# Patient Record
Sex: FEMALE | Race: WHITE | HISPANIC OR LATINO | Employment: PART TIME | URBAN - METROPOLITAN AREA
[De-identification: names, ages, dates, MRNs, and addresses within clinical notes are randomized per-mention and may not be internally consistent; named-entity substitution may affect disease eponyms.]

---

## 2017-10-05 ENCOUNTER — HOSPITAL ENCOUNTER (OUTPATIENT)
Dept: LAB | Facility: MEDICAL CENTER | Age: 27
End: 2017-10-05
Attending: NURSE PRACTITIONER
Payer: COMMERCIAL

## 2017-10-05 ENCOUNTER — INITIAL PRENATAL (OUTPATIENT)
Dept: OBGYN | Facility: CLINIC | Age: 27
End: 2017-10-05

## 2017-10-05 ENCOUNTER — HOSPITAL ENCOUNTER (OUTPATIENT)
Facility: MEDICAL CENTER | Age: 27
End: 2017-10-05
Attending: NURSE PRACTITIONER
Payer: COMMERCIAL

## 2017-10-05 VITALS
WEIGHT: 193 LBS | DIASTOLIC BLOOD PRESSURE: 100 MMHG | BODY MASS INDEX: 37.89 KG/M2 | HEIGHT: 60 IN | SYSTOLIC BLOOD PRESSURE: 160 MMHG

## 2017-10-05 DIAGNOSIS — Z34.80 SUPERVISION OF OTHER NORMAL PREGNANCY, ANTEPARTUM: ICD-10-CM

## 2017-10-05 DIAGNOSIS — O09.90 SUPERVISION OF HIGH RISK PREGNANCY, ANTEPARTUM: ICD-10-CM

## 2017-10-05 PROBLEM — Z34.00 PREGNANCY, SUPERVISION OF FIRST: Status: ACTIVE | Noted: 2017-10-05

## 2017-10-05 LAB
ABO GROUP BLD: NORMAL
ALBUMIN SERPL BCP-MCNC: 3.2 G/DL (ref 3.2–4.9)
ALBUMIN/GLOB SERPL: 0.9 G/DL
ALP SERPL-CCNC: 115 U/L (ref 30–99)
ALT SERPL-CCNC: 28 U/L (ref 2–50)
ANION GAP SERPL CALC-SCNC: 11 MMOL/L (ref 0–11.9)
APPEARANCE UR: ABNORMAL
AST SERPL-CCNC: 41 U/L (ref 12–45)
BACTERIA #/AREA URNS HPF: ABNORMAL /HPF
BASOPHILS # BLD AUTO: 0.6 % (ref 0–1.8)
BASOPHILS # BLD: 0.08 K/UL (ref 0–0.12)
BILIRUB SERPL-MCNC: 0.3 MG/DL (ref 0.1–1.5)
BILIRUB UR QL STRIP.AUTO: NEGATIVE
BLD GP AB SCN SERPL QL: NORMAL
BUN SERPL-MCNC: 13 MG/DL (ref 8–22)
CALCIUM SERPL-MCNC: 9.1 MG/DL (ref 8.5–10.5)
CHLORIDE SERPL-SCNC: 102 MMOL/L (ref 96–112)
CO2 SERPL-SCNC: 22 MMOL/L (ref 20–33)
COLOR UR: YELLOW
CREAT SERPL-MCNC: 0.78 MG/DL (ref 0.5–1.4)
CULTURE IF INDICATED INDCX: YES UA CULTURE
EOSINOPHIL # BLD AUTO: 0.12 K/UL (ref 0–0.51)
EOSINOPHIL NFR BLD: 0.9 % (ref 0–6.9)
EPI CELLS #/AREA URNS HPF: ABNORMAL /HPF
ERYTHROCYTE [DISTWIDTH] IN BLOOD BY AUTOMATED COUNT: 43.7 FL (ref 35.9–50)
GFR SERPL CREATININE-BSD FRML MDRD: >60 ML/MIN/1.73 M 2
GLOBULIN SER CALC-MCNC: 3.4 G/DL (ref 1.9–3.5)
GLUCOSE 1H P 50 G GLC PO SERPL-MCNC: 323 MG/DL (ref 70–139)
GLUCOSE SERPL-MCNC: 219 MG/DL (ref 65–99)
GLUCOSE UR STRIP.AUTO-MCNC: >=1000 MG/DL
HBV SURFACE AG SER QL: NEGATIVE
HCT VFR BLD AUTO: 45.4 % (ref 37–47)
HGB BLD-MCNC: 15.1 G/DL (ref 12–16)
HIV 1+2 AB+HIV1 P24 AG SERPL QL IA: NON REACTIVE
HYALINE CASTS #/AREA URNS LPF: ABNORMAL /LPF
IMM GRANULOCYTES # BLD AUTO: 0.05 K/UL (ref 0–0.11)
IMM GRANULOCYTES NFR BLD AUTO: 0.4 % (ref 0–0.9)
KETONES UR STRIP.AUTO-MCNC: NEGATIVE MG/DL
LEUKOCYTE ESTERASE UR QL STRIP.AUTO: NEGATIVE
LYMPHOCYTES # BLD AUTO: 3.52 K/UL (ref 1–4.8)
LYMPHOCYTES NFR BLD: 25.3 % (ref 22–41)
MCH RBC QN AUTO: 29.3 PG (ref 27–33)
MCHC RBC AUTO-ENTMCNC: 33.3 G/DL (ref 33.6–35)
MCV RBC AUTO: 88 FL (ref 81.4–97.8)
MICRO URNS: ABNORMAL
MONOCYTES # BLD AUTO: 0.9 K/UL (ref 0–0.85)
MONOCYTES NFR BLD AUTO: 6.5 % (ref 0–13.4)
NEUTROPHILS # BLD AUTO: 9.22 K/UL (ref 2–7.15)
NEUTROPHILS NFR BLD: 66.3 % (ref 44–72)
NITRITE UR QL STRIP.AUTO: NEGATIVE
NRBC # BLD AUTO: 0 K/UL
NRBC BLD AUTO-RTO: 0 /100 WBC
PH UR STRIP.AUTO: 6.5 [PH]
PLATELET # BLD AUTO: 318 K/UL (ref 164–446)
PMV BLD AUTO: 11.2 FL (ref 9–12.9)
POTASSIUM SERPL-SCNC: 4.4 MMOL/L (ref 3.6–5.5)
PROT SERPL-MCNC: 6.6 G/DL (ref 6–8.2)
PROT UR QL STRIP: NEGATIVE MG/DL
RBC # BLD AUTO: 5.16 M/UL (ref 4.2–5.4)
RBC # URNS HPF: ABNORMAL /HPF
RBC UR QL AUTO: NEGATIVE
RH BLD: NORMAL
RUBV AB SER QL: 24.3 IU/ML
SODIUM SERPL-SCNC: 135 MMOL/L (ref 135–145)
SP GR UR STRIP.AUTO: 1.04
TREPONEMA PALLIDUM IGG+IGM AB [PRESENCE] IN SERUM OR PLASMA BY IMMUNOASSAY: NON REACTIVE
URATE SERPL-MCNC: 4.8 MG/DL (ref 1.9–8.2)
UROBILINOGEN UR STRIP.AUTO-MCNC: 0.2 MG/DL
WBC # BLD AUTO: 13.9 K/UL (ref 4.8–10.8)
WBC #/AREA URNS HPF: ABNORMAL /HPF

## 2017-10-05 PROCEDURE — 81002 URINALYSIS NONAUTO W/O SCOPE: CPT | Performed by: NURSE PRACTITIONER

## 2017-10-05 PROCEDURE — 90040 PR PRENATAL FOLLOW UP: CPT | Performed by: NURSE PRACTITIONER

## 2017-10-05 RX ORDER — LABETALOL 200 MG/1
200 TABLET, FILM COATED ORAL 2 TIMES DAILY
Qty: 28 TAB | Refills: 0 | Status: SHIPPED | OUTPATIENT
Start: 2017-10-05 | End: 2019-01-29

## 2017-10-05 RX ORDER — METHYLDOPA 250 MG/1
250 TABLET, FILM COATED ORAL
Qty: 28 TAB | Refills: 0 | Status: SHIPPED | OUTPATIENT
Start: 2017-10-05 | End: 2017-10-13

## 2017-10-05 ASSESSMENT — ENCOUNTER SYMPTOMS
CARDIOVASCULAR NEGATIVE: 1
CONSTITUTIONAL NEGATIVE: 1
EYES NEGATIVE: 1
RESPIRATORY NEGATIVE: 1
MUSCULOSKELETAL NEGATIVE: 1
NEUROLOGICAL NEGATIVE: 1
PSYCHIATRIC NEGATIVE: 1
GASTROINTESTINAL NEGATIVE: 1

## 2017-10-05 NOTE — PATIENT INSTRUCTIONS
Plan:   - GC/CT done today, too far along for pap  - GCT ordered  - PIH labs and 24 hr urine ordered   - Prenatal labs ordered - lab slip given  - Discussed PNV, nutrition, adequate water intake, and exercise/weight gain in pregnancy  - NOB informational packet with anticipatory guidance given  - Information on Centering Pregnancy given, pt declines  - S/sx of pregnancy warning signs and PTL precautions given  - Complete OB US next available   - Return to [facility] next week for MD visit   -Consulted with Dr. Noble and prefers to start pt on Aldoment 250 mg BID and Labetalol 200mg BID today

## 2017-10-05 NOTE — PROGRESS NOTES
Subjective:      Angelina Gonzalez is a 27 y.o. female who presents with No chief complaint on file.            Subjective:   Angelina Gonzalez is a 27 y.o.  who presents for her new OB exam.  She is 24w0d with an JATINDER of Estimated Date of Delivery: 18 by LMP. She is feeling well and has no concerns at this time. Denies VB, LOF, contractions or pain. No ER visits or previous care in this pregnancy. Denies dysuria, vaginal DC, fever. Reports fetal movement. Too late for AFP.  Declines CF.  Denies any headache, SOB, changes in vision, swelling in face/abdomen.     No past medical history on file.    No past surgical history on file.     OB History  First pregnancy              Gynecological Hx: Denies any hx of STIs, including HSV. Denies any vulvovaginal disorders and no hx of abnormal cervical cytology. Last pap  normal    Sexual Hx: One current male partner, who is FOB     Contraceptive Hx: Has used pills in the past and has since discontinued use.     Family History  Hypertension: Mother     Denies any genetic disorders in family history.     Social History    Social History  • Marital status:     Spouse name: N/A  • Number of children: N/A  • Years of education: N/A    Occupational History  • Not on file.    Social History Main Topics  • Smoking status: Never Smoker  • Smokeless tobacco: Never Used  • Alcohol use No  • Drug use: No  • Sexual activity: Yes    Partners: Male     Comment: none    Other Topics Concern  • Not on file    Social History Narrative  • No narrative on file      FOB is involved and lives with Angelina Gonzalez.  Pregnancy is unplanned but desired.    She is currently working at [employer] , denies any heavy lifting or exposure to potential teratogens like environmental or occupational toxins.   Denies alcohol use, drug use, or tobacco use in pregnancy.   Denies any current or hx of sexual, emotional or physical abuse or trauma.     Current Medications: PNV  Allergies: Denies allergies to  medications, food, or environmental allergies    Objective:      Vitals:   10/05/17 0903  BP: 140/98  Repeat 160/92  And 160/100  Weight: 87.5 kg (193 lb)  Height: 1.524 m (5')       See Prenatal Physical and Prenatal Vitals  UA 10 protein with hemolyzed blood and moderate ketones along with 1000 glucose, no nitrates       Assessment:      1.  IUP @ 24w0d per unsure LMP       2.  S>D       3.  See problem list as follows      Plan:   - GC/CT done today, too far along for pap  - GCT ordered  - PIH labs and 24 hr urine ordered   - Prenatal labs ordered - lab slip given  - Discussed PNV, nutrition, adequate water intake, and exercise/weight gain in pregnancy  - NOB informational packet with anticipatory guidance given  - Information on Centering Pregnancy given, pt declines  - S/sx of pregnancy warning signs and PTL precautions given  - Complete OB US next available   - Return to [facility] next week for MD visit   -Consulted with Dr. Noble and prefers to start pt on Aldoment 250 mg BID and Labetalol 200mg BID today            Review of Systems   Constitutional: Negative.    HENT: Negative.    Eyes: Negative.    Respiratory: Negative.    Cardiovascular: Negative.    Gastrointestinal: Negative.    Genitourinary: Negative.    Musculoskeletal: Negative.    Skin: Negative.    Neurological: Negative.    Endo/Heme/Allergies: Negative.    Psychiatric/Behavioral: Negative.           Objective:     /92   Ht 1.524 m (5')   Wt 87.5 kg (193 lb)   LMP 04/20/2017 (Approximate)   BMI 37.69 kg/m²      Physical Exam   Constitutional: She is oriented to person, place, and time. She appears well-developed and well-nourished.   HENT:   Head: Normocephalic and atraumatic.   Eyes: Pupils are equal, round, and reactive to light.   Neck: Normal range of motion. Neck supple.   Cardiovascular: Normal rate and regular rhythm.    Pulmonary/Chest: Effort normal and breath sounds normal.   Abdominal: Soft. Bowel sounds are normal.    Genitourinary: Vagina normal and uterus normal.   Musculoskeletal: Normal range of motion.   Neurological: She is alert and oriented to person, place, and time. She has normal reflexes.   Skin: Skin is warm and dry.   Psychiatric: She has a normal mood and affect. Her behavior is normal. Judgment and thought content normal.               Assessment/Plan:     1. Supervision of other normal pregnancy, antepartum  - POCT Urinalysis

## 2017-10-05 NOTE — PROGRESS NOTES
Pt. Here for NOB visit today.  # [phone]  First prenatal care  Pt. States no complaints   Pharmacy verified  1 hr gtt and pnp given today.

## 2017-10-05 NOTE — LETTER
October 5, 2017      Angelina Gonzalez is currently pregnant and being cared for by [facility]. She is not to lift anything more than 20 pounds because this could jeopardize her pregnancy. She also should be able to take breaks for snacks and water every 2-3 hours with frequent bathroom breaks. Thank you for understanding.        Thank you,          ZULY Rogers    Electronically Signed

## 2017-10-05 NOTE — PROGRESS NOTES
Subjective:   Angelina Gonzalez is a 27 y.o.  who presents for her new OB exam.  She is 24w0d with an JATINDER of Estimated Date of Delivery: 18 by LMP. She is feeling well and has no concerns at this time. Denies VB, LOF, contractions or pain. No ER visits or previous care in this pregnancy. Denies dysuria, vaginal DC, fever. Reports fetal movement. Desires AFP.  Declines CF.      No past medical history on file.    No past surgical history on file.     OB History    Para Term  AB Living   1             SAB TAB Ectopic Molar Multiple Live Births                    # Outcome Date GA Lbr Augustus/2nd Weight Sex Delivery Anes PTL Lv   1 Current                    Gynecological Hx: Denies any hx of STIs, including HSV. Denies any vulvovaginal disorders and no hx of abnormal cervical cytology. Last pap  normal    Sexual Hx: One current male partner, who is FOB     Contraceptive Hx: Has used pills in the past and has since discontinued use.     Family History   Problem Relation Age of Onset   • Hypertension Mother      Denies any genetic disorders in family history.     Social History     Social History   • Marital status:      Spouse name: N/A   • Number of children: N/A   • Years of education: N/A     Occupational History   • Not on file.     Social History Main Topics   • Smoking status: Never Smoker   • Smokeless tobacco: Never Used   • Alcohol use No   • Drug use: No   • Sexual activity: Yes     Partners: Male      Comment: none     Other Topics Concern   • Not on file     Social History Narrative   • No narrative on file       FOB is involved and lives with Angelina Gonzalez.  Pregnancy is unplanned but desired.    She is currently working at "[employer] , denies any heavy lifting or exposure to potential teratogens like environmental or occupational toxins.   Denies alcohol use, drug use, or tobacco use in pregnancy.   Denies any current or hx of sexual, emotional or physical abuse or trauma.     Current  Medications: PNV  Allergies: Denies allergies to medications, food, or environmental allergies    Objective:      Vitals:    10/05/17 0903   BP: 140/98   Weight: 87.5 kg (193 lb)   Height: 1.524 m (5')        See Prenatal Physical and Prenatal Vitals  UA WNL today      Assessment:      1.  IUP @ 24w0d per unsure LMP       2.  S=D      3.  See problem list as follows     There are no active problems to display for this patient.        Plan:   -  GC/CT done today   -   - Prenatal labs ordered - lab slip given  - Discussed PNV, nutrition, adequate water intake, and exercise/weight gain in pregnancy  - NOB informational packet with anticipatory guidance given  - Information on Centering Pregnancy given, pt declines  - S/sx of pregnancy warning signs and PTL precautions given  - Complete OB US in next available  wks  - Return to [facility] in 2 wks  -  ***

## 2017-10-06 LAB
C TRACH DNA SPEC QL NAA+PROBE: NEGATIVE
C TRACH DNA SPEC QL NAA+PROBE: NEGATIVE
N GONORRHOEA DNA SPEC QL NAA+PROBE: NEGATIVE
N GONORRHOEA DNA SPEC QL NAA+PROBE: NEGATIVE
SPECIMEN SOURCE: NORMAL
SPECIMEN SOURCE: NORMAL

## 2017-10-08 LAB
BACTERIA UR CULT: NORMAL
SIGNIFICANT IND 70042: NORMAL
SOURCE SOURCE: NORMAL

## 2017-10-09 ENCOUNTER — HOSPITAL ENCOUNTER (OUTPATIENT)
Facility: MEDICAL CENTER | Age: 27
End: 2017-10-09
Attending: NURSE PRACTITIONER
Payer: COMMERCIAL

## 2017-10-09 ENCOUNTER — TELEPHONE (OUTPATIENT)
Dept: OBGYN | Facility: CLINIC | Age: 27
End: 2017-10-09

## 2017-10-09 NOTE — TELEPHONE ENCOUNTER
----- Message from Paula Allison C.N.M. sent at 10/6/2017  7:55 AM PDT -----  Pt has GDM > 200 on glucose in her CMP.  Her 1hr isn't here but it is 323, so she needs GDM teaching and to f/u at GDM clinic.  Please schedule this asap.      10/9/17 1436 Left message for pt to call back regarding lab results.   10/10/17 1053 pt called back and Pt notified of Dx of GDM and needs to go to GDM class and f/u at [facility] with MD. Pt asking if she can be seen on Thursday after her US on 10/12/17. Consulted with Shruthi EISENBERG who agreed to see pt on Thursday 10/12/17. Pt scheduled for GDM f/u at [facility] on 10/19/17 at 0900.  Pt aware to bring in her log book and meter and will need to provide UA sample. Pt Verbalized understanding.

## 2017-10-10 DIAGNOSIS — O09.90 SUPERVISION OF HIGH RISK PREGNANCY, ANTEPARTUM: ICD-10-CM

## 2017-10-10 DIAGNOSIS — Z34.80 SUPERVISION OF OTHER NORMAL PREGNANCY, ANTEPARTUM: ICD-10-CM

## 2017-10-10 LAB
PROT 24H UR-MCNC: 330 MG/24 HR (ref 30–150)
PROT 24H UR-MRATE: 12 MG/DL (ref 0–15)
SPECIMEN VOL UR: 2750 ML

## 2017-10-12 ENCOUNTER — NON-PROVIDER VISIT (OUTPATIENT)
Dept: OBGYN | Facility: CLINIC | Age: 27
End: 2017-10-12

## 2017-10-12 ENCOUNTER — ROUTINE PRENATAL (OUTPATIENT)
Dept: OBGYN | Facility: CLINIC | Age: 27
End: 2017-10-12

## 2017-10-12 ENCOUNTER — APPOINTMENT (OUTPATIENT)
Dept: RADIOLOGY | Facility: IMAGING CENTER | Age: 27
End: 2017-10-12
Attending: NURSE PRACTITIONER

## 2017-10-12 ENCOUNTER — HOSPITAL ENCOUNTER (OUTPATIENT)
Facility: MEDICAL CENTER | Age: 27
End: 2017-10-12
Attending: OBSTETRICS & GYNECOLOGY | Admitting: OBSTETRICS & GYNECOLOGY

## 2017-10-12 VITALS — DIASTOLIC BLOOD PRESSURE: 114 MMHG | BODY MASS INDEX: 37.3 KG/M2 | WEIGHT: 191 LBS | SYSTOLIC BLOOD PRESSURE: 190 MMHG

## 2017-10-12 VITALS — HEIGHT: 61 IN | BODY MASS INDEX: 36.17 KG/M2 | WEIGHT: 191.6 LBS

## 2017-10-12 VITALS
BODY MASS INDEX: 36.06 KG/M2 | HEIGHT: 61 IN | WEIGHT: 191 LBS | DIASTOLIC BLOOD PRESSURE: 88 MMHG | SYSTOLIC BLOOD PRESSURE: 145 MMHG | HEART RATE: 89 BPM

## 2017-10-12 DIAGNOSIS — O09.90 SUPERVISION OF HIGH RISK PREGNANCY, ANTEPARTUM: ICD-10-CM

## 2017-10-12 DIAGNOSIS — Z34.80 SUPERVISION OF OTHER NORMAL PREGNANCY, ANTEPARTUM: ICD-10-CM

## 2017-10-12 LAB
ALBUMIN SERPL BCP-MCNC: 3 G/DL (ref 3.2–4.9)
ALBUMIN/GLOB SERPL: 0.9 G/DL
ALP SERPL-CCNC: 107 U/L (ref 30–99)
ALT SERPL-CCNC: 28 U/L (ref 2–50)
ANION GAP SERPL CALC-SCNC: 10 MMOL/L (ref 0–11.9)
APPEARANCE UR: CLEAR
AST SERPL-CCNC: 56 U/L (ref 12–45)
BASOPHILS # BLD AUTO: 0.6 % (ref 0–1.8)
BASOPHILS # BLD: 0.07 K/UL (ref 0–0.12)
BILIRUB SERPL-MCNC: 0.4 MG/DL (ref 0.1–1.5)
BUN SERPL-MCNC: 15 MG/DL (ref 8–22)
CALCIUM SERPL-MCNC: 9 MG/DL (ref 8.5–10.5)
CHLORIDE SERPL-SCNC: 105 MMOL/L (ref 96–112)
CO2 SERPL-SCNC: 18 MMOL/L (ref 20–33)
COLOR UR AUTO: YELLOW
CREAT SERPL-MCNC: 0.79 MG/DL (ref 0.5–1.4)
EOSINOPHIL # BLD AUTO: 0.1 K/UL (ref 0–0.51)
EOSINOPHIL NFR BLD: 0.8 % (ref 0–6.9)
ERYTHROCYTE [DISTWIDTH] IN BLOOD BY AUTOMATED COUNT: 44.3 FL (ref 35.9–50)
EST. AVERAGE GLUCOSE BLD GHB EST-MCNC: 235 MG/DL
GFR SERPL CREATININE-BSD FRML MDRD: >60 ML/MIN/1.73 M 2
GLOBULIN SER CALC-MCNC: 3.3 G/DL (ref 1.9–3.5)
GLUCOSE BLD-MCNC: 111 MG/DL (ref 65–99)
GLUCOSE BLD-MCNC: 111 MG/DL (ref 65–99)
GLUCOSE SERPL-MCNC: 139 MG/DL (ref 65–99)
GLUCOSE UR QL STRIP.AUTO: NEGATIVE MG/DL
HBA1C MFR BLD: 9.8 % (ref 0–5.6)
HCT VFR BLD AUTO: 43 % (ref 37–47)
HGB BLD-MCNC: 14.3 G/DL (ref 12–16)
IMM GRANULOCYTES # BLD AUTO: 0.05 K/UL (ref 0–0.11)
IMM GRANULOCYTES NFR BLD AUTO: 0.4 % (ref 0–0.9)
KETONES UR QL STRIP.AUTO: NEGATIVE MG/DL
LEUKOCYTE ESTERASE UR QL STRIP.AUTO: ABNORMAL
LYMPHOCYTES # BLD AUTO: 3.06 K/UL (ref 1–4.8)
LYMPHOCYTES NFR BLD: 24.6 % (ref 22–41)
MCH RBC QN AUTO: 28.9 PG (ref 27–33)
MCHC RBC AUTO-ENTMCNC: 33.3 G/DL (ref 33.6–35)
MCV RBC AUTO: 87 FL (ref 81.4–97.8)
MONOCYTES # BLD AUTO: 0.81 K/UL (ref 0–0.85)
MONOCYTES NFR BLD AUTO: 6.5 % (ref 0–13.4)
NEUTROPHILS # BLD AUTO: 8.36 K/UL (ref 2–7.15)
NEUTROPHILS NFR BLD: 67.1 % (ref 44–72)
NITRITE UR QL STRIP.AUTO: NEGATIVE
NRBC # BLD AUTO: 0 K/UL
NRBC BLD AUTO-RTO: 0 /100 WBC
PH UR STRIP.AUTO: 6 [PH]
PLATELET # BLD AUTO: 300 K/UL (ref 164–446)
PMV BLD AUTO: 10.6 FL (ref 9–12.9)
POTASSIUM SERPL-SCNC: 4.4 MMOL/L (ref 3.6–5.5)
PROT SERPL-MCNC: 6.3 G/DL (ref 6–8.2)
PROT UR QL STRIP: NEGATIVE MG/DL
RBC # BLD AUTO: 4.94 M/UL (ref 4.2–5.4)
RBC UR QL AUTO: NEGATIVE
SODIUM SERPL-SCNC: 133 MMOL/L (ref 135–145)
SP GR UR: <=1.005
URATE SERPL-MCNC: 6 MG/DL (ref 1.9–8.2)
WBC # BLD AUTO: 12.5 K/UL (ref 4.8–10.8)

## 2017-10-12 PROCEDURE — 84550 ASSAY OF BLOOD/URIC ACID: CPT

## 2017-10-12 PROCEDURE — 700111 HCHG RX REV CODE 636 W/ 250 OVERRIDE (IP): Performed by: NURSE PRACTITIONER

## 2017-10-12 PROCEDURE — 83036 HEMOGLOBIN GLYCOSYLATED A1C: CPT

## 2017-10-12 PROCEDURE — 36415 COLL VENOUS BLD VENIPUNCTURE: CPT

## 2017-10-12 PROCEDURE — 700102 HCHG RX REV CODE 250 W/ 637 OVERRIDE(OP): Performed by: OBSTETRICS & GYNECOLOGY

## 2017-10-12 PROCEDURE — 96374 THER/PROPH/DIAG INJ IV PUSH: CPT

## 2017-10-12 PROCEDURE — 82962 GLUCOSE BLOOD TEST: CPT | Mod: 91

## 2017-10-12 PROCEDURE — 76805 OB US >/= 14 WKS SNGL FETUS: CPT | Mod: 26 | Performed by: OBSTETRICS & GYNECOLOGY

## 2017-10-12 PROCEDURE — 90040 PR PRENATAL FOLLOW UP: CPT | Performed by: OBSTETRICS & GYNECOLOGY

## 2017-10-12 PROCEDURE — 81002 URINALYSIS NONAUTO W/O SCOPE: CPT

## 2017-10-12 PROCEDURE — 59025 FETAL NON-STRESS TEST: CPT | Performed by: OBSTETRICS & GYNECOLOGY

## 2017-10-12 PROCEDURE — 80053 COMPREHEN METABOLIC PANEL: CPT

## 2017-10-12 PROCEDURE — 85025 COMPLETE CBC W/AUTO DIFF WBC: CPT

## 2017-10-12 PROCEDURE — A9270 NON-COVERED ITEM OR SERVICE: HCPCS | Performed by: OBSTETRICS & GYNECOLOGY

## 2017-10-12 RX ORDER — LABETALOL 300 MG/1
300 TABLET, FILM COATED ORAL ONCE
Status: COMPLETED | OUTPATIENT
Start: 2017-10-12 | End: 2017-10-12

## 2017-10-12 RX ORDER — HYDRALAZINE HYDROCHLORIDE 20 MG/ML
5 INJECTION INTRAMUSCULAR; INTRAVENOUS ONCE
Status: COMPLETED | OUTPATIENT
Start: 2017-10-12 | End: 2017-10-12

## 2017-10-12 RX ORDER — NIFEDIPINE 30 MG/1
30 TABLET, EXTENDED RELEASE ORAL ONCE
Status: COMPLETED | OUTPATIENT
Start: 2017-10-12 | End: 2017-10-12

## 2017-10-12 RX ADMIN — LABETALOL HCL 300 MG: 300 TABLET, FILM COATED ORAL at 14:02

## 2017-10-12 RX ADMIN — HYDRALAZINE HYDROCHLORIDE 5 MG: 20 INJECTION INTRAMUSCULAR; INTRAVENOUS at 13:17

## 2017-10-12 RX ADMIN — NIFEDIPINE 30 MG: 30 TABLET, FILM COATED, EXTENDED RELEASE ORAL at 11:34

## 2017-10-12 ASSESSMENT — PAIN SCALES - GENERAL: PAINLEVEL_OUTOF10: 0

## 2017-10-12 NOTE — PROGRESS NOTES
Blakemichael Brooke is a 27 y.o.  at 25w0d here today for obstetrical visit.  Patient is without complaints.    She reports good fetal movement.  She denies vaginal bleeding.  She denies rupture of membranes.  She denies contractions.     has Supervision of high-risk pregnancy on her problem list.    Patient was seen here today solely for blood pressure check  Blood pressure is 190/114  Patient denies headaches blurry vision nausea vomiting  Otherwise doing well    A/P IUP at 25w0d  Patient is sent to labor and delivery for blood pressure evaluation  Patient is also sent for labs    F/U in 1 weeks

## 2017-10-12 NOTE — PROGRESS NOTES
Labor and Delivery Triage check    PATIENT ID:  NAME:  Angelina Cox  MRN:               [id number]  YOB: 1990     27 y.o. female  at 29w0d by US done today, pt was unsure of LMP due to irregular periods. She was sent from clinic due to severe pressures today. She reports not having taken her blood pressure medication this morning but she has been taking it the past week regularly Aldomet 250mg BID and Labetalol 200mg BID.     Subjective:  Pt denies any headache, SOB, changes in vision, swelling in face/abdomen, right upper quadrant pain or any other issues at this time. Reports good fetal movement, no vaginal bleeding, no leaking fluid, no contractions/cramping pain.     Objective:    Vitals:    10/12/17 1200 10/12/17 1214 10/12/17 1229 10/12/17 1244   BP: (!) 171/103 (!) 166/101 (!) 172/107 (!) 173/105   Pulse: 80 82 85 77   Weight:       Height:         Serial BPs 117/112; 173/105; post medication 135/76, 124/75    Cervix:  not assessed as pt not in labor  [place]: Mild irritability noted, not felt by patient, fundus soft on palpation  FHRM: Baseline 145, moderate variability no decels  medications: Procardia 30XL now followed by 5mg hydralazine IV due to continued elevated pressures   Pain: none    Assessment: 27 y.o. female    at 29w0d by US today.    Plan:   1. PIH labs: AST 56; all other values within normal range   2. Continued maternal and fetal monitoring  3. Pt to be discharged home once stable with BPs   4.   Pt to follow up at [facility] tomorrow for medication consult for chronic hyperglycemia   5.   Discharged home on Procardia 30XL daily and Labetalol 300mg BID, to discontinue aldomet at this time.   6.   Warning s/sx of preeclampsia, PTL discussed

## 2017-10-12 NOTE — PROGRESS NOTES
Pt here today for OB follow up  Pt states no complaints   Reports +  Good # [phone]  Pharmacy Confirmed.

## 2017-10-12 NOTE — PROGRESS NOTES
, EDC  = 25.0 weeks,  Hx new DX chronic HTN last week taking BP's meds at home, new DX diet control GDM, late PNC.     1100- Patient presents to L&D bed 3 for PIH work up and Blood pressure monitoring. Denies any contractions, leaking of fluid or any vaginal bleeding. EFM and TOCO applied. States positive fetal movement. VSS. Serial BP's started. Patient denies any Headaches, visions changes or epigastric pain. Patient states she just found out she was pregnant 3-4 weeks ago. States she was just diagnosed with GDM this week and chronic HTN last week. Patient states she is taking blood pressure medicine, but forgot to take it this AM.     1125- [place] AP[facility] at bedside. Updated on patient blood pressures and status. In to see patient. Will monitor after procardia Procardia XL given. Updated on patient uterine activity. Patient denies feeling any contractions.     1240- patient's blood pressures remain 160-170/105's. [place] [facility] updated. Will consult with Dr Gabriel    1300- order received from Dr Gabriel to give 5 mg IV hydralazine. IV started.     1315- 5 mg Hydralizine given by IV. Will continue to monitor. Fingerstick 111.    1340- Dr Gabriel called RN for an update. Update given on blood pressures, fingerstick and uterine activity. Order received to give patient labetalol 300 mg now. Will continue to monitor blood pressures     1415- [place] [facility] updated on patient while in department. Will continue to monitor.     1455- [place] [facility] into see patient discussed plan of care. Order received to discharge home and to have f/u with [facility] tomorrow.     1510- patient had 3 variables. [facility] [place] APN notified. Strip reviewed. Will continue to monitor.     1530- [place] [facility] at bedside. Patient having late decelerations. Patient given IV fluid. Positioned changed. Dr Gabriel called and notified. Informed to continue with fluid and to have patient eat.     1640- [facility] [place] [facility] at bedside. Strip reviewed. Call placed to Dr Gabriel.   Gabriel reviewed tracing in call room. Order received to discharge home.     1650- Dr Gabriel called regarding fetal tracing. Dr Gabriel will be by to see patient.     1700- Dr Gabriel at bedside. Assisted with fetal monitoring. Discussed with patient the importance of getting GDM and blood pressures under control. Strip reviewed. EFM and TOCO removed per Dr Gabriel order. Order received to continue with discharge.     1720-  Patient given  labor, preeclampsia and to continue with kick counts. Patient states understanding of when to return to L&D. Next appt is tomorrow at 0900 at [facility]. patient instructed on importance of keeping appt tomorrow. Patient given prescriptions for Procardia XL and Labetalol 300 mg.  Patient discharged home with FOB in stable condition.

## 2017-10-12 NOTE — LETTER
October 12, 2017                   Re: Angelina Cox    1990         [id number]       [facility]  [address] ([address])  [place] [address]      Dear :[facility], *    On 10/12/2017, your patient Angelina Cox, received 1.5 hours of gestational diabetes training from the [facility] Center at [facility] for management of her gestational diabetes.  She likely has undiagnosed type 2 diabetes as her 1 hour OGTT result was 323 and she had 1000 glucose in her urine.  Her EDC is Estimated Date of Delivery: 1/25/18.  We taught the following subjects:    Introduction to gestational diabetes, benefits and responsibilities of patient, physiology of diabetes and the diease process, benefits of blood glucose monitoring and record keeping, medication action and possible side effects, hypoglycemia, sick day management, exercise, stress reduction and travel with diabetes.       Nurse assessment / Education:    Comments:    BP:    Edema:no      Weight:Weight: 86.9 kg (191 lb 9.6 oz)         Complaints:no      Pathophysiology of diabetes in pregnancy    Discuss  potential maternal and fetal complications in pregnancy with diabetes.     Importance of blood glucose monitoring   Proper testing technique using a One Touch Verio IQ meter.    At 9:30, the meter read 192, which was 2.5 after eating.  Testing: fasting and one hour after meals,  expected ranges and rationale for strict control.   Urine ketone testing and rationale    Ketone testing:  First morning void and one other   time of day, alternating times before meals.    Ketone test today:yes - See flowsheet       Recognition and treatment of hypoglycemia.     Insulin taught: No  Insulin briefly dicussed at this time.    Should patient require insulin later in pregnancy, she would need further education.   Insulin taught: Yes    Discuss benefits and risks of exercise in pregnancy  Discuss when to call Doctor    Patient provided [id number]  calorie meal plan with 3 meals and 3 snacks.   172 grams carbohydrate,   99 grams protein,   65 grams fat  Importance of meal planning in diabetes management during pregnancy  Importance of consistent timing of meals and snacks and agreed upon times  Avoidance of simple carbohydrates  Metabolism of food components relating to pregnancy  Identification of foods in food groups  Patient demonstrates adequate ability to utilize meal planning manual for reference  Plan 3 meals and 3 snacks with 90% accuracy  Review basic principles of eating out  Reviewed precautions with artificial sweeteners  Comments:  Angelina agreed to follow the meal plan and to eat at the times agreed upon.  She will check blood glucose 4 times a day and record the values in her log book.  She will follow up at [facility].    We also discussed keeping the salt lower in her meal plan because she has elevated blood pressure and is on medication for it.    Patient/caregiver appeared to understand the content as demonstrated by appropriate questions.     Angelina Brooke was encouraged to discuss this further with you.    Hopefully this will help in your management of her care.  If we can be of further assistance, please feel free to call.    Thank you for the referral.    Sincerely,  Shruthi Baez RD, CDE  Certified Diabetes Educator

## 2017-10-13 ENCOUNTER — ROUTINE PRENATAL (OUTPATIENT)
Dept: OBGYN | Facility: CLINIC | Age: 27
End: 2017-10-13

## 2017-10-13 VITALS — DIASTOLIC BLOOD PRESSURE: 100 MMHG | BODY MASS INDEX: 36.66 KG/M2 | WEIGHT: 194 LBS | SYSTOLIC BLOOD PRESSURE: 130 MMHG

## 2017-10-13 DIAGNOSIS — O24.414 INSULIN CONTROLLED GESTATIONAL DIABETES MELLITUS (GDM) IN THIRD TRIMESTER: ICD-10-CM

## 2017-10-13 DIAGNOSIS — O09.90 SUPERVISION OF HIGH RISK PREGNANCY, ANTEPARTUM: ICD-10-CM

## 2017-10-13 DIAGNOSIS — I10 CHRONIC HYPERTENSION: ICD-10-CM

## 2017-10-13 LAB
APPEARANCE UR: NORMAL
BILIRUB UR STRIP-MCNC: NORMAL MG/DL
COLOR UR AUTO: NORMAL
GLUCOSE UR STRIP.AUTO-MCNC: NORMAL MG/DL
KETONES UR STRIP.AUTO-MCNC: NORMAL MG/DL
LEUKOCYTE ESTERASE UR QL STRIP.AUTO: NORMAL
NITRITE UR QL STRIP.AUTO: NORMAL
PH UR STRIP.AUTO: 6 [PH] (ref 5–8)
PROT UR QL STRIP: NORMAL MG/DL
RBC UR QL AUTO: NORMAL
SP GR UR STRIP.AUTO: 1.02
UROBILINOGEN UR STRIP-MCNC: NORMAL MG/DL

## 2017-10-13 PROCEDURE — 81002 URINALYSIS NONAUTO W/O SCOPE: CPT | Performed by: OBSTETRICS & GYNECOLOGY

## 2017-10-13 PROCEDURE — 90040 PR PRENATAL FOLLOW UP: CPT | Performed by: OBSTETRICS & GYNECOLOGY

## 2017-10-13 NOTE — PROGRESS NOTES
Self injection and mixing Insulin instructions given to pt on 10/13/17. Pt will start on 10 units of NPH, with 5 units of regular insulin before breakfast, 5 units of regular insulin before dinner and 10 units of NPH QHS. Pt instructed on how to draw up insulin and how to mix insulin, site selection and rotation, self injection technique, proper storage of disposal of syringes. Pt demonstrated back self injection technique successfully. Advised to follow her meal plan very closely. Instruction booklets given. Will call back in case of any questions.    One vial of NPH given Lot# [id number] Exp: 12/2019 and one vial of Regular insulin given Lot# [id number], Exp: 1/2019.

## 2017-10-13 NOTE — LETTER
"Count Your Baby's Movements  Another step to a healthy delivery    A Epic Dress Re Test             Dept: [phone]    How Many Weeks Pregnant? 29w1d    Date to Begin Counting: 10/13/17              How to use this chart    One way for your physician to keep track of your baby's health is by knowing how often the baby moves (or \"kicks\") in your womb.  You can help your physician to do this by using this chart every day.    Every day, you should see how many hours it takes for your baby to move 10 times.  Start in the morning, as soon as you get up.    · First, write down the time your baby moves until you get to 10.  · Check off one box every time your baby moves until you get to 10.  · Write down the time you finished counting in the last column.  · Total how long it took to count up all 10 movements.  · Finally, fill in the box that shows how long this took.  After counting 10 movements, you no longer have to count any more that day.  The next morning, just start counting again as soon as you get up.    What should you call a \"movement\"?  It is hard to say, because it will feel different from one mother to another and from one pregnancy to the next.  The important thing is that you count the movements the same way throughout your pregnancy.  If you have more questions, you should ask your physician.    Count carefully every day!  SAMPLE:  Week 28    How many hours did it take to feel 10 movements?       Start  Time     1     2     3     4     5     6     7     8     9     10   Finish Time   Mon 8:20 ·  ·  ·  ·  ·  ·  ·  ·  ·  ·  11:40   Tue Wed Thu Fri               Sat               Sun                 IMPORTANT: You should contact your physician if it takes more than two hours for you to feel 10 movements.  Each morning, write down the time and start to count the movements of your baby.  Keep track by checking off one box every time you feel one movement.  When you " "have felt 10 \"kicks\", write down the time you finished counting in the last column.  Then fill in the   box (over the check shana) for the number of hours it took.  Be sure to read the complete instructions on the previous page.            "

## 2017-10-13 NOTE — PROGRESS NOTES
Review of labs and US as well as NST     Pt has EDC changed to 12/28/18 with EGA of 29 0/7 weeks  She also has REGINALD of 19 cm making fetal monitoring very difficult  Initially thought patient to be 25 weeks when sent over from [facility] for BP monitoring and control    Pt BP has been controlled on Procardia PO, hydralazine x 1 SIVP and labetalol PO. She has been monitored for several hours and BP have remained stable.    Pt has not eaten since being here and last ate breakfast around 8 am therefore fetal tracing has been decreased. Currently fetal monitoring improved with good BTBV and accels by 10 beats x 2 in 20 min period. Can hear good fetal movement but baby goes off monitor with movement secondary to increased REGINALD.    Discussed with patient need to control her DM and HTN both which are probably chronic and the importance to both her and the well being of the fetus.    PTL precautions and preeclampsia precautions given.    Pt has appt tomorrow at 0900 for start of insulin.    HbA1C is 9.8

## 2017-10-13 NOTE — PROGRESS NOTES
Angelina Brooke 27 y.o.  29w1d here today for obstetrical visit. Patient reports good  fetal movement. denies contractions, denies vaginal bleeding, denies loss of fluid.    Pregnancy is complicated by   Patient Active Problem List    Diagnosis Date Noted   • Supervision of high-risk pregnancy 10/05/2017   Pt sent to L&D yesterday for elevated BPS.  She is unsure what she is taking, but it thinks it is Labetalol 300 mg po bid and Procardia 30 mg.  No headache, visual changes, RUQ pain, or edema.       Fasting blood sugars range - unknown  Postprandial blood sugars range 168-192  Hemoglobin A1c 9.8  Will start insulin   Insulin regimen  NPH a.m. 10 , regular a.m.5  Regular with dinner 5  NPH at bedtime 10    Referral placed to [facility] for fetal survey and echo.     Followup in 1 week   labor precautions are reviewed  Continue kick counts daily after 28 weeks  NST twice weekly after 32 weeks if on insulin

## 2017-10-13 NOTE — PROGRESS NOTES
Pt here for OB/FU GDM Reports Good Fetal Movement.  Pt states no complications today.   Pt will get flu and TDAP vaccine at next visit  Kick count sheet was given and explained to pt.  Pt declines BTL   # [phone]

## 2017-10-13 NOTE — PROGRESS NOTES
Referral faxed to [facility] on 10/13/17.  They will contact patient to schedule appt.  Please check with patient if appt was made/ document. Thank you

## 2017-10-13 NOTE — LETTER
October 13, 2017    Patient: Angelina Cox   YOB: 1990   Date of Visit: 10/13/2017       To Whom It May Concern:    Angelina Cox was seen and treated at [facility] please excuse her from work this morning. If you have any question please call us.     Sincerely,             Dr. Jeanne Bhatti  [facility]  Dept: [phone]

## 2017-10-16 ENCOUNTER — TELEPHONE (OUTPATIENT)
Dept: OBGYN | Facility: CLINIC | Age: 27
End: 2017-10-16

## 2017-10-16 DIAGNOSIS — O09.90 SUPERVISION OF HIGH RISK PREGNANCY, ANTEPARTUM: Primary | ICD-10-CM

## 2017-10-16 DIAGNOSIS — O24.913 DIABETES MELLITUS AFFECTING PREGNANCY IN THIRD TRIMESTER: ICD-10-CM

## 2017-10-16 NOTE — TELEPHONE ENCOUNTER
Pt called triage line stating she needs an order for insulin needles called pt to clarify what she needed unable to contact pt left message to call back.

## 2017-10-19 ENCOUNTER — ROUTINE PRENATAL (OUTPATIENT)
Dept: OBGYN | Facility: CLINIC | Age: 27
End: 2017-10-19

## 2017-10-19 VITALS — SYSTOLIC BLOOD PRESSURE: 140 MMHG | WEIGHT: 191 LBS | BODY MASS INDEX: 36.09 KG/M2 | DIASTOLIC BLOOD PRESSURE: 86 MMHG

## 2017-10-19 DIAGNOSIS — O09.30 LATE PRENATAL CARE: ICD-10-CM

## 2017-10-19 DIAGNOSIS — I10 ESSENTIAL HYPERTENSION: ICD-10-CM

## 2017-10-19 DIAGNOSIS — O09.90 SUPERVISION OF HIGH RISK PREGNANCY, ANTEPARTUM: ICD-10-CM

## 2017-10-19 PROCEDURE — 90040 PR PRENATAL FOLLOW UP: CPT | Performed by: OBSTETRICS & GYNECOLOGY

## 2017-10-19 NOTE — PROGRESS NOTES
Pt denies CTX, LOF, VB or any other c/o.  Good fetal movement.    Lab:  Recent Results (from the past 672 hour(s))   CHLAMYDIA/GC PCR URINE OR SWAB    Collection Time: 10/05/17 10:15 AM   Result Value Ref Range    Source GENITAL     C. trachomatis by PCR Negative Negative    N. gonorrhoeae by PCR Negative Negative   PREG CNTR PRENATAL PN    Collection Time: 10/05/17  1:05 PM   Result Value Ref Range    Color Yellow     Character Cloudy (A)     Specific Gravity 1.038 <1.035    Ph 6.5 5.0 - 8.0    Glucose >=1000 (A) Negative mg/dL    Ketones Negative Negative mg/dL    Protein Negative Negative mg/dL    Bilirubin Negative Negative    Urobilinogen, Urine 0.2 Negative    Nitrite Negative Negative    Leukocyte Esterase Negative Negative    Occult Blood Negative Negative    Micro Urine Req Microscopic     Rubella IgG Antibody 24.30 IU/mL    Syphilis, Treponemal Qual Non Reactive Non Reactive    Hepatitis B Surface Antigen Negative Negative    WBC 13.9 (H) 4.8 - 10.8 K/uL    RBC 5.16 4.20 - 5.40 M/uL    Hemoglobin 15.1 12.0 - 16.0 g/dL    Hematocrit 45.4 37.0 - 47.0 %    MCV 88.0 81.4 - 97.8 fL    MCH 29.3 27.0 - 33.0 pg    MCHC 33.3 (L) 33.6 - 35.0 g/dL    RDW 43.7 35.9 - 50.0 fL    Platelet Count 318 164 - 446 K/uL    MPV 11.2 9.0 - 12.9 fL    Neutrophils-Polys 66.30 44.00 - 72.00 %    Lymphocytes 25.30 22.00 - 41.00 %    Monocytes 6.50 0.00 - 13.40 %    Eosinophils 0.90 0.00 - 6.90 %    Basophils 0.60 0.00 - 1.80 %    Immature Granulocytes 0.40 0.00 - 0.90 %    Nucleated RBC 0.00 /100 WBC    Neutrophils (Absolute) 9.22 (H) 2.00 - 7.15 K/uL    Lymphs (Absolute) 3.52 1.00 - 4.80 K/uL    Monos (Absolute) 0.90 (H) 0.00 - 0.85 K/uL    Eos (Absolute) 0.12 0.00 - 0.51 K/uL    Baso (Absolute) 0.08 0.00 - 0.12 K/uL    Immature Granulocytes (abs) 0.05 0.00 - 0.11 K/uL    NRBC (Absolute) 0.00 K/uL    Culture Indicated Yes UA Culture   COMP METABOLIC PANEL    Collection Time: 10/05/17  1:05 PM   Result Value Ref Range    Sodium 135  135 - 145 mmol/L    Potassium 4.4 3.6 - 5.5 mmol/L    Chloride 102 96 - 112 mmol/L    Co2 22 20 - 33 mmol/L    Anion Gap 11.0 0.0 - 11.9    Glucose 219 (H) 65 - 99 mg/dL    Bun 13 8 - 22 mg/dL    Creatinine 0.78 0.50 - 1.40 mg/dL    Calcium 9.1 8.5 - 10.5 mg/dL    AST(SGOT) 41 12 - 45 U/L    ALT(SGPT) 28 2 - 50 U/L    Alkaline Phosphatase 115 (H) 30 - 99 U/L    Total Bilirubin 0.3 0.1 - 1.5 mg/dL    Albumin 3.2 3.2 - 4.9 g/dL    Total Protein 6.6 6.0 - 8.2 g/dL    Globulin 3.4 1.9 - 3.5 g/dL    A-G Ratio 0.9 g/dL   URIC ACID    Collection Time: 10/05/17  1:05 PM   Result Value Ref Range    Uric Acid 4.8 1.9 - 8.2 mg/dL   HIV ANTIBODIES    Collection Time: 10/05/17  1:05 PM   Result Value Ref Range    HIV Ag/Ab Combo Assay Non Reactive Non Reactive   CHLAMYDIA/GC PCR URINE OR SWAB    Collection Time: 10/05/17  1:05 PM   Result Value Ref Range    Source Urine     C. trachomatis by PCR Negative Negative    N. gonorrhoeae by PCR Negative Negative   OP PRENATAL PANEL-BLOOD BANK    Collection Time: 10/05/17  1:05 PM   Result Value Ref Range    ABO Grouping Only O     Rh Grouping Only POS     Antibody Screen Scrn NEG    URINE MICROSCOPIC (W/UA)    Collection Time: 10/05/17  1:05 PM   Result Value Ref Range    WBC 5-10 (A) /hpf    RBC 2-5 (A) /hpf    Bacteria Moderate (A) None /hpf    Epithelial Cells Moderate (A) /hpf    Hyaline Cast 0-2 /lpf   ESTIMATED GFR    Collection Time: 10/05/17  1:05 PM   Result Value Ref Range    GFR If African American >60 >60 mL/min/1.73 m 2    GFR If Non African American >60 >60 mL/min/1.73 m 2   URINE CULTURE(NEW)    Collection Time: 10/05/17  1:05 PM   Result Value Ref Range    Significant Indicator NEG     Source UR     Urine Culture Mixed skin ap >100,000 cfu/mL    GLUCOSE 1HR GESTATIONAL    Collection Time: 10/05/17  2:10 PM   Result Value Ref Range    Glucose, Post Dose 323 (H) 70 - 139 mg/dL   URINETOTAL PROTEIN 24 HR    Collection Time: 10/09/17  7:00 AM   Result Value Ref Range     Total Protein, Urine 12.0 0.0 - 15.0 mg/dL    Total Volume, Urine 2,750 mL    Total Protein, 24 Hour Urine 330.0 (H) 30.0 - 150.0 mg/24 Hr   CBC WITH DIFFERENTIAL    Collection Time: 10/12/17 11:30 AM   Result Value Ref Range    WBC 12.5 (H) 4.8 - 10.8 K/uL    RBC 4.94 4.20 - 5.40 M/uL    Hemoglobin 14.3 12.0 - 16.0 g/dL    Hematocrit 43.0 37.0 - 47.0 %    MCV 87.0 81.4 - 97.8 fL    MCH 28.9 27.0 - 33.0 pg    MCHC 33.3 (L) 33.6 - 35.0 g/dL    RDW 44.3 35.9 - 50.0 fL    Platelet Count 300 164 - 446 K/uL    MPV 10.6 9.0 - 12.9 fL    Neutrophils-Polys 67.10 44.00 - 72.00 %    Lymphocytes 24.60 22.00 - 41.00 %    Monocytes 6.50 0.00 - 13.40 %    Eosinophils 0.80 0.00 - 6.90 %    Basophils 0.60 0.00 - 1.80 %    Immature Granulocytes 0.40 0.00 - 0.90 %    Nucleated RBC 0.00 /100 WBC    Neutrophils (Absolute) 8.36 (H) 2.00 - 7.15 K/uL    Lymphs (Absolute) 3.06 1.00 - 4.80 K/uL    Monos (Absolute) 0.81 0.00 - 0.85 K/uL    Eos (Absolute) 0.10 0.00 - 0.51 K/uL    Baso (Absolute) 0.07 0.00 - 0.12 K/uL    Immature Granulocytes (abs) 0.05 0.00 - 0.11 K/uL    NRBC (Absolute) 0.00 K/uL   COMP METABOLIC PANEL    Collection Time: 10/12/17 11:30 AM   Result Value Ref Range    Sodium 133 (L) 135 - 145 mmol/L    Potassium 4.4 3.6 - 5.5 mmol/L    Chloride 105 96 - 112 mmol/L    Co2 18 (L) 20 - 33 mmol/L    Anion Gap 10.0 0.0 - 11.9    Glucose 139 (H) 65 - 99 mg/dL    Bun 15 8 - 22 mg/dL    Creatinine 0.79 0.50 - 1.40 mg/dL    Calcium 9.0 8.5 - 10.5 mg/dL    AST(SGOT) 56 (H) 12 - 45 U/L    ALT(SGPT) 28 2 - 50 U/L    Alkaline Phosphatase 107 (H) 30 - 99 U/L    Total Bilirubin 0.4 0.1 - 1.5 mg/dL    Albumin 3.0 (L) 3.2 - 4.9 g/dL    Total Protein 6.3 6.0 - 8.2 g/dL    Globulin 3.3 1.9 - 3.5 g/dL    A-G Ratio 0.9 g/dL   URIC ACID    Collection Time: 10/12/17 11:30 AM   Result Value Ref Range    Uric Acid 6.0 1.9 - 8.2 mg/dL   HEMOGLOBIN A1C    Collection Time: 10/12/17 11:30 AM   Result Value Ref Range    Glycohemoglobin 9.8 (H) 0.0 - 5.6 %     Est Avg Glucose 235 mg/dL   ESTIMATED GFR    Collection Time: 10/12/17 11:30 AM   Result Value Ref Range    GFR If African American >60 >60 mL/min/1.73 m 2    GFR If Non African American >60 >60 mL/min/1.73 m 2   POC UA    Collection Time: 10/12/17 12:33 PM   Result Value Ref Range    POC Color Yellow     POC Appearance Clear     POC Glucose Negative Negative mg/dL    POC Ketones Negative Negative mg/dL    POC Specific Gravity <=1.005 (A) 1.005 - 1.030    POC Blood Negative Negative    POC Urine PH 6.0 5.0 - 8.0    POC Protein Negative Negative mg/dL    POC Nitrites Negative Negative    POC Leukocyte Esterase Small (A) Negative   ACCU-CHEK GLUCOSE    Collection Time: 10/12/17  1:11 PM   Result Value Ref Range    Glucose - Accu-Ck 111 (H) 65 - 99 mg/dL   ACCU-CHEK GLUCOSE    Collection Time: 10/12/17  3:44 PM   Result Value Ref Range    Glucose - Accu-Ck 111 (H) 65 - 99 mg/dL   POCT Urinalysis    Collection Time: 10/13/17  9:16 AM   Result Value Ref Range    POC Color  Negative    POC Appearance  Negative    POC Leukocyte Esterase neg Negative    POC Nitrites neg Negative    POC Urobiligen  Negative (0.2) mg/dL    POC Protein trace Negative mg/dL    POC Urine PH 6.0 5.0 - 8.0    POC Blood neg Negative    POC Specific Gravity 1.020 <1.005 - >1.030    POC Ketones trace Negative mg/dL    POC Biliruben  Negative mg/dL    POC Glucose neg Negative mg/dL       A/P:  27 y.o.  at 30w0d presents for obstetric follow-up. A2GDM & HTN - most likely both are chronic with very elevated BP and HbA1C - all FSBS for last 2 days where WNL. Random was normal so will not change insulin regimen.    1.  Continue prenatal vitamins.  2.  Begin/continue kick counts at 28 weeks   3.  Watch weight gain.  4.  Increase water intake.  5.  Follow-up in 1 weeks.  6.  PTL/labor precautions given  7.  Continue Nifedipine and labetalol

## 2017-10-19 NOTE — PROGRESS NOTES
Pt here today for OB follow up  Pt states no states complaints   Reports +  [facility] # [phone]  Pharmacy Confirmed.

## 2017-10-19 NOTE — PROGRESS NOTES
Pt provided [facility] information.  They stated they have Medicaid starting November 1, but there is no information in the chart about which kind.    Her blood sugars dropped lower the last three days.  She states she's trying to be extra careful with her diet.

## 2017-10-26 ENCOUNTER — ROUTINE PRENATAL (OUTPATIENT)
Dept: OBGYN | Facility: CLINIC | Age: 27
End: 2017-10-26

## 2017-10-26 ENCOUNTER — HOSPITAL ENCOUNTER (OUTPATIENT)
Facility: MEDICAL CENTER | Age: 27
End: 2017-10-26
Attending: OBSTETRICS & GYNECOLOGY | Admitting: OBSTETRICS & GYNECOLOGY

## 2017-10-26 VITALS — BODY MASS INDEX: 36.09 KG/M2 | DIASTOLIC BLOOD PRESSURE: 103 MMHG | SYSTOLIC BLOOD PRESSURE: 158 MMHG | WEIGHT: 191 LBS

## 2017-10-26 VITALS
HEART RATE: 70 BPM | TEMPERATURE: 98.6 F | SYSTOLIC BLOOD PRESSURE: 138 MMHG | WEIGHT: 191 LBS | DIASTOLIC BLOOD PRESSURE: 87 MMHG | BODY MASS INDEX: 36.06 KG/M2 | HEIGHT: 61 IN

## 2017-10-26 DIAGNOSIS — I10 ESSENTIAL HYPERTENSION: ICD-10-CM

## 2017-10-26 DIAGNOSIS — O09.90 SUPERVISION OF HIGH RISK PREGNANCY, ANTEPARTUM: ICD-10-CM

## 2017-10-26 LAB
ALBUMIN SERPL BCP-MCNC: 3.2 G/DL (ref 3.2–4.9)
ALBUMIN/GLOB SERPL: 1 G/DL
ALP SERPL-CCNC: 130 U/L (ref 30–99)
ALT SERPL-CCNC: 48 U/L (ref 2–50)
ANION GAP SERPL CALC-SCNC: 9 MMOL/L (ref 0–11.9)
APPEARANCE UR: ABNORMAL
APPEARANCE UR: ABNORMAL
APPEARANCE UR: NORMAL
AST SERPL-CCNC: 58 U/L (ref 12–45)
BASOPHILS # BLD AUTO: 0.7 % (ref 0–1.8)
BASOPHILS # BLD: 0.07 K/UL (ref 0–0.12)
BILIRUB SERPL-MCNC: 0.4 MG/DL (ref 0.1–1.5)
BILIRUB UR STRIP-MCNC: NORMAL MG/DL
BUN SERPL-MCNC: 18 MG/DL (ref 8–22)
CALCIUM SERPL-MCNC: 9.1 MG/DL (ref 8.5–10.5)
CHLORIDE SERPL-SCNC: 104 MMOL/L (ref 96–112)
CO2 SERPL-SCNC: 22 MMOL/L (ref 20–33)
COLOR UR AUTO: NORMAL
COLOR UR AUTO: YELLOW
COLOR UR AUTO: YELLOW
CREAT SERPL-MCNC: 0.89 MG/DL (ref 0.5–1.4)
EOSINOPHIL # BLD AUTO: 0.09 K/UL (ref 0–0.51)
EOSINOPHIL NFR BLD: 0.9 % (ref 0–6.9)
ERYTHROCYTE [DISTWIDTH] IN BLOOD BY AUTOMATED COUNT: 45 FL (ref 35.9–50)
GFR SERPL CREATININE-BSD FRML MDRD: >60 ML/MIN/1.73 M 2
GLOBULIN SER CALC-MCNC: 3.1 G/DL (ref 1.9–3.5)
GLUCOSE SERPL-MCNC: 64 MG/DL (ref 65–99)
GLUCOSE UR QL STRIP.AUTO: NEGATIVE MG/DL
GLUCOSE UR QL STRIP.AUTO: NEGATIVE MG/DL
GLUCOSE UR STRIP.AUTO-MCNC: NORMAL MG/DL
HCT VFR BLD AUTO: 43.8 % (ref 37–47)
HGB BLD-MCNC: 14.6 G/DL (ref 12–16)
IMM GRANULOCYTES # BLD AUTO: 0.04 K/UL (ref 0–0.11)
IMM GRANULOCYTES NFR BLD AUTO: 0.4 % (ref 0–0.9)
KETONES UR QL STRIP.AUTO: NEGATIVE MG/DL
KETONES UR QL STRIP.AUTO: NEGATIVE MG/DL
KETONES UR STRIP.AUTO-MCNC: NORMAL MG/DL
LEUKOCYTE ESTERASE UR QL STRIP.AUTO: ABNORMAL
LEUKOCYTE ESTERASE UR QL STRIP.AUTO: ABNORMAL
LEUKOCYTE ESTERASE UR QL STRIP.AUTO: NORMAL
LYMPHOCYTES # BLD AUTO: 2.87 K/UL (ref 1–4.8)
LYMPHOCYTES NFR BLD: 29 % (ref 22–41)
MCH RBC QN AUTO: 29.4 PG (ref 27–33)
MCHC RBC AUTO-ENTMCNC: 33.3 G/DL (ref 33.6–35)
MCV RBC AUTO: 88.3 FL (ref 81.4–97.8)
MONOCYTES # BLD AUTO: 0.58 K/UL (ref 0–0.85)
MONOCYTES NFR BLD AUTO: 5.9 % (ref 0–13.4)
NEUTROPHILS # BLD AUTO: 6.25 K/UL (ref 2–7.15)
NEUTROPHILS NFR BLD: 63.1 % (ref 44–72)
NITRITE UR QL STRIP.AUTO: NEGATIVE
NITRITE UR QL STRIP.AUTO: NEGATIVE
NITRITE UR QL STRIP.AUTO: NORMAL
NRBC # BLD AUTO: 0 K/UL
NRBC BLD AUTO-RTO: 0 /100 WBC
PH UR STRIP.AUTO: 6.5 [PH]
PH UR STRIP.AUTO: 6.5 [PH]
PH UR STRIP.AUTO: 6.5 [PH] (ref 5–8)
PLATELET # BLD AUTO: 290 K/UL (ref 164–446)
PMV BLD AUTO: 10.7 FL (ref 9–12.9)
POTASSIUM SERPL-SCNC: 4.8 MMOL/L (ref 3.6–5.5)
PROT SERPL-MCNC: 6.3 G/DL (ref 6–8.2)
PROT UR QL STRIP: 100 MG/DL
RBC # BLD AUTO: 4.96 M/UL (ref 4.2–5.4)
RBC UR QL AUTO: ABNORMAL
RBC UR QL AUTO: NEGATIVE
RBC UR QL AUTO: NORMAL
SODIUM SERPL-SCNC: 135 MMOL/L (ref 135–145)
SP GR UR STRIP.AUTO: 1.01
SP GR UR: 1.01
SP GR UR: <=1.005
URATE SERPL-MCNC: 7.2 MG/DL (ref 1.9–8.2)
UROBILINOGEN UR STRIP-MCNC: NORMAL MG/DL
WBC # BLD AUTO: 9.9 K/UL (ref 4.8–10.8)

## 2017-10-26 PROCEDURE — 90471 IMMUNIZATION ADMIN: CPT | Performed by: OBSTETRICS & GYNECOLOGY

## 2017-10-26 PROCEDURE — 90686 IIV4 VACC NO PRSV 0.5 ML IM: CPT | Performed by: OBSTETRICS & GYNECOLOGY

## 2017-10-26 PROCEDURE — 90040 PR PRENATAL FOLLOW UP: CPT | Performed by: OBSTETRICS & GYNECOLOGY

## 2017-10-26 PROCEDURE — 84550 ASSAY OF BLOOD/URIC ACID: CPT

## 2017-10-26 PROCEDURE — 85025 COMPLETE CBC W/AUTO DIFF WBC: CPT

## 2017-10-26 PROCEDURE — 36415 COLL VENOUS BLD VENIPUNCTURE: CPT

## 2017-10-26 PROCEDURE — 81002 URINALYSIS NONAUTO W/O SCOPE: CPT | Mod: 91

## 2017-10-26 PROCEDURE — 80053 COMPREHEN METABOLIC PANEL: CPT

## 2017-10-26 PROCEDURE — 59025 FETAL NON-STRESS TEST: CPT | Performed by: OBSTETRICS & GYNECOLOGY

## 2017-10-26 RX ORDER — NIFEDIPINE 30 MG/1
30 TABLET, EXTENDED RELEASE ORAL DAILY
Qty: 30 TAB | Refills: 3 | Status: SHIPPED | OUTPATIENT
Start: 2017-10-26 | End: 2019-01-29

## 2017-10-26 RX ORDER — METHYLDOPA 250 MG/1
250 TABLET, FILM COATED ORAL 2 TIMES DAILY
Qty: 30 TAB | Refills: 2 | Status: SHIPPED | OUTPATIENT
Start: 2017-10-26 | End: 2019-01-29

## 2017-10-26 ASSESSMENT — PATIENT HEALTH QUESTIONNAIRE - PHQ9: CLINICAL INTERPRETATION OF PHQ2 SCORE: 0

## 2017-10-26 ASSESSMENT — PAIN SCALES - GENERAL: PAINLEVEL_OUTOF10: 0

## 2017-10-26 NOTE — PROGRESS NOTES
EDC- , EGA- 31    1300- Received report from JUNAITA Velez.  Assumed care.    1310- Dr. Drake at .  Discussed new prescription for methyldopa,  24 hour urine collection instructions, and signs/symptoms of superimposed preeclampsia.  Pt verbalizes understanding.   labor precautions and kick counts also discussed.    1355- Pt provided with 24 hour urine collection supplies and order slip.  Pt discharged home ambulatory in stable condition with FOB at side.

## 2017-10-26 NOTE — PROGRESS NOTES
Pt here for GDM f/u. Denies any complications today. Reports +FM.  [facility] phone# [phone]  Pharmacy verified with pt.  GEOVANNI appt 11/15/17  Flu vaccine today. Pt states she will have Tdap next visit.

## 2017-10-26 NOTE — PROGRESS NOTES
[facility] DELIVERY TRIAGE PROGRESS NOTE    PATIENT ID:  NAME:  Angelina Cox  MRN:               [id number]  YOB: 1990     27 y.o. female  at 31w0d.    Subjective: Pt is sent from the [facility] due to elevation in blood pressure, at the clinic her BP was 154/103. She has a hx of Essential HTN and GDM. HTN at home s being treated with Labetaolol 200 BID and Procardia xl 30 qd. Pt denies any changes in her vision, abdominal pain, swelling in her legs or palpitations.       negative  For CTXS.   negative Feels pain   negative for LOF  negative for vaginal bleeding.   positive for fetal movement    ROS: Patient denies any fever chills, nausea, vomiting, headache, chest pain, shortness of breath, or dysuria or unusual swelling of hands or feet.     Objective:    Vitals:    10/26/17 1107 10/26/17 1122 10/26/17 1137 10/26/17 1152   BP: 145/93 151/93 142/90 145/93   Pulse: 71 66 65 66   Weight:       Height:         No data recorded.    General: No acute distress, resting comfortably in bed.  HEENT: normocephalic, nontraumatic, PERRLA, EOMI  Cardiovascular: Heart RRR with no murmurs, rubs or gallops. Distal Pulses 2+  Respiratory: symmetric chest expansion, lungs CTAB, with no wheezes, rales, rhonci  Abdomen: gravid, nontender  Musculoskeletal: strength 5/5 in four extremities, negative clonus and DTR 2+  Neuro: non focal with no numbness, tingling or changes in sensation      [place]: Uterine Contractions none  FHRM: Baseline 140, moderate variability, appropriate tracing for gestational age, no d-cells noted     Assessment: 27 y.o. female  at 31w0d here for elevated BP sent from [facility].     Plan:   1. Discharge home with return precautions and instructions to collect 24 hour urine.  2. Pt had an slightly elevated AST of 58, Normal uric acid of 7.2. Slightly Increased protein in urine   3. Pt will be started on Methyl dopa 250 mg BID in addition to her labetalol 200mg BID  and procardia 30mg Qday.    4. Pt will return to [facility] on Monday   5. Will watch for any signs of superimposed pre-eclampsia     Discussed case with Dr. Noble TPJOYA Attending. Case was discussed and attending agreed with plan prior to discharge of patient.

## 2017-10-26 NOTE — PROGRESS NOTES
Angelina Cox 27 y.o.  31w0d here today for obstetrical visit. Patient reports good  fetal movement. denies contractions, denies vaginal bleeding, denies loss of fluid.    Pregnancy is complicated by   Patient Active Problem List    Diagnosis Date Noted   • Essential hypertension 10/19/2017   • Late prenatal care 10/19/2017   • Diabetes mellitus affecting pregnancy in third trimester 10/16/2017   • Supervision of high-risk pregnancy 10/05/2017       GBS not done  Fasting blood sugars range <90  Postprandial blood sugars range <140    Patient to have flu shot and tdap today    On Procardia 30 mg daily  Labetalol 200 mg by mouth twice a day    Perinatology appointment in 2 weeks      Insulin regimen  NPH a.m. 10 , regular a.m.5  Regular with dinner 5  NPH at bedtime 10     Followup in 1 weeks   labor precautions are reviewed  Continue kick counts daily after 28 weeks  NST twice weekly after 32 weeks if on insulin    To labor and delivery for serial blood pressures and labs rule out severe preeclampsia or superimposed

## 2017-10-26 NOTE — PROGRESS NOTES
Pt here from MD office for labs and serial blood pressures. Pt to #2 oriented to room and call system.  at bedside. External monitors placed, serial bloop pressured begun. Labs ordered and drawn. Urine tested as well. Pt denies pain, uc's bleeding or leaking of fluid. Report to Mariely

## 2017-10-28 LAB
CREAT UR-MCNC: 46.8 MG/DL
PROT UR-MCNC: 44 MG/DL (ref 0–15)
PROT/CREAT UR: 940 MG/G (ref 10–107)

## 2017-10-30 ENCOUNTER — ROUTINE PRENATAL (OUTPATIENT)
Dept: OBGYN | Facility: CLINIC | Age: 27
End: 2017-10-30

## 2017-10-30 VITALS — DIASTOLIC BLOOD PRESSURE: 92 MMHG | WEIGHT: 191 LBS | BODY MASS INDEX: 36.09 KG/M2 | SYSTOLIC BLOOD PRESSURE: 138 MMHG

## 2017-10-30 DIAGNOSIS — O24.913 DIABETES MELLITUS AFFECTING PREGNANCY IN THIRD TRIMESTER: ICD-10-CM

## 2017-10-30 DIAGNOSIS — O09.90 SUPERVISION OF HIGH RISK PREGNANCY, ANTEPARTUM: ICD-10-CM

## 2017-10-30 LAB
APPEARANCE UR: NORMAL
BILIRUB UR STRIP-MCNC: NORMAL MG/DL
COLOR UR AUTO: NORMAL
GLUCOSE UR STRIP.AUTO-MCNC: NORMAL MG/DL
KETONES UR STRIP.AUTO-MCNC: NORMAL MG/DL
LEUKOCYTE ESTERASE UR QL STRIP.AUTO: NORMAL
NITRITE UR QL STRIP.AUTO: NORMAL
PH UR STRIP.AUTO: 6 [PH] (ref 5–8)
PROT UR QL STRIP: 30 MG/DL
RBC UR QL AUTO: NORMAL
SP GR UR STRIP.AUTO: 1
UROBILINOGEN UR STRIP-MCNC: NORMAL MG/DL

## 2017-10-30 PROCEDURE — 81002 URINALYSIS NONAUTO W/O SCOPE: CPT | Performed by: OBSTETRICS & GYNECOLOGY

## 2017-10-30 PROCEDURE — 90040 PR PRENATAL FOLLOW UP: CPT | Performed by: OBSTETRICS & GYNECOLOGY

## 2017-10-30 NOTE — PROGRESS NOTES
OB f/u. + fetal movement.  No VB, LOF or UC's.  [facility] phone # [phone]  Preferred pharmacy confirmed.

## 2017-10-30 NOTE — PROGRESS NOTES
27 y.o.   31w4d with diagnosis of GDM: gestational. Chronic HTN : on meds , obesity    Subjective: Fetal movement: positive, Vaginal Bleeding:negative, Loss of Fluid: negative, Following diet :positive, Insulin Use:positive. Blood sugar logs reviewed and are posted in diabetic flowsheet.   Patient Active Problem List    Diagnosis Date Noted   • Essential hypertension 10/19/2017   • Late prenatal care 10/19/2017   • Diabetes mellitus affecting pregnancy in third trimester 10/16/2017   • Supervision of high-risk pregnancy 10/05/2017        24 hr Protein : Sent in Saturday 10/28: lost     Current Treatment:     AM     insulin injections: regular: 5, NPH: 10    PM:    insulin injections: regular: 5    QHS   insulin injections: NPH: 10    FBS Range: < 90  Postprandial Range: most < 130    Vitals:    10/30/17 0900   BP: 138/92   Weight: 86.6 kg (191 lb)       NST:    discussed    Ass:     31w4d  GDM: Class _A-2 _____  Good control positive  Chronic HTN :  R/O Sup[erimposed PIH : 24 hr Urine lost ,   Borderline elevated Transaminases    P. New(No)  Continue Same ( Yes )Diabetic treatment Plan  AM: insulin injections: regular: 5, NPH: 10  PM insulin injections: NPH: 5  QHS:insulin injections: NPH: 10                   Need Repeat 24 hr Urine / PIH labs   : if Protein Elevated , will admit for Delivery after Steroids   NST 2x /week after 32 weeks  Frequent assessment of  Fetal weight  IOL /C/S delivery at 38-39 weeks

## 2017-10-31 ENCOUNTER — APPOINTMENT (OUTPATIENT)
Dept: LAB | Facility: MEDICAL CENTER | Age: 27
End: 2017-10-31
Attending: OBSTETRICS & GYNECOLOGY
Payer: COMMERCIAL

## 2017-11-01 ENCOUNTER — HOSPITAL ENCOUNTER (OUTPATIENT)
Dept: LAB | Facility: MEDICAL CENTER | Age: 27
End: 2017-11-01
Attending: STUDENT IN AN ORGANIZED HEALTH CARE EDUCATION/TRAINING PROGRAM
Payer: MEDICAID

## 2017-11-01 ENCOUNTER — HOSPITAL ENCOUNTER (OUTPATIENT)
Dept: LAB | Facility: MEDICAL CENTER | Age: 27
End: 2017-11-01
Attending: OBSTETRICS & GYNECOLOGY
Payer: MEDICAID

## 2017-11-01 DIAGNOSIS — O24.913 DIABETES MELLITUS AFFECTING PREGNANCY IN THIRD TRIMESTER: ICD-10-CM

## 2017-11-01 DIAGNOSIS — O09.90 SUPERVISION OF HIGH RISK PREGNANCY, ANTEPARTUM: ICD-10-CM

## 2017-11-01 DIAGNOSIS — I10 ESSENTIAL HYPERTENSION: ICD-10-CM

## 2017-11-01 LAB
AST SERPL-CCNC: 42 U/L (ref 12–45)
BUN SERPL-MCNC: 21 MG/DL (ref 8–22)
CREAT SERPL-MCNC: 1.05 MG/DL (ref 0.5–1.4)
ERYTHROCYTE [DISTWIDTH] IN BLOOD BY AUTOMATED COUNT: 44.8 FL (ref 35.9–50)
GFR SERPL CREATININE-BSD FRML MDRD: >60 ML/MIN/1.73 M 2
HCT VFR BLD AUTO: 42.6 % (ref 37–47)
HGB BLD-MCNC: 14.2 G/DL (ref 12–16)
MCH RBC QN AUTO: 28.9 PG (ref 27–33)
MCHC RBC AUTO-ENTMCNC: 33.3 G/DL (ref 33.6–35)
MCV RBC AUTO: 86.6 FL (ref 81.4–97.8)
PLATELET # BLD AUTO: 264 K/UL (ref 164–446)
PMV BLD AUTO: 11.4 FL (ref 9–12.9)
PROT 24H UR-MCNC: 847.9 MG/24 HR (ref 30–150)
PROT 24H UR-MRATE: 35.7 MG/DL (ref 0–15)
RBC # BLD AUTO: 4.92 M/UL (ref 4.2–5.4)
SPECIMEN VOL UR: 2375 ML
URATE SERPL-MCNC: 7.5 MG/DL (ref 1.9–8.2)
WBC # BLD AUTO: 12.2 K/UL (ref 4.8–10.8)

## 2017-11-01 PROCEDURE — 84450 TRANSFERASE (AST) (SGOT): CPT

## 2017-11-01 PROCEDURE — 82565 ASSAY OF CREATININE: CPT

## 2017-11-01 PROCEDURE — 81050 URINALYSIS VOLUME MEASURE: CPT

## 2017-11-01 PROCEDURE — 84156 ASSAY OF PROTEIN URINE: CPT

## 2017-11-01 PROCEDURE — 36415 COLL VENOUS BLD VENIPUNCTURE: CPT

## 2017-11-01 PROCEDURE — 84550 ASSAY OF BLOOD/URIC ACID: CPT

## 2017-11-01 PROCEDURE — 85027 COMPLETE CBC AUTOMATED: CPT

## 2017-11-01 PROCEDURE — 84520 ASSAY OF UREA NITROGEN: CPT

## 2017-11-02 ENCOUNTER — ROUTINE PRENATAL (OUTPATIENT)
Dept: OBGYN | Facility: CLINIC | Age: 27
End: 2017-11-02

## 2017-11-02 ENCOUNTER — APPOINTMENT (OUTPATIENT)
Dept: RADIOLOGY | Facility: MEDICAL CENTER | Age: 27
End: 2017-11-02
Attending: STUDENT IN AN ORGANIZED HEALTH CARE EDUCATION/TRAINING PROGRAM
Payer: MEDICAID

## 2017-11-02 ENCOUNTER — HOSPITAL ENCOUNTER (INPATIENT)
Facility: MEDICAL CENTER | Age: 27
LOS: 6 days | End: 2017-11-08
Attending: OBSTETRICS & GYNECOLOGY | Admitting: OBSTETRICS & GYNECOLOGY
Payer: MEDICAID

## 2017-11-02 VITALS — SYSTOLIC BLOOD PRESSURE: 156 MMHG | WEIGHT: 191 LBS | DIASTOLIC BLOOD PRESSURE: 100 MMHG | BODY MASS INDEX: 36.09 KG/M2

## 2017-11-02 DIAGNOSIS — O24.913 DIABETES MELLITUS AFFECTING PREGNANCY IN THIRD TRIMESTER: ICD-10-CM

## 2017-11-02 DIAGNOSIS — O09.90 SUPERVISION OF HIGH RISK PREGNANCY, ANTEPARTUM: ICD-10-CM

## 2017-11-02 DIAGNOSIS — I10 ESSENTIAL HYPERTENSION: ICD-10-CM

## 2017-11-02 LAB
ALBUMIN SERPL BCP-MCNC: 3 G/DL (ref 3.2–4.9)
ALBUMIN/GLOB SERPL: 0.9 G/DL
ALP SERPL-CCNC: 130 U/L (ref 30–99)
ALT SERPL-CCNC: 37 U/L (ref 2–50)
ANION GAP SERPL CALC-SCNC: 9 MMOL/L (ref 0–11.9)
APPEARANCE UR: NORMAL
AST SERPL-CCNC: 42 U/L (ref 12–45)
BASOPHILS # BLD AUTO: 0.6 % (ref 0–1.8)
BASOPHILS # BLD: 0.07 K/UL (ref 0–0.12)
BILIRUB SERPL-MCNC: 0.3 MG/DL (ref 0.1–1.5)
BILIRUB UR STRIP-MCNC: NORMAL MG/DL
BUN SERPL-MCNC: 19 MG/DL (ref 8–22)
CALCIUM SERPL-MCNC: 9 MG/DL (ref 8.5–10.5)
CHLORIDE SERPL-SCNC: 106 MMOL/L (ref 96–112)
CO2 SERPL-SCNC: 20 MMOL/L (ref 20–33)
COLOR UR AUTO: NORMAL
CREAT SERPL-MCNC: 0.85 MG/DL (ref 0.5–1.4)
EOSINOPHIL # BLD AUTO: 0.1 K/UL (ref 0–0.51)
EOSINOPHIL NFR BLD: 0.9 % (ref 0–6.9)
ERYTHROCYTE [DISTWIDTH] IN BLOOD BY AUTOMATED COUNT: 45.7 FL (ref 35.9–50)
GFR SERPL CREATININE-BSD FRML MDRD: >60 ML/MIN/1.73 M 2
GLOBULIN SER CALC-MCNC: 3.3 G/DL (ref 1.9–3.5)
GLUCOSE BLD-MCNC: 65 MG/DL (ref 65–99)
GLUCOSE BLD-MCNC: 65 MG/DL (ref 65–99)
GLUCOSE BLD-MCNC: 88 MG/DL (ref 65–99)
GLUCOSE SERPL-MCNC: 62 MG/DL (ref 65–99)
GLUCOSE UR STRIP.AUTO-MCNC: NEGATIVE MG/DL
HCT VFR BLD AUTO: 44.2 % (ref 37–47)
HGB BLD-MCNC: 15 G/DL (ref 12–16)
IMM GRANULOCYTES # BLD AUTO: 0.06 K/UL (ref 0–0.11)
IMM GRANULOCYTES NFR BLD AUTO: 0.5 % (ref 0–0.9)
KETONES UR STRIP.AUTO-MCNC: NEGATIVE MG/DL
LEUKOCYTE ESTERASE UR QL STRIP.AUTO: NEGATIVE
LYMPHOCYTES # BLD AUTO: 3.71 K/UL (ref 1–4.8)
LYMPHOCYTES NFR BLD: 32.6 % (ref 22–41)
MCH RBC QN AUTO: 29.8 PG (ref 27–33)
MCHC RBC AUTO-ENTMCNC: 33.9 G/DL (ref 33.6–35)
MCV RBC AUTO: 87.9 FL (ref 81.4–97.8)
MONOCYTES # BLD AUTO: 0.68 K/UL (ref 0–0.85)
MONOCYTES NFR BLD AUTO: 6 % (ref 0–13.4)
NEUTROPHILS # BLD AUTO: 6.75 K/UL (ref 2–7.15)
NEUTROPHILS NFR BLD: 59.4 % (ref 44–72)
NITRITE UR QL STRIP.AUTO: NEGATIVE
NRBC # BLD AUTO: 0 K/UL
NRBC BLD AUTO-RTO: 0 /100 WBC
NST ACOUSTIC STIMULATION: NO
NST ACTION NECESSARY: ABNORMAL
NST ASSESSMENT: ABNORMAL
NST BASELINE: 130
NST INDICATIONS: ABNORMAL
NST OTHER DATA: ABNORMAL
NST READ BY: ABNORMAL
NST RETURN: ABNORMAL
NST UTERINE ACTIVITY: ABNORMAL
PH UR STRIP.AUTO: 6.5 [PH] (ref 5–8)
PLATELET # BLD AUTO: 270 K/UL (ref 164–446)
PMV BLD AUTO: 11.1 FL (ref 9–12.9)
POTASSIUM SERPL-SCNC: 4.2 MMOL/L (ref 3.6–5.5)
PROT SERPL-MCNC: 6.3 G/DL (ref 6–8.2)
PROT UR QL STRIP: NORMAL MG/DL
RBC # BLD AUTO: 5.03 M/UL (ref 4.2–5.4)
RBC UR QL AUTO: NEGATIVE
SODIUM SERPL-SCNC: 135 MMOL/L (ref 135–145)
SP GR UR STRIP.AUTO: 1.01
URATE SERPL-MCNC: 7.4 MG/DL (ref 1.9–8.2)
UROBILINOGEN UR STRIP-MCNC: NORMAL MG/DL
WBC # BLD AUTO: 11.4 K/UL (ref 4.8–10.8)

## 2017-11-02 PROCEDURE — 80053 COMPREHEN METABOLIC PANEL: CPT

## 2017-11-02 PROCEDURE — 82962 GLUCOSE BLOOD TEST: CPT | Mod: 91

## 2017-11-02 PROCEDURE — 87653 STREP B DNA AMP PROBE: CPT

## 2017-11-02 PROCEDURE — 36415 COLL VENOUS BLD VENIPUNCTURE: CPT

## 2017-11-02 PROCEDURE — 85025 COMPLETE CBC W/AUTO DIFF WBC: CPT

## 2017-11-02 PROCEDURE — 700102 HCHG RX REV CODE 250 W/ 637 OVERRIDE(OP): Performed by: OBSTETRICS & GYNECOLOGY

## 2017-11-02 PROCEDURE — 76815 OB US LIMITED FETUS(S): CPT

## 2017-11-02 PROCEDURE — 770002 HCHG ROOM/CARE - OB PRIVATE (112)

## 2017-11-02 PROCEDURE — 90040 PR PRENATAL FOLLOW UP: CPT | Performed by: OBSTETRICS & GYNECOLOGY

## 2017-11-02 PROCEDURE — 700101 HCHG RX REV CODE 250: Performed by: OBSTETRICS & GYNECOLOGY

## 2017-11-02 PROCEDURE — 90715 TDAP VACCINE 7 YRS/> IM: CPT | Performed by: OBSTETRICS & GYNECOLOGY

## 2017-11-02 PROCEDURE — 59025 FETAL NON-STRESS TEST: CPT | Performed by: OBSTETRICS & GYNECOLOGY

## 2017-11-02 PROCEDURE — 3E0234Z INTRODUCTION OF SERUM, TOXOID AND VACCINE INTO MUSCLE, PERCUTANEOUS APPROACH: ICD-10-PCS | Performed by: OBSTETRICS & GYNECOLOGY

## 2017-11-02 PROCEDURE — 700111 HCHG RX REV CODE 636 W/ 250 OVERRIDE (IP): Performed by: STUDENT IN AN ORGANIZED HEALTH CARE EDUCATION/TRAINING PROGRAM

## 2017-11-02 PROCEDURE — 81002 URINALYSIS NONAUTO W/O SCOPE: CPT | Performed by: OBSTETRICS & GYNECOLOGY

## 2017-11-02 PROCEDURE — 700102 HCHG RX REV CODE 250 W/ 637 OVERRIDE(OP): Performed by: STUDENT IN AN ORGANIZED HEALTH CARE EDUCATION/TRAINING PROGRAM

## 2017-11-02 PROCEDURE — 90471 IMMUNIZATION ADMIN: CPT | Performed by: OBSTETRICS & GYNECOLOGY

## 2017-11-02 PROCEDURE — 84550 ASSAY OF BLOOD/URIC ACID: CPT

## 2017-11-02 PROCEDURE — 302790 HCHG STAT ANTEPARTUM CARE, DAILY

## 2017-11-02 PROCEDURE — A9270 NON-COVERED ITEM OR SERVICE: HCPCS | Performed by: STUDENT IN AN ORGANIZED HEALTH CARE EDUCATION/TRAINING PROGRAM

## 2017-11-02 PROCEDURE — 700105 HCHG RX REV CODE 258: Performed by: OBSTETRICS & GYNECOLOGY

## 2017-11-02 RX ORDER — HYDRALAZINE HYDROCHLORIDE 20 MG/ML
5-10 INJECTION INTRAMUSCULAR; INTRAVENOUS PRN
Status: DISCONTINUED | OUTPATIENT
Start: 2017-11-02 | End: 2017-11-04

## 2017-11-02 RX ORDER — METHYLDOPA 250 MG/1
250 TABLET, FILM COATED ORAL 3 TIMES DAILY
Status: DISCONTINUED | OUTPATIENT
Start: 2017-11-02 | End: 2017-11-04

## 2017-11-02 RX ORDER — BETAMETHASONE SODIUM PHOSPHATE AND BETAMETHASONE ACETATE 3; 3 MG/ML; MG/ML
12 INJECTION, SUSPENSION INTRA-ARTICULAR; INTRALESIONAL; INTRAMUSCULAR; SOFT TISSUE EVERY 24 HOURS
Status: COMPLETED | OUTPATIENT
Start: 2017-11-02 | End: 2017-11-03

## 2017-11-02 RX ORDER — LABETALOL HYDROCHLORIDE 5 MG/ML
20-80 INJECTION, SOLUTION INTRAVENOUS PRN
Status: DISCONTINUED | OUTPATIENT
Start: 2017-11-02 | End: 2017-11-04

## 2017-11-02 RX ORDER — DEXTROSE, SODIUM CHLORIDE, SODIUM LACTATE, POTASSIUM CHLORIDE, AND CALCIUM CHLORIDE 5; .6; .31; .03; .02 G/100ML; G/100ML; G/100ML; G/100ML; G/100ML
INJECTION, SOLUTION INTRAVENOUS CONTINUOUS
Status: DISCONTINUED | OUTPATIENT
Start: 2017-11-02 | End: 2017-11-02

## 2017-11-02 RX ORDER — NIFEDIPINE 30 MG/1
30 TABLET, EXTENDED RELEASE ORAL
Status: DISCONTINUED | OUTPATIENT
Start: 2017-11-02 | End: 2017-11-04

## 2017-11-02 RX ORDER — LABETALOL 100 MG/1
200 TABLET, FILM COATED ORAL TWICE DAILY
Status: DISCONTINUED | OUTPATIENT
Start: 2017-11-02 | End: 2017-11-04

## 2017-11-02 RX ADMIN — INSULIN HUMAN 10 UNITS: 100 INJECTION, SUSPENSION SUBCUTANEOUS at 21:26

## 2017-11-02 RX ADMIN — LABETALOL HYDROCHLORIDE 200 MG: 100 TABLET, FILM COATED ORAL at 20:17

## 2017-11-02 RX ADMIN — METHYLDOPA 250 MG: 250 TABLET ORAL at 20:17

## 2017-11-02 RX ADMIN — LABETALOL HYDROCHLORIDE 40 MG: 5 INJECTION, SOLUTION INTRAVENOUS at 13:00

## 2017-11-02 RX ADMIN — METHYLDOPA 250 MG: 250 TABLET ORAL at 15:23

## 2017-11-02 RX ADMIN — BETAMETHASONE SODIUM PHOSPHATE AND BETAMETHASONE ACETATE 12 MG: 3; 3 INJECTION, SUSPENSION INTRA-ARTICULAR; INTRALESIONAL; INTRAMUSCULAR at 11:01

## 2017-11-02 RX ADMIN — INSULIN HUMAN 5 UNITS: 100 INJECTION, SOLUTION PARENTERAL at 20:12

## 2017-11-02 RX ADMIN — SODIUM CHLORIDE, SODIUM LACTATE, POTASSIUM CHLORIDE, CALCIUM CHLORIDE AND DEXTROSE MONOHYDRATE: 5; 600; 310; 30; 20 INJECTION, SOLUTION INTRAVENOUS at 14:16

## 2017-11-02 RX ADMIN — LABETALOL HYDROCHLORIDE 20 MG: 5 INJECTION, SOLUTION INTRAVENOUS at 10:57

## 2017-11-02 ASSESSMENT — LIFESTYLE VARIABLES
EVER_SMOKED: NEVER
ALCOHOL_USE: NO
DO YOU DRINK ALCOHOL: NO

## 2017-11-02 ASSESSMENT — PAIN SCALES - GENERAL: PAINLEVEL_OUTOF10: 0

## 2017-11-02 NOTE — PROGRESS NOTES
Patient came in for ob check.EFM applied and POC discussed.she is due 12-28 which makes her 32 weeks.she has high blood pressure and is on labetalol and procardia.Dr Condon called-patient is to be admitted to antepartum.

## 2017-11-02 NOTE — PROGRESS NOTES
[facility] DELIVERY PROGRESS NOTE    PATIENT ID:  NAME:  Angelina Cox  MRN:               [id number]  YOB: 1990        Subjective: 27 y.o. female  at 32w0d. Came in this morning from [facility] due to elevated blood pressures despite being on three medications at home. Today when pt was seen at [facility] her blood pressure was elevates in 160-170 systolic 100's diastolic . Pt today denies any visual deficits,headache, SOB, abdominal pain or leg swelling.     negative  For CTXS.   negative Feels pain   negative for LOF  negative for vaginal bleeding.   positive for fetal movement    Objective:    Vitals:    17 1028 17 1043 17 1056 17 1058   BP: (!) 168/105 (!) 162/101 (!) 171/101 152/103   Pulse: 68 76 67 68   Resp:       Temp:       Weight:       Height:           Cervix: deferred   Rent[place]: Irregular Uterine Contractions Q  FHRM: Baseline 140, Accels to positive, moderate variability      Assessment: 27 y.o. female  at 32w0d.    Plan:   1. Continue home medication of Procardia xl 30, Labetalol 200mg BID, Methyldopa 250mg TID   2. HTN protocol in place  3. Follow CBC CMP and uric acid   4. Steriods started   5. Monitor Blood Sugars and HTN   6. Pt would like  have a  which will attempted but precautions provided that a caesarian maybe needed. Consent will be signed

## 2017-11-02 NOTE — PROGRESS NOTES
Pt denies CTX, LOF, VB or any other c/o.  Good fetal movement. States taking all BP meds and did so this am.    Lab:  Recent Results (from the past 672 hour(s))   CHLAMYDIA/GC PCR URINE OR SWAB    Collection Time: 10/05/17 10:15 AM   Result Value Ref Range    Source GENITAL     C. trachomatis by PCR Negative Negative    N. gonorrhoeae by PCR Negative Negative   PREG CNTR PRENATAL PN    Collection Time: 10/05/17  1:05 PM   Result Value Ref Range    Color Yellow     Character Cloudy (A)     Specific Gravity 1.038 <1.035    Ph 6.5 5.0 - 8.0    Glucose >=1000 (A) Negative mg/dL    Ketones Negative Negative mg/dL    Protein Negative Negative mg/dL    Bilirubin Negative Negative    Urobilinogen, Urine 0.2 Negative    Nitrite Negative Negative    Leukocyte Esterase Negative Negative    Occult Blood Negative Negative    Micro Urine Req Microscopic     Rubella IgG Antibody 24.30 IU/mL    Syphilis, Treponemal Qual Non Reactive Non Reactive    Hepatitis B Surface Antigen Negative Negative    WBC 13.9 (H) 4.8 - 10.8 K/uL    RBC 5.16 4.20 - 5.40 M/uL    Hemoglobin 15.1 12.0 - 16.0 g/dL    Hematocrit 45.4 37.0 - 47.0 %    MCV 88.0 81.4 - 97.8 fL    MCH 29.3 27.0 - 33.0 pg    MCHC 33.3 (L) 33.6 - 35.0 g/dL    RDW 43.7 35.9 - 50.0 fL    Platelet Count 318 164 - 446 K/uL    MPV 11.2 9.0 - 12.9 fL    Neutrophils-Polys 66.30 44.00 - 72.00 %    Lymphocytes 25.30 22.00 - 41.00 %    Monocytes 6.50 0.00 - 13.40 %    Eosinophils 0.90 0.00 - 6.90 %    Basophils 0.60 0.00 - 1.80 %    Immature Granulocytes 0.40 0.00 - 0.90 %    Nucleated RBC 0.00 /100 WBC    Neutrophils (Absolute) 9.22 (H) 2.00 - 7.15 K/uL    Lymphs (Absolute) 3.52 1.00 - 4.80 K/uL    Monos (Absolute) 0.90 (H) 0.00 - 0.85 K/uL    Eos (Absolute) 0.12 0.00 - 0.51 K/uL    Baso (Absolute) 0.08 0.00 - 0.12 K/uL    Immature Granulocytes (abs) 0.05 0.00 - 0.11 K/uL    NRBC (Absolute) 0.00 K/uL    Culture Indicated Yes UA Culture   COMP METABOLIC PANEL    Collection Time: 10/05/17   1:05 PM   Result Value Ref Range    Sodium 135 135 - 145 mmol/L    Potassium 4.4 3.6 - 5.5 mmol/L    Chloride 102 96 - 112 mmol/L    Co2 22 20 - 33 mmol/L    Anion Gap 11.0 0.0 - 11.9    Glucose 219 (H) 65 - 99 mg/dL    Bun 13 8 - 22 mg/dL    Creatinine 0.78 0.50 - 1.40 mg/dL    Calcium 9.1 8.5 - 10.5 mg/dL    AST(SGOT) 41 12 - 45 U/L    ALT(SGPT) 28 2 - 50 U/L    Alkaline Phosphatase 115 (H) 30 - 99 U/L    Total Bilirubin 0.3 0.1 - 1.5 mg/dL    Albumin 3.2 3.2 - 4.9 g/dL    Total Protein 6.6 6.0 - 8.2 g/dL    Globulin 3.4 1.9 - 3.5 g/dL    A-G Ratio 0.9 g/dL   URIC ACID    Collection Time: 10/05/17  1:05 PM   Result Value Ref Range    Uric Acid 4.8 1.9 - 8.2 mg/dL   HIV ANTIBODIES    Collection Time: 10/05/17  1:05 PM   Result Value Ref Range    HIV Ag/Ab Combo Assay Non Reactive Non Reactive   CHLAMYDIA/GC PCR URINE OR SWAB    Collection Time: 10/05/17  1:05 PM   Result Value Ref Range    Source Urine     C. trachomatis by PCR Negative Negative    N. gonorrhoeae by PCR Negative Negative   OP PRENATAL PANEL-BLOOD BANK    Collection Time: 10/05/17  1:05 PM   Result Value Ref Range    ABO Grouping Only O     Rh Grouping Only POS     Antibody Screen Scrn NEG    URINE MICROSCOPIC (W/UA)    Collection Time: 10/05/17  1:05 PM   Result Value Ref Range    WBC 5-10 (A) /hpf    RBC 2-5 (A) /hpf    Bacteria Moderate (A) None /hpf    Epithelial Cells Moderate (A) /hpf    Hyaline Cast 0-2 /lpf   ESTIMATED GFR    Collection Time: 10/05/17  1:05 PM   Result Value Ref Range    GFR If African American >60 >60 mL/min/1.73 m 2    GFR If Non African American >60 >60 mL/min/1.73 m 2   URINE CULTURE(NEW)    Collection Time: 10/05/17  1:05 PM   Result Value Ref Range    Significant Indicator NEG     Source UR     Urine Culture Mixed skin ap >100,000 cfu/mL    GLUCOSE 1HR GESTATIONAL    Collection Time: 10/05/17  2:10 PM   Result Value Ref Range    Glucose, Post Dose 323 (H) 70 - 139 mg/dL   URINETOTAL PROTEIN 24 HR    Collection Time:  10/09/17  7:00 AM   Result Value Ref Range    Total Protein, Urine 12.0 0.0 - 15.0 mg/dL    Total Volume, Urine 2,750 mL    Total Protein, 24 Hour Urine 330.0 (H) 30.0 - 150.0 mg/24 Hr   CBC WITH DIFFERENTIAL    Collection Time: 10/12/17 11:30 AM   Result Value Ref Range    WBC 12.5 (H) 4.8 - 10.8 K/uL    RBC 4.94 4.20 - 5.40 M/uL    Hemoglobin 14.3 12.0 - 16.0 g/dL    Hematocrit 43.0 37.0 - 47.0 %    MCV 87.0 81.4 - 97.8 fL    MCH 28.9 27.0 - 33.0 pg    MCHC 33.3 (L) 33.6 - 35.0 g/dL    RDW 44.3 35.9 - 50.0 fL    Platelet Count 300 164 - 446 K/uL    MPV 10.6 9.0 - 12.9 fL    Neutrophils-Polys 67.10 44.00 - 72.00 %    Lymphocytes 24.60 22.00 - 41.00 %    Monocytes 6.50 0.00 - 13.40 %    Eosinophils 0.80 0.00 - 6.90 %    Basophils 0.60 0.00 - 1.80 %    Immature Granulocytes 0.40 0.00 - 0.90 %    Nucleated RBC 0.00 /100 WBC    Neutrophils (Absolute) 8.36 (H) 2.00 - 7.15 K/uL    Lymphs (Absolute) 3.06 1.00 - 4.80 K/uL    Monos (Absolute) 0.81 0.00 - 0.85 K/uL    Eos (Absolute) 0.10 0.00 - 0.51 K/uL    Baso (Absolute) 0.07 0.00 - 0.12 K/uL    Immature Granulocytes (abs) 0.05 0.00 - 0.11 K/uL    NRBC (Absolute) 0.00 K/uL   COMP METABOLIC PANEL    Collection Time: 10/12/17 11:30 AM   Result Value Ref Range    Sodium 133 (L) 135 - 145 mmol/L    Potassium 4.4 3.6 - 5.5 mmol/L    Chloride 105 96 - 112 mmol/L    Co2 18 (L) 20 - 33 mmol/L    Anion Gap 10.0 0.0 - 11.9    Glucose 139 (H) 65 - 99 mg/dL    Bun 15 8 - 22 mg/dL    Creatinine 0.79 0.50 - 1.40 mg/dL    Calcium 9.0 8.5 - 10.5 mg/dL    AST(SGOT) 56 (H) 12 - 45 U/L    ALT(SGPT) 28 2 - 50 U/L    Alkaline Phosphatase 107 (H) 30 - 99 U/L    Total Bilirubin 0.4 0.1 - 1.5 mg/dL    Albumin 3.0 (L) 3.2 - 4.9 g/dL    Total Protein 6.3 6.0 - 8.2 g/dL    Globulin 3.3 1.9 - 3.5 g/dL    A-G Ratio 0.9 g/dL   URIC ACID    Collection Time: 10/12/17 11:30 AM   Result Value Ref Range    Uric Acid 6.0 1.9 - 8.2 mg/dL   HEMOGLOBIN A1C    Collection Time: 10/12/17 11:30 AM   Result Value  Ref Range    Glycohemoglobin 9.8 (H) 0.0 - 5.6 %    Est Avg Glucose 235 mg/dL   ESTIMATED GFR    Collection Time: 10/12/17 11:30 AM   Result Value Ref Range    GFR If African American >60 >60 mL/min/1.73 m 2    GFR If Non African American >60 >60 mL/min/1.73 m 2   POC UA    Collection Time: 10/12/17 12:33 PM   Result Value Ref Range    POC Color Yellow     POC Appearance Clear     POC Glucose Negative Negative mg/dL    POC Ketones Negative Negative mg/dL    POC Specific Gravity <=1.005 (A) 1.005 - 1.030    POC Blood Negative Negative    POC Urine PH 6.0 5.0 - 8.0    POC Protein Negative Negative mg/dL    POC Nitrites Negative Negative    POC Leukocyte Esterase Small (A) Negative   ACCU-CHEK GLUCOSE    Collection Time: 10/12/17  1:11 PM   Result Value Ref Range    Glucose - Accu-Ck 111 (H) 65 - 99 mg/dL   ACCU-CHEK GLUCOSE    Collection Time: 10/12/17  3:44 PM   Result Value Ref Range    Glucose - Accu-Ck 111 (H) 65 - 99 mg/dL   POCT Urinalysis    Collection Time: 10/13/17  9:16 AM   Result Value Ref Range    POC Color  Negative    POC Appearance  Negative    POC Leukocyte Esterase neg Negative    POC Nitrites neg Negative    POC Urobiligen  Negative (0.2) mg/dL    POC Protein trace Negative mg/dL    POC Urine PH 6.0 5.0 - 8.0    POC Blood neg Negative    POC Specific Gravity 1.020 <1.005 - >1.030    POC Ketones trace Negative mg/dL    POC Biliruben  Negative mg/dL    POC Glucose neg Negative mg/dL   POCT Urinalysis    Collection Time: 10/26/17  8:40 AM   Result Value Ref Range    POC Color  Negative    POC Appearance  Negative    POC Leukocyte Esterase neg Negative    POC Nitrites neg Negative    POC Urobiligen  Negative (0.2) mg/dL    POC Protein 100 Negative mg/dL    POC Urine PH 6.5 5.0 - 8.0    POC Blood neg Negative    POC Specific Gravity 1.010 <1.005 - >1.030    POC Ketones neg Negative mg/dL    POC Biliruben  Negative mg/dL    POC Glucose neg Negative mg/dL   POC UA    Collection Time: 10/26/17 11:14 AM    Result Value Ref Range    POC Color Yellow     POC Appearance Cloudy (A)     POC Glucose Negative Negative mg/dL    POC Ketones Negative Negative mg/dL    POC Specific Gravity 1.010 1.005 - 1.030    POC Blood Negative Negative    POC Urine PH 6.5 5.0 - 8.0    POC Protein 100 (A) Negative mg/dL    POC Nitrites Negative Negative    POC Leukocyte Esterase Small (A) Negative   POC UA    Collection Time: 10/26/17 11:23 AM   Result Value Ref Range    POC Color Yellow     POC Appearance Slightly Cloudy (A)     POC Glucose Negative Negative mg/dL    POC Ketones Negative Negative mg/dL    POC Specific Gravity <=1.005 (A) 1.005 - 1.030    POC Blood Trace-intact (A) Negative    POC Urine PH 6.5 5.0 - 8.0    POC Protein 100 (A) Negative mg/dL    POC Nitrites Negative Negative    POC Leukocyte Esterase Small (A) Negative   CBC WITH DIFFERENTIAL    Collection Time: 10/26/17 11:58 AM   Result Value Ref Range    WBC 9.9 4.8 - 10.8 K/uL    RBC 4.96 4.20 - 5.40 M/uL    Hemoglobin 14.6 12.0 - 16.0 g/dL    Hematocrit 43.8 37.0 - 47.0 %    MCV 88.3 81.4 - 97.8 fL    MCH 29.4 27.0 - 33.0 pg    MCHC 33.3 (L) 33.6 - 35.0 g/dL    RDW 45.0 35.9 - 50.0 fL    Platelet Count 290 164 - 446 K/uL    MPV 10.7 9.0 - 12.9 fL    Neutrophils-Polys 63.10 44.00 - 72.00 %    Lymphocytes 29.00 22.00 - 41.00 %    Monocytes 5.90 0.00 - 13.40 %    Eosinophils 0.90 0.00 - 6.90 %    Basophils 0.70 0.00 - 1.80 %    Immature Granulocytes 0.40 0.00 - 0.90 %    Nucleated RBC 0.00 /100 WBC    Neutrophils (Absolute) 6.25 2.00 - 7.15 K/uL    Lymphs (Absolute) 2.87 1.00 - 4.80 K/uL    Monos (Absolute) 0.58 0.00 - 0.85 K/uL    Eos (Absolute) 0.09 0.00 - 0.51 K/uL    Baso (Absolute) 0.07 0.00 - 0.12 K/uL    Immature Granulocytes (abs) 0.04 0.00 - 0.11 K/uL    NRBC (Absolute) 0.00 K/uL   COMP METABOLIC PANEL    Collection Time: 10/26/17 11:58 AM   Result Value Ref Range    Sodium 135 135 - 145 mmol/L    Potassium 4.8 3.6 - 5.5 mmol/L    Chloride 104 96 - 112 mmol/L     Co2 22 20 - 33 mmol/L    Anion Gap 9.0 0.0 - 11.9    Glucose 64 (L) 65 - 99 mg/dL    Bun 18 8 - 22 mg/dL    Creatinine 0.89 0.50 - 1.40 mg/dL    Calcium 9.1 8.5 - 10.5 mg/dL    AST(SGOT) 58 (H) 12 - 45 U/L    ALT(SGPT) 48 2 - 50 U/L    Alkaline Phosphatase 130 (H) 30 - 99 U/L    Total Bilirubin 0.4 0.1 - 1.5 mg/dL    Albumin 3.2 3.2 - 4.9 g/dL    Total Protein 6.3 6.0 - 8.2 g/dL    Globulin 3.1 1.9 - 3.5 g/dL    A-G Ratio 1.0 g/dL   URIC ACID    Collection Time: 10/26/17 11:58 AM   Result Value Ref Range    Uric Acid 7.2 1.9 - 8.2 mg/dL   ESTIMATED GFR    Collection Time: 10/26/17 11:58 AM   Result Value Ref Range    GFR If African American >60 >60 mL/min/1.73 m 2    GFR If Non African American >60 >60 mL/min/1.73 m 2   PROTEIN/CREAT RATIO URINE    Collection Time: 10/26/17  3:30 PM   Result Value Ref Range    Total Protein, Urine 44.0 (H) 0.0 - 15.0 mg/dL    Creatinine, Random Urine 46.80 mg/dL    Protein Creatinine Ratio 940 (H) 10 - 107 mg/g   POCT Urinalysis    Collection Time: 10/30/17  8:55 AM   Result Value Ref Range    POC Color  Negative    POC Appearance  Negative    POC Leukocyte Esterase Neg Negative    POC Nitrites Neh Negative    POC Urobiligen  Negative (0.2) mg/dL    POC Protein 30 Negative mg/dL    POC Urine PH 6.0 5.0 - 8.0    POC Blood Neg Negative    POC Specific Gravity 1.005 <1.005 - >1.030    POC Ketones Neg Negative mg/dL    POC Biliruben  Negative mg/dL    POC Glucose Neg Negative mg/dL   URINETOTAL PROTEIN 24 HR    Collection Time: 11/01/17  8:30 AM   Result Value Ref Range    Total Volume, Urine 2,375 mL    Total Protein, Urine 35.7 (H) 0.0 - 15.0 mg/dL    Total Protein, 24 Hour Urine 847.9 (H) 30.0 - 150.0 mg/24 Hr   PREGNANCY INDUCED HYPERTENSION PROFILE-CBC W/O, BUN, CREATININE, AST, URIC ACID    Collection Time: 11/01/17  4:13 PM   Result Value Ref Range    Bun 21 8 - 22 mg/dL    Creatinine 1.05 0.50 - 1.40 mg/dL    AST(SGOT) 42 12 - 45 U/L    Uric Acid 7.5 1.9 - 8.2 mg/dL    WBC 12.2  (H) 4.8 - 10.8 K/uL    RBC 4.92 4.20 - 5.40 M/uL    Hemoglobin 14.2 12.0 - 16.0 g/dL    Hematocrit 42.6 37.0 - 47.0 %    MCV 86.6 81.4 - 97.8 fL    MCH 28.9 27.0 - 33.0 pg    MCHC 33.3 (L) 33.6 - 35.0 g/dL    RDW 44.8 35.9 - 50.0 fL    Platelet Count 264 164 - 446 K/uL    MPV 11.4 9.0 - 12.9 fL   ESTIMATED GFR    Collection Time: 17  4:13 PM   Result Value Ref Range    GFR If African American >60 >60 mL/min/1.73 m 2    GFR If Non African American >60 >60 mL/min/1.73 m 2   POCT NST    Collection Time: 17  8:03 AM   Result Value Ref Range    NST Indications GDM/HTN     NST Baseline 130     NST Uterine Activity 2 CTXs     NST Acoustic Stimulation no     NST Assessment NR, cat 2     NST Action Necessary      NST Other Data      NST Return      NST Read By     POCT Urinalysis    Collection Time: 17  8:10 AM   Result Value Ref Range    POC Color  Negative    POC Appearance  Negative    POC Leukocyte Esterase NEGATIVE Negative    POC Nitrites NEGATIVE Negative    POC Urobiligen  Negative (0.2) mg/dL    POC Protein 2+ Negative mg/dL    POC Urine PH 6.5 5.0 - 8.0    POC Blood NEGATIVE Negative    POC Specific Gravity 1.015 <1.005 - >1.030    POC Ketones NEGATIVE Negative mg/dL    POC Biliruben  Negative mg/dL    POC Glucose NEGATIVE Negative mg/dL       A/P:  27 y.o.  at 32w0d presents for obstetric follow-up. Poorly controlled HTN. FSBS are controlled. Pt has been on 3 antihypertensive meds and BP today are still elevated. 24 hr protein is elevated to 847.9 mg of protein. Pt needs to be evaluated on L&D for BP control and steroids. Pt discussed with Dr. Bhatti and sent to L&D.

## 2017-11-02 NOTE — PROGRESS NOTES
Pt here for GDM f/u. Denies any complications today. Reports +FM.  Good phone# [phone]  Pharmacy verified with pt.  tdap today

## 2017-11-02 NOTE — H&P
History and Physical      Angelina Cox is a 27 y.o. year old female  at 32w0d who presents for from the pregnancy center today for elevated BPs  150s/100 despite take three antihypertensives (labetalol, procardia XL, and aldomet).  Denies headaches, visual changes, RUQ pain, or worsening edema.      Subjective:   negative  For CTXS.   negative Feels pain   negative for LOF  negative for vaginal bleeding.   positive for fetal movement    ROS: Pertinent items are noted in HPI.    History reviewed. No pertinent past medical history.  History reviewed. No pertinent surgical history.  OB History    Para Term  AB Living   1             SAB TAB Ectopic Molar Multiple Live Births                    # Outcome Date GA Lbr Augustus/2nd Weight Sex Delivery Anes PTL Lv   1 Current                 Social History     Social History   • Marital status:      Spouse name: N/A   • Number of children: N/A   • Years of education: N/A     Occupational History   • Not on file.     Social History Main Topics   • Smoking status: Never Smoker   • Smokeless tobacco: Never Used   • Alcohol use No   • Drug use: No   • Sexual activity: Yes     Partners: Male      Comment: none     Other Topics Concern   • Not on file     Social History Narrative   • No narrative on file     Allergies: Review of patient's allergies indicates no known allergies.    Current Facility-Administered Medications:   •  NIFEdipine SR (PROCARDIA-XL) tablet 30 mg, 30 mg, Oral, Q DAY, Torrey Drake M.D., Stopped at 17 1030  •  labetalol (NORMODYNE) tablet 200 mg, 200 mg, Oral, TWICE DAILY, Torrey Drake M.D., Stopped at 17 1030  •  methyldopa (ALDOMET) tablet 250 mg, 250 mg, Oral, TID, Torrey Drake M.D., Stopped at 17 1030  •  betamethasone acetate-betamethasone sodium phosphate (CELESTONE) injection 12 mg, 12 mg, Intramuscular, Q24HR, Torrey Drake M.D., 12 mg at 17 1101  •  labetalol (NORMODYNE,TRANDATE) injection  20-80 mg, 20-80 mg, Intravenous, PRN, 40 mg at 11/02/17 1300 **OR** hydrALAZINE (APRESOLINE) injection 5-10 mg, 5-10 mg, Intravenous, PRN, Jeanne Bhatti M.D.    Prenatal care with [facility] starting at 24 weeks with following problems:  Patient Active Problem List    Diagnosis Date Noted   • Essential hypertension 10/19/2017   • Late prenatal care 10/19/2017   • Diabetes mellitus affecting pregnancy in third trimester 10/16/2017   • Supervision of high-risk pregnancy 10/05/2017               Objective:      Blood pressure (!) 167/107, pulse 83, temperature 36.4 °C (97.6 °F), resp. rate 18, height 1.524 m (5'), weight 86.6 kg (191 lb), last menstrual period 04/20/2017.    General:   no acute distress   Skin:   normal   HEENT:  PERRLA   Lungs:   CTA bilateral   Heart:   S1, S2 normal, no murmur, click, rub or gallop, regular rate and rhythm, brisk carotid upstroke without bruits, peripheral pulses very brisk, chest is clear without rales or wheezing, no pedal edema, no JVD, no hepatosplenomegaly   Abdomen:   gravid, NT   EFW:  1800 grams   Pelvis:  adequate with gynecoid pelvis   FHT:  140s BPM, moderate variability, + accels   Uterine Size: S=D   Presentations: Unsure   Cervix: Deferred.    Dilation:     Effacement:     Station:      Consistency:     Position:      Lab Review  Lab:   Blood type: O     Recent Results (from the past 5880 hour(s))   CHLAMYDIA/GC PCR URINE OR SWAB    Collection Time: 10/05/17 10:15 AM   Result Value Ref Range    Source GENITAL     C. trachomatis by PCR Negative Negative    N. gonorrhoeae by PCR Negative Negative   PREG CNTR PRENATAL PN    Collection Time: 10/05/17  1:05 PM   Result Value Ref Range    Color Yellow     Character Cloudy (A)     Specific Gravity 1.038 <1.035    Ph 6.5 5.0 - 8.0    Glucose >=1000 (A) Negative mg/dL    Ketones Negative Negative mg/dL    Protein Negative Negative mg/dL    Bilirubin Negative Negative    Urobilinogen, Urine 0.2 Negative    Nitrite Negative Negative     Leukocyte Esterase Negative Negative    Occult Blood Negative Negative    Micro Urine Req Microscopic     Rubella IgG Antibody 24.30 IU/mL    Syphilis, Treponemal Qual Non Reactive Non Reactive    Hepatitis B Surface Antigen Negative Negative    WBC 13.9 (H) 4.8 - 10.8 K/uL    RBC 5.16 4.20 - 5.40 M/uL    Hemoglobin 15.1 12.0 - 16.0 g/dL    Hematocrit 45.4 37.0 - 47.0 %    MCV 88.0 81.4 - 97.8 fL    MCH 29.3 27.0 - 33.0 pg    MCHC 33.3 (L) 33.6 - 35.0 g/dL    RDW 43.7 35.9 - 50.0 fL    Platelet Count 318 164 - 446 K/uL    MPV 11.2 9.0 - 12.9 fL    Neutrophils-Polys 66.30 44.00 - 72.00 %    Lymphocytes 25.30 22.00 - 41.00 %    Monocytes 6.50 0.00 - 13.40 %    Eosinophils 0.90 0.00 - 6.90 %    Basophils 0.60 0.00 - 1.80 %    Immature Granulocytes 0.40 0.00 - 0.90 %    Nucleated RBC 0.00 /100 WBC    Neutrophils (Absolute) 9.22 (H) 2.00 - 7.15 K/uL    Lymphs (Absolute) 3.52 1.00 - 4.80 K/uL    Monos (Absolute) 0.90 (H) 0.00 - 0.85 K/uL    Eos (Absolute) 0.12 0.00 - 0.51 K/uL    Baso (Absolute) 0.08 0.00 - 0.12 K/uL    Immature Granulocytes (abs) 0.05 0.00 - 0.11 K/uL    NRBC (Absolute) 0.00 K/uL    Culture Indicated Yes UA Culture   COMP METABOLIC PANEL    Collection Time: 10/05/17  1:05 PM   Result Value Ref Range    Sodium 135 135 - 145 mmol/L    Potassium 4.4 3.6 - 5.5 mmol/L    Chloride 102 96 - 112 mmol/L    Co2 22 20 - 33 mmol/L    Anion Gap 11.0 0.0 - 11.9    Glucose 219 (H) 65 - 99 mg/dL    Bun 13 8 - 22 mg/dL    Creatinine 0.78 0.50 - 1.40 mg/dL    Calcium 9.1 8.5 - 10.5 mg/dL    AST(SGOT) 41 12 - 45 U/L    ALT(SGPT) 28 2 - 50 U/L    Alkaline Phosphatase 115 (H) 30 - 99 U/L    Total Bilirubin 0.3 0.1 - 1.5 mg/dL    Albumin 3.2 3.2 - 4.9 g/dL    Total Protein 6.6 6.0 - 8.2 g/dL    Globulin 3.4 1.9 - 3.5 g/dL    A-G Ratio 0.9 g/dL   URIC ACID    Collection Time: 10/05/17  1:05 PM   Result Value Ref Range    Uric Acid 4.8 1.9 - 8.2 mg/dL   HIV ANTIBODIES    Collection Time: 10/05/17  1:05 PM   Result Value Ref  Range    HIV Ag/Ab Combo Assay Non Reactive Non Reactive   CHLAMYDIA/GC PCR URINE OR SWAB    Collection Time: 10/05/17  1:05 PM   Result Value Ref Range    Source Urine     C. trachomatis by PCR Negative Negative    N. gonorrhoeae by PCR Negative Negative   OP PRENATAL PANEL-BLOOD BANK    Collection Time: 10/05/17  1:05 PM   Result Value Ref Range    ABO Grouping Only O     Rh Grouping Only POS     Antibody Screen Scrn NEG    URINE MICROSCOPIC (W/UA)    Collection Time: 10/05/17  1:05 PM   Result Value Ref Range    WBC 5-10 (A) /hpf    RBC 2-5 (A) /hpf    Bacteria Moderate (A) None /hpf    Epithelial Cells Moderate (A) /hpf    Hyaline Cast 0-2 /lpf   ESTIMATED GFR    Collection Time: 10/05/17  1:05 PM   Result Value Ref Range    GFR If African American >60 >60 mL/min/1.73 m 2    GFR If Non African American >60 >60 mL/min/1.73 m 2   URINE CULTURE(NEW)    Collection Time: 10/05/17  1:05 PM   Result Value Ref Range    Significant Indicator NEG     Source UR     Urine Culture Mixed skin ap >100,000 cfu/mL    GLUCOSE 1HR GESTATIONAL    Collection Time: 10/05/17  2:10 PM   Result Value Ref Range    Glucose, Post Dose 323 (H) 70 - 139 mg/dL   URINETOTAL PROTEIN 24 HR    Collection Time: 10/09/17  7:00 AM   Result Value Ref Range    Total Protein, Urine 12.0 0.0 - 15.0 mg/dL    Total Volume, Urine 2,750 mL    Total Protein, 24 Hour Urine 330.0 (H) 30.0 - 150.0 mg/24 Hr   CBC WITH DIFFERENTIAL    Collection Time: 10/12/17 11:30 AM   Result Value Ref Range    WBC 12.5 (H) 4.8 - 10.8 K/uL    RBC 4.94 4.20 - 5.40 M/uL    Hemoglobin 14.3 12.0 - 16.0 g/dL    Hematocrit 43.0 37.0 - 47.0 %    MCV 87.0 81.4 - 97.8 fL    MCH 28.9 27.0 - 33.0 pg    MCHC 33.3 (L) 33.6 - 35.0 g/dL    RDW 44.3 35.9 - 50.0 fL    Platelet Count 300 164 - 446 K/uL    MPV 10.6 9.0 - 12.9 fL    Neutrophils-Polys 67.10 44.00 - 72.00 %    Lymphocytes 24.60 22.00 - 41.00 %    Monocytes 6.50 0.00 - 13.40 %    Eosinophils 0.80 0.00 - 6.90 %    Basophils 0.60  0.00 - 1.80 %    Immature Granulocytes 0.40 0.00 - 0.90 %    Nucleated RBC 0.00 /100 WBC    Neutrophils (Absolute) 8.36 (H) 2.00 - 7.15 K/uL    Lymphs (Absolute) 3.06 1.00 - 4.80 K/uL    Monos (Absolute) 0.81 0.00 - 0.85 K/uL    Eos (Absolute) 0.10 0.00 - 0.51 K/uL    Baso (Absolute) 0.07 0.00 - 0.12 K/uL    Immature Granulocytes (abs) 0.05 0.00 - 0.11 K/uL    NRBC (Absolute) 0.00 K/uL   COMP METABOLIC PANEL    Collection Time: 10/12/17 11:30 AM   Result Value Ref Range    Sodium 133 (L) 135 - 145 mmol/L    Potassium 4.4 3.6 - 5.5 mmol/L    Chloride 105 96 - 112 mmol/L    Co2 18 (L) 20 - 33 mmol/L    Anion Gap 10.0 0.0 - 11.9    Glucose 139 (H) 65 - 99 mg/dL    Bun 15 8 - 22 mg/dL    Creatinine 0.79 0.50 - 1.40 mg/dL    Calcium 9.0 8.5 - 10.5 mg/dL    AST(SGOT) 56 (H) 12 - 45 U/L    ALT(SGPT) 28 2 - 50 U/L    Alkaline Phosphatase 107 (H) 30 - 99 U/L    Total Bilirubin 0.4 0.1 - 1.5 mg/dL    Albumin 3.0 (L) 3.2 - 4.9 g/dL    Total Protein 6.3 6.0 - 8.2 g/dL    Globulin 3.3 1.9 - 3.5 g/dL    A-G Ratio 0.9 g/dL   URIC ACID    Collection Time: 10/12/17 11:30 AM   Result Value Ref Range    Uric Acid 6.0 1.9 - 8.2 mg/dL   HEMOGLOBIN A1C    Collection Time: 10/12/17 11:30 AM   Result Value Ref Range    Glycohemoglobin 9.8 (H) 0.0 - 5.6 %    Est Avg Glucose 235 mg/dL   ESTIMATED GFR    Collection Time: 10/12/17 11:30 AM   Result Value Ref Range    GFR If African American >60 >60 mL/min/1.73 m 2    GFR If Non African American >60 >60 mL/min/1.73 m 2   POC UA    Collection Time: 10/12/17 12:33 PM   Result Value Ref Range    POC Color Yellow     POC Appearance Clear     POC Glucose Negative Negative mg/dL    POC Ketones Negative Negative mg/dL    POC Specific Gravity <=1.005 (A) 1.005 - 1.030    POC Blood Negative Negative    POC Urine PH 6.0 5.0 - 8.0    POC Protein Negative Negative mg/dL    POC Nitrites Negative Negative    POC Leukocyte Esterase Small (A) Negative   ACCU-CHEK GLUCOSE    Collection Time: 10/12/17  1:11 PM    Result Value Ref Range    Glucose - Accu-Ck 111 (H) 65 - 99 mg/dL   ACCU-CHEK GLUCOSE    Collection Time: 10/12/17  3:44 PM   Result Value Ref Range    Glucose - Accu-Ck 111 (H) 65 - 99 mg/dL   POCT Urinalysis    Collection Time: 10/13/17  9:16 AM   Result Value Ref Range    POC Color  Negative    POC Appearance  Negative    POC Leukocyte Esterase neg Negative    POC Nitrites neg Negative    POC Urobiligen  Negative (0.2) mg/dL    POC Protein trace Negative mg/dL    POC Urine PH 6.0 5.0 - 8.0    POC Blood neg Negative    POC Specific Gravity 1.020 <1.005 - >1.030    POC Ketones trace Negative mg/dL    POC Biliruben  Negative mg/dL    POC Glucose neg Negative mg/dL   POCT Urinalysis    Collection Time: 10/26/17  8:40 AM   Result Value Ref Range    POC Color  Negative    POC Appearance  Negative    POC Leukocyte Esterase neg Negative    POC Nitrites neg Negative    POC Urobiligen  Negative (0.2) mg/dL    POC Protein 100 Negative mg/dL    POC Urine PH 6.5 5.0 - 8.0    POC Blood neg Negative    POC Specific Gravity 1.010 <1.005 - >1.030    POC Ketones neg Negative mg/dL    POC Biliruben  Negative mg/dL    POC Glucose neg Negative mg/dL   POC UA    Collection Time: 10/26/17 11:14 AM   Result Value Ref Range    POC Color Yellow     POC Appearance Cloudy (A)     POC Glucose Negative Negative mg/dL    POC Ketones Negative Negative mg/dL    POC Specific Gravity 1.010 1.005 - 1.030    POC Blood Negative Negative    POC Urine PH 6.5 5.0 - 8.0    POC Protein 100 (A) Negative mg/dL    POC Nitrites Negative Negative    POC Leukocyte Esterase Small (A) Negative   POC UA    Collection Time: 10/26/17 11:23 AM   Result Value Ref Range    POC Color Yellow     POC Appearance Slightly Cloudy (A)     POC Glucose Negative Negative mg/dL    POC Ketones Negative Negative mg/dL    POC Specific Gravity <=1.005 (A) 1.005 - 1.030    POC Blood Trace-intact (A) Negative    POC Urine PH 6.5 5.0 - 8.0    POC Protein 100 (A) Negative mg/dL    POC  Nitrites Negative Negative    POC Leukocyte Esterase Small (A) Negative   CBC WITH DIFFERENTIAL    Collection Time: 10/26/17 11:58 AM   Result Value Ref Range    WBC 9.9 4.8 - 10.8 K/uL    RBC 4.96 4.20 - 5.40 M/uL    Hemoglobin 14.6 12.0 - 16.0 g/dL    Hematocrit 43.8 37.0 - 47.0 %    MCV 88.3 81.4 - 97.8 fL    MCH 29.4 27.0 - 33.0 pg    MCHC 33.3 (L) 33.6 - 35.0 g/dL    RDW 45.0 35.9 - 50.0 fL    Platelet Count 290 164 - 446 K/uL    MPV 10.7 9.0 - 12.9 fL    Neutrophils-Polys 63.10 44.00 - 72.00 %    Lymphocytes 29.00 22.00 - 41.00 %    Monocytes 5.90 0.00 - 13.40 %    Eosinophils 0.90 0.00 - 6.90 %    Basophils 0.70 0.00 - 1.80 %    Immature Granulocytes 0.40 0.00 - 0.90 %    Nucleated RBC 0.00 /100 WBC    Neutrophils (Absolute) 6.25 2.00 - 7.15 K/uL    Lymphs (Absolute) 2.87 1.00 - 4.80 K/uL    Monos (Absolute) 0.58 0.00 - 0.85 K/uL    Eos (Absolute) 0.09 0.00 - 0.51 K/uL    Baso (Absolute) 0.07 0.00 - 0.12 K/uL    Immature Granulocytes (abs) 0.04 0.00 - 0.11 K/uL    NRBC (Absolute) 0.00 K/uL   COMP METABOLIC PANEL    Collection Time: 10/26/17 11:58 AM   Result Value Ref Range    Sodium 135 135 - 145 mmol/L    Potassium 4.8 3.6 - 5.5 mmol/L    Chloride 104 96 - 112 mmol/L    Co2 22 20 - 33 mmol/L    Anion Gap 9.0 0.0 - 11.9    Glucose 64 (L) 65 - 99 mg/dL    Bun 18 8 - 22 mg/dL    Creatinine 0.89 0.50 - 1.40 mg/dL    Calcium 9.1 8.5 - 10.5 mg/dL    AST(SGOT) 58 (H) 12 - 45 U/L    ALT(SGPT) 48 2 - 50 U/L    Alkaline Phosphatase 130 (H) 30 - 99 U/L    Total Bilirubin 0.4 0.1 - 1.5 mg/dL    Albumin 3.2 3.2 - 4.9 g/dL    Total Protein 6.3 6.0 - 8.2 g/dL    Globulin 3.1 1.9 - 3.5 g/dL    A-G Ratio 1.0 g/dL   URIC ACID    Collection Time: 10/26/17 11:58 AM   Result Value Ref Range    Uric Acid 7.2 1.9 - 8.2 mg/dL   ESTIMATED GFR    Collection Time: 10/26/17 11:58 AM   Result Value Ref Range    GFR If African American >60 >60 mL/min/1.73 m 2    GFR If Non African American >60 >60 mL/min/1.73 m 2   PROTEIN/CREAT RATIO  URINE    Collection Time: 10/26/17  3:30 PM   Result Value Ref Range    Total Protein, Urine 44.0 (H) 0.0 - 15.0 mg/dL    Creatinine, Random Urine 46.80 mg/dL    Protein Creatinine Ratio 940 (H) 10 - 107 mg/g   POCT Urinalysis    Collection Time: 10/30/17  8:55 AM   Result Value Ref Range    POC Color  Negative    POC Appearance  Negative    POC Leukocyte Esterase Neg Negative    POC Nitrites Neh Negative    POC Urobiligen  Negative (0.2) mg/dL    POC Protein 30 Negative mg/dL    POC Urine PH 6.0 5.0 - 8.0    POC Blood Neg Negative    POC Specific Gravity 1.005 <1.005 - >1.030    POC Ketones Neg Negative mg/dL    POC Biliruben  Negative mg/dL    POC Glucose Neg Negative mg/dL   URINETOTAL PROTEIN 24 HR    Collection Time: 11/01/17  8:30 AM   Result Value Ref Range    Total Volume, Urine 2,375 mL    Total Protein, Urine 35.7 (H) 0.0 - 15.0 mg/dL    Total Protein, 24 Hour Urine 847.9 (H) 30.0 - 150.0 mg/24 Hr   PREGNANCY INDUCED HYPERTENSION PROFILE-CBC W/O, BUN, CREATININE, AST, URIC ACID    Collection Time: 11/01/17  4:13 PM   Result Value Ref Range    Bun 21 8 - 22 mg/dL    Creatinine 1.05 0.50 - 1.40 mg/dL    AST(SGOT) 42 12 - 45 U/L    Uric Acid 7.5 1.9 - 8.2 mg/dL    WBC 12.2 (H) 4.8 - 10.8 K/uL    RBC 4.92 4.20 - 5.40 M/uL    Hemoglobin 14.2 12.0 - 16.0 g/dL    Hematocrit 42.6 37.0 - 47.0 %    MCV 86.6 81.4 - 97.8 fL    MCH 28.9 27.0 - 33.0 pg    MCHC 33.3 (L) 33.6 - 35.0 g/dL    RDW 44.8 35.9 - 50.0 fL    Platelet Count 264 164 - 446 K/uL    MPV 11.4 9.0 - 12.9 fL   ESTIMATED GFR    Collection Time: 11/01/17  4:13 PM   Result Value Ref Range    GFR If African American >60 >60 mL/min/1.73 m 2    GFR If Non African American >60 >60 mL/min/1.73 m 2   POCT NST    Collection Time: 11/02/17  8:03 AM   Result Value Ref Range    NST Indications GDM/HTN     NST Baseline 130     NST Uterine Activity 2 CTXs     NST Acoustic Stimulation no     NST Assessment NR, cat 2     NST Action Necessary      NST Other Data      NST  Return      NST Read By     POCT Urinalysis    Collection Time: 11/02/17  8:10 AM   Result Value Ref Range    POC Color  Negative    POC Appearance  Negative    POC Leukocyte Esterase NEGATIVE Negative    POC Nitrites NEGATIVE Negative    POC Urobiligen  Negative (0.2) mg/dL    POC Protein 2+ Negative mg/dL    POC Urine PH 6.5 5.0 - 8.0    POC Blood NEGATIVE Negative    POC Specific Gravity 1.015 <1.005 - >1.030    POC Ketones NEGATIVE Negative mg/dL    POC Biliruben  Negative mg/dL    POC Glucose NEGATIVE Negative mg/dL   ACCU-CHEK GLUCOSE    Collection Time: 11/02/17 10:54 AM   Result Value Ref Range    Glucose - Accu-Ck 65 65 - 99 mg/dL   COMP METABOLIC PANEL    Collection Time: 11/02/17 11:44 AM   Result Value Ref Range    Sodium 135 135 - 145 mmol/L    Potassium 4.2 3.6 - 5.5 mmol/L    Chloride 106 96 - 112 mmol/L    Co2 20 20 - 33 mmol/L    Anion Gap 9.0 0.0 - 11.9    Glucose 62 (L) 65 - 99 mg/dL    Bun 19 8 - 22 mg/dL    Creatinine 0.85 0.50 - 1.40 mg/dL    Calcium 9.0 8.5 - 10.5 mg/dL    AST(SGOT) 42 12 - 45 U/L    ALT(SGPT) 37 2 - 50 U/L    Alkaline Phosphatase 130 (H) 30 - 99 U/L    Total Bilirubin 0.3 0.1 - 1.5 mg/dL    Albumin 3.0 (L) 3.2 - 4.9 g/dL    Total Protein 6.3 6.0 - 8.2 g/dL    Globulin 3.3 1.9 - 3.5 g/dL    A-G Ratio 0.9 g/dL   URIC ACID    Collection Time: 11/02/17 11:44 AM   Result Value Ref Range    Uric Acid 7.4 1.9 - 8.2 mg/dL   CBC WITH DIFFERENTIAL    Collection Time: 11/02/17 11:44 AM   Result Value Ref Range    WBC 11.4 (H) 4.8 - 10.8 K/uL    RBC 5.03 4.20 - 5.40 M/uL    Hemoglobin 15.0 12.0 - 16.0 g/dL    Hematocrit 44.2 37.0 - 47.0 %    MCV 87.9 81.4 - 97.8 fL    MCH 29.8 27.0 - 33.0 pg    MCHC 33.9 33.6 - 35.0 g/dL    RDW 45.7 35.9 - 50.0 fL    Platelet Count 270 164 - 446 K/uL    MPV 11.1 9.0 - 12.9 fL    Neutrophils-Polys 59.40 44.00 - 72.00 %    Lymphocytes 32.60 22.00 - 41.00 %    Monocytes 6.00 0.00 - 13.40 %    Eosinophils 0.90 0.00 - 6.90 %    Basophils 0.60 0.00 - 1.80 %     Immature Granulocytes 0.50 0.00 - 0.90 %    Nucleated RBC 0.00 /100 WBC    Neutrophils (Absolute) 6.75 2.00 - 7.15 K/uL    Lymphs (Absolute) 3.71 1.00 - 4.80 K/uL    Monos (Absolute) 0.68 0.00 - 0.85 K/uL    Eos (Absolute) 0.10 0.00 - 0.51 K/uL    Baso (Absolute) 0.07 0.00 - 0.12 K/uL    Immature Granulocytes (abs) 0.06 0.00 - 0.11 K/uL    NRBC (Absolute) 0.00 K/uL   ESTIMATED GFR    Collection Time: 11/02/17 11:44 AM   Result Value Ref Range    GFR If African American >60 >60 mL/min/1.73 m 2    GFR If Non African American >60 >60 mL/min/1.73 m 2        Assessment:   Angelina HeathLo at 32w0d with uncontrolled BPs despite 3 antihypertensive medications.  First visit was at 24 weeks, and pressures were elevated.  She reports no known hx of htn, but has not had any medical care.  Suspect she is a chronic hypertensive, with possible superimposed pre-eclampsia.    Also with likely undiagnosed class B diabetes.  Hemoglobin A1c 9.8 at 30 weeks.  On 10 of NPH in am, 5 reg in am, 5 regular at dinner, and 10 NPH at bedtime.    Labor status: Not in labor.          Obstetrical history significant for   Patient Active Problem List    Diagnosis Date Noted   • Essential hypertension 10/19/2017   • Late prenatal care 10/19/2017   • Diabetes mellitus affecting pregnancy in third trimester 10/16/2017   • Supervision of high-risk pregnancy 10/05/2017   .      Plan:     Admit to L&D  GBS unknown.  Will obtain today.  [facility]  PI labs  US for growth, fluid, and position  Antihypertensive protocol.    Continuous monitoring  If unable to control BPs on PO meds, will move towards delivery after steroid window complete.  NPO for now.

## 2017-11-02 NOTE — PROGRESS NOTES
Received report and assumed care of patient from MYNOR Moreno. Patient was placed on the monitor and vitals taken. Dr. Bhatti bedsode to speak with patient and  about POC. Patient will be started on the hypertensive protocol. Orders to check glucose now and to keep NPO except sips of water.   1400 Dr. Bhatti notified of blood sugars and recent BP's. Orders to begin D5LR at 125 due to low glucose levels and not to do any more checks unless patient is symptomatic.   1900 Report given to ALICIA Hagen and assumed care of patient.

## 2017-11-03 LAB
GLUCOSE BLD-MCNC: 122 MG/DL (ref 65–99)
GLUCOSE BLD-MCNC: 142 MG/DL (ref 65–99)
GLUCOSE BLD-MCNC: 176 MG/DL (ref 65–99)
GLUCOSE BLD-MCNC: 230 MG/DL (ref 65–99)
GP B STREP DNA SPEC QL NAA+PROBE: POSITIVE

## 2017-11-03 PROCEDURE — 700111 HCHG RX REV CODE 636 W/ 250 OVERRIDE (IP): Performed by: STUDENT IN AN ORGANIZED HEALTH CARE EDUCATION/TRAINING PROGRAM

## 2017-11-03 PROCEDURE — 700102 HCHG RX REV CODE 250 W/ 637 OVERRIDE(OP): Performed by: STUDENT IN AN ORGANIZED HEALTH CARE EDUCATION/TRAINING PROGRAM

## 2017-11-03 PROCEDURE — 302790 HCHG STAT ANTEPARTUM CARE, DAILY

## 2017-11-03 PROCEDURE — 770002 HCHG ROOM/CARE - OB PRIVATE (112)

## 2017-11-03 PROCEDURE — A9270 NON-COVERED ITEM OR SERVICE: HCPCS | Performed by: STUDENT IN AN ORGANIZED HEALTH CARE EDUCATION/TRAINING PROGRAM

## 2017-11-03 PROCEDURE — 700105 HCHG RX REV CODE 258: Performed by: OBSTETRICS & GYNECOLOGY

## 2017-11-03 PROCEDURE — 82962 GLUCOSE BLOOD TEST: CPT

## 2017-11-03 RX ORDER — DEXTROSE, SODIUM CHLORIDE, SODIUM LACTATE, POTASSIUM CHLORIDE, AND CALCIUM CHLORIDE 5; .6; .31; .03; .02 G/100ML; G/100ML; G/100ML; G/100ML; G/100ML
INJECTION, SOLUTION INTRAVENOUS CONTINUOUS
Status: DISCONTINUED | OUTPATIENT
Start: 2017-11-03 | End: 2017-11-04

## 2017-11-03 RX ORDER — SODIUM CHLORIDE, SODIUM LACTATE, POTASSIUM CHLORIDE, CALCIUM CHLORIDE 600; 310; 30; 20 MG/100ML; MG/100ML; MG/100ML; MG/100ML
INJECTION, SOLUTION INTRAVENOUS CONTINUOUS
Status: DISCONTINUED | OUTPATIENT
Start: 2017-11-03 | End: 2017-11-04

## 2017-11-03 RX ADMIN — METHYLDOPA 250 MG: 250 TABLET ORAL at 21:59

## 2017-11-03 RX ADMIN — METHYLDOPA 250 MG: 250 TABLET ORAL at 08:52

## 2017-11-03 RX ADMIN — INSULIN HUMAN 10 UNITS: 100 INJECTION, SUSPENSION SUBCUTANEOUS at 21:59

## 2017-11-03 RX ADMIN — SODIUM CHLORIDE, SODIUM LACTATE, POTASSIUM CHLORIDE, CALCIUM CHLORIDE AND DEXTROSE MONOHYDRATE: 5; 600; 310; 30; 20 INJECTION, SOLUTION INTRAVENOUS at 00:14

## 2017-11-03 RX ADMIN — NIFEDIPINE 30 MG: 30 TABLET, FILM COATED, EXTENDED RELEASE ORAL at 08:52

## 2017-11-03 RX ADMIN — SODIUM CHLORIDE, POTASSIUM CHLORIDE, SODIUM LACTATE AND CALCIUM CHLORIDE: 600; 310; 30; 20 INJECTION, SOLUTION INTRAVENOUS at 18:27

## 2017-11-03 RX ADMIN — LABETALOL HYDROCHLORIDE 200 MG: 100 TABLET, FILM COATED ORAL at 08:52

## 2017-11-03 RX ADMIN — SODIUM CHLORIDE, SODIUM LACTATE, POTASSIUM CHLORIDE, CALCIUM CHLORIDE AND DEXTROSE MONOHYDRATE: 5; 600; 310; 30; 20 INJECTION, SOLUTION INTRAVENOUS at 08:21

## 2017-11-03 RX ADMIN — BETAMETHASONE SODIUM PHOSPHATE AND BETAMETHASONE ACETATE 12 MG: 3; 3 INJECTION, SUSPENSION INTRA-ARTICULAR; INTRALESIONAL; INTRAMUSCULAR at 11:01

## 2017-11-03 RX ADMIN — METHYLDOPA 250 MG: 250 TABLET ORAL at 15:30

## 2017-11-03 RX ADMIN — SODIUM CHLORIDE, SODIUM LACTATE, POTASSIUM CHLORIDE, CALCIUM CHLORIDE AND DEXTROSE MONOHYDRATE: 5; 600; 310; 30; 20 INJECTION, SOLUTION INTRAVENOUS at 16:35

## 2017-11-03 RX ADMIN — LABETALOL HYDROCHLORIDE 200 MG: 100 TABLET, FILM COATED ORAL at 21:58

## 2017-11-03 RX ADMIN — INSULIN HUMAN 5 UNITS: 100 INJECTION, SOLUTION PARENTERAL at 16:49

## 2017-11-03 ASSESSMENT — PATIENT HEALTH QUESTIONNAIRE - PHQ9
1. LITTLE INTEREST OR PLEASURE IN DOING THINGS: NOT AT ALL
SUM OF ALL RESPONSES TO PHQ QUESTIONS 1-9: 0
SUM OF ALL RESPONSES TO PHQ9 QUESTIONS 1 AND 2: 0
2. FEELING DOWN, DEPRESSED, IRRITABLE, OR HOPELESS: NOT AT ALL

## 2017-11-03 ASSESSMENT — PAIN SCALES - GENERAL: PAINLEVEL_OUTOF10: 0

## 2017-11-03 ASSESSMENT — COPD QUESTIONNAIRES
HAVE YOU SMOKED AT LEAST 100 CIGARETTES IN YOUR ENTIRE LIFE: NO/DON'T KNOW
COPD SCREENING SCORE: 0
DO YOU EVER COUGH UP ANY MUCUS OR PHLEGM?: NO/ONLY WITH OCCASIONAL COLDS OR INFECTIONS
DURING THE PAST 4 WEEKS HOW MUCH DID YOU FEEL SHORT OF BREATH: NONE/LITTLE OF THE TIME

## 2017-11-03 NOTE — PROGRESS NOTES
0700  Report received from Liliane GRANT.  Pt resting in bed, states no pain at this time, POC discussed, all questions answered, Pt states understanding.  1335  Dr Noble given update regarding Pt's FSBS, new orders received  1430  E [facility] RN at bedside to discuss POC with Pt  1815  Dr Noble given update regarding FSBS, orders received to change IV fluids  1900  Report given to Ally GRANT

## 2017-11-03 NOTE — PROGRESS NOTES
Progress Note  27 y.o.  female , with dx of Chronic HTN, GDM  9 agree likely Class B DM ) , with late entry, and has been on meds  For BP, and insulin.   Now Triple meds : aldomet  250 mg TID                                Labetalol 200 mg BID                                 Nifedipine XL  30 mg   Patient admitted secondary to worsening BP required Anti -HTN protocol , U/S showed intermittent lost of Diastolic flow . BMZ started   First dose 2017 at 1100, 2nd dose 11/3 at 1100 , Surgery planned for   Subjective: Patient with out Headache     Objective Data      Vitals:    17 0725 17 0800 17 0900 17 1000   BP: 143/89  155/91 157/91   Pulse: 69  75 75   Resp:  17     Temp: 36.9 °C (98.4 °F) 36.5 °C (97.7 °F)     Weight:       Height:        , Positive Variability       Intake/Output Summary (Last 24 hours) at 17 1021  Last data filed at 17 1700   Gross per 24 hour   Intake              500 ml   Output                0 ml   Net              500 ml       Current Facility-Administered Medications   Medication Dose Route Frequency Provider Last Rate Last Dose   • D5LR infusion   Intravenous Continuous Jeanne Bhatti M.D. 125 mL/hr at 17 0821     • NIFEdipine SR (PROCARDIA-XL) tablet 30 mg  30 mg Oral Q DAY Torrey Drake M.D.   30 mg at 17 0852   • labetalol (NORMODYNE) tablet 200 mg  200 mg Oral TWICE DAILY Torrey Drake M.D.   200 mg at 17 0852   • methyldopa (ALDOMET) tablet 250 mg  250 mg Oral TID Torrey Drake M.D.   250 mg at 17 0852   • betamethasone acetate-betamethasone sodium phosphate (CELESTONE) injection 12 mg  12 mg Intramuscular Q24HR Torrey Drake M.D.   12 mg at 17 1101   • labetalol (NORMODYNE,TRANDATE) injection 20-80 mg  20-80 mg Intravenous PRN Jeanne Bhatti M.D.   40 mg at 17 1300    Or   • hydrALAZINE (APRESOLINE) injection 5-10 mg  5-10 mg Intravenous PRN Jeanne Bhatti M.D.       • insulin NPH  (HUMULIN,NOVOLIN) injection 10 Units  10 Units Subcutaneous QAM AC Jeanne Bhatti M.D.   10 Units at 11/03/17 0720   • insulin regular (HUMULIN R) injection 5 Units  5 Units Subcutaneous QAM AC Jeanne Bhatti M.D.   5 Units at 11/03/17 0719   • insulin regular (HUMULIN R) injection 5 Units  5 Units Subcutaneous AC PM MEAL Jeanne Bhatti M.D.   5 Units at 11/02/17 2012   • insulin NPH (HUMULIN,NOVOLIN) injection 10 Units  10 Units Subcutaneous QHS Jeanne Bhatti M.D.   10 Units at 11/02/17 2126     Recent Labs      11/01/17   1613  11/02/17   1144   SODIUM   --   135   POTASSIUM   --   4.2   CHLORIDE   --   106   CO2   --   20   GLUCOSE   --   62*   BUN  21  19   CREATININE  1.05  0.85   CALCIUM   --   9.0     Recent Labs      11/01/17   1613  11/02/17   1144   WBC  12.2*  11.4*   RBC  4.92  5.03   HEMOGLOBIN  14.2  15.0   HEMATOCRIT  42.6  44.2   MCV  86.6  87.9   MCH  28.9  29.8   MCHC  33.3*  33.9   RDW  44.8  45.7   PLATELETCT  264  270   MPV  11.4  11.1     Results for SHERIFFMACIELCONTRERAS, ANGEL (MRN [id number]) as of 11/3/2017 10:39   Ref. Range 11/2/2017 11:44   Calcium Latest Ref Range: 8.5 - 10.5 mg/dL 9.0   AST(SGOT) Latest Ref Range: 12 - 45 U/L 42   ALT(SGPT) Latest Ref Range: 2 - 50 U/L 37   Alkaline Phosphatase Latest Ref Range: 30 - 99 U/L 130 (H)   Total Bilirubin Latest Ref Range: 0.1 - 1.5 mg/dL 0.3   Albumin Latest Ref Range: 3.2 - 4.9 g/dL 3.0 (L)   Total Protein Latest Ref Range: 6.0 - 8.2 g/dL 6.3   Globulin Latest Ref Range: 1.9 - 3.5 g/dL 3.3   A-G Ratio Latest Units: g/dL 0.9   Uric Acid Latest Ref Range: 1.9 - 8.2 mg/dL 7.4     AsResults for ANGEL MCCURDY (MRN [id number]) as of 11/3/2017 11:08   Ref. Range 11/1/2017 08:30   Total Protein, Urine Latest Ref Range: 0.0 - 15.0 mg/dL 35.7 (H)   Total Volume, Urine Latest Units: mL 2,375   Total Protein, 24 Hour Urine Latest Ref Range: 30.0 - 150.0 mg/24 Hr 847.9 (H)   sessment: 32w1d                      Chronic HTN                     Class B DM                     Fetal Status appears no longer Stable , with absent End Diastolic flow   P. Discussed with patient : Delivery indicated                          Plan: Schedule Primary C/S , 11/4/2017            Hold indsulin , as patient is NPO ,            Close monitoring of blood Sugar         SSI for Insulin

## 2017-11-03 NOTE — PROGRESS NOTES
1900-rcvd report from dayshift RN and assumed care of pt.  1925-pt's dinner tray here  1937-pt had decel down into the 90's for 70 seconds.  1945-Dr. Bhatti aware of decel. Orders rcvd to make pt NPO again after she is finished with her dinner and restart D5LR at 125cc/hr.  0300-pt continues to have decels throughout night with minimal variability.  Aware. Dr. Stewart will be in to evaluate pt in am.  0633-fasting  Dr. Bhatti updated and orders rcvd to give morning insulin at 0700 then recheck BS and hour after insulin given.  0635-Dr. Stewart at bedside to complete US.  0700-report given to dayshift RN

## 2017-11-03 NOTE — CARE PLAN
Problem: Powerlessness  Goal: Patient will verbalize increased feelings of control or understanding    Intervention: Assist patient to identify situations that she does have control over  Pt encouraged to participate in POC and ask questions when needed.      Problem: Risk for injury  Goal: Patient and fetus will be free of preventable injury/complications    Intervention: Monitor Fetal well being  Will monitor pt and fetus closely throughout hospital stay. Pt on continuous monitoring.

## 2017-11-03 NOTE — DIETARY
Diabetes Education:   delivery scheduled for tomorrow, so will not be needing diabetes education.  RD available PRN or per dept policy.

## 2017-11-03 NOTE — CONSULTS
Asked to evaluated and scan by Dr Bhatti        Vtx  EFW: 1374g (<5%ile)  REGINALD 5.7cm (oligo)  Doppler of UA elevated to absent end diastolic flow  BPP 6/8    Recc:  Deliver for worsening FH tracing or at 48 hours post BMZ    Discussed with Dr Bhatti  Full report to follow

## 2017-11-03 NOTE — CONSULTS
Diabetes Education:  RN reports patient is scheduled for a  delivery tomorrow, so will not be needing diabetes education.

## 2017-11-04 PROCEDURE — 700102 HCHG RX REV CODE 250 W/ 637 OVERRIDE(OP): Performed by: ANESTHESIOLOGY

## 2017-11-04 PROCEDURE — A9270 NON-COVERED ITEM OR SERVICE: HCPCS | Performed by: OBSTETRICS & GYNECOLOGY

## 2017-11-04 PROCEDURE — A9270 NON-COVERED ITEM OR SERVICE: HCPCS | Performed by: STUDENT IN AN ORGANIZED HEALTH CARE EDUCATION/TRAINING PROGRAM

## 2017-11-04 PROCEDURE — 305385 HCHG SURGICAL SERVICES 1/4 HOUR

## 2017-11-04 PROCEDURE — 700105 HCHG RX REV CODE 258: Performed by: OBSTETRICS & GYNECOLOGY

## 2017-11-04 PROCEDURE — 770002 HCHG ROOM/CARE - OB PRIVATE (112)

## 2017-11-04 PROCEDURE — 36415 COLL VENOUS BLD VENIPUNCTURE: CPT

## 2017-11-04 PROCEDURE — 700102 HCHG RX REV CODE 250 W/ 637 OVERRIDE(OP): Performed by: OBSTETRICS & GYNECOLOGY

## 2017-11-04 PROCEDURE — 700102 HCHG RX REV CODE 250 W/ 637 OVERRIDE(OP): Performed by: STUDENT IN AN ORGANIZED HEALTH CARE EDUCATION/TRAINING PROGRAM

## 2017-11-04 PROCEDURE — 304964 HCHG RECOVERY ROOM TIME 1HR

## 2017-11-04 PROCEDURE — 700111 HCHG RX REV CODE 636 W/ 250 OVERRIDE (IP): Performed by: ANESTHESIOLOGY

## 2017-11-04 PROCEDURE — 306828 HCHG ANES-TIME GENERAL: Performed by: OBSTETRICS & GYNECOLOGY

## 2017-11-04 PROCEDURE — 700105 HCHG RX REV CODE 258: Performed by: ANESTHESIOLOGY

## 2017-11-04 PROCEDURE — 700111 HCHG RX REV CODE 636 W/ 250 OVERRIDE (IP)

## 2017-11-04 PROCEDURE — 59514 CESAREAN DELIVERY ONLY: CPT

## 2017-11-04 RX ORDER — SODIUM CHLORIDE, SODIUM LACTATE, POTASSIUM CHLORIDE, CALCIUM CHLORIDE 600; 310; 30; 20 MG/100ML; MG/100ML; MG/100ML; MG/100ML
INJECTION, SOLUTION INTRAVENOUS PRN
Status: DISCONTINUED | OUTPATIENT
Start: 2017-11-04 | End: 2017-11-08 | Stop reason: HOSPADM

## 2017-11-04 RX ORDER — METHYLDOPA 250 MG/1
250 TABLET, FILM COATED ORAL 3 TIMES DAILY
Status: DISCONTINUED | OUTPATIENT
Start: 2017-11-04 | End: 2017-11-08 | Stop reason: HOSPADM

## 2017-11-04 RX ORDER — DIPHENHYDRAMINE HYDROCHLORIDE 50 MG/ML
25 INJECTION INTRAMUSCULAR; INTRAVENOUS EVERY 6 HOURS PRN
Status: DISCONTINUED | OUTPATIENT
Start: 2017-11-04 | End: 2017-11-05

## 2017-11-04 RX ORDER — VITAMIN A ACETATE, BETA CAROTENE, ASCORBIC ACID, CHOLECALCIFEROL, .ALPHA.-TOCOPHEROL ACETATE, DL-, THIAMINE MONONITRATE, RIBOFLAVIN, NIACINAMIDE, PYRIDOXINE HYDROCHLORIDE, FOLIC ACID, CYANOCOBALAMIN, CALCIUM CARBONATE, FERROUS FUMARATE, ZINC OXIDE, CUPRIC OXIDE 3080; 12; 120; 400; 1; 1.84; 3; 20; 22; 920; 25; 200; 27; 10; 2 [IU]/1; UG/1; MG/1; [IU]/1; MG/1; MG/1; MG/1; MG/1; MG/1; [IU]/1; MG/1; MG/1; MG/1; MG/1; MG/1
1 TABLET, FILM COATED ORAL EVERY MORNING
Status: DISCONTINUED | OUTPATIENT
Start: 2017-11-04 | End: 2017-11-08 | Stop reason: HOSPADM

## 2017-11-04 RX ORDER — METOCLOPRAMIDE HYDROCHLORIDE 5 MG/ML
10 INJECTION INTRAMUSCULAR; INTRAVENOUS ONCE
Status: COMPLETED | OUTPATIENT
Start: 2017-11-04 | End: 2017-11-04

## 2017-11-04 RX ORDER — OXYCODONE AND ACETAMINOPHEN 10; 325 MG/1; MG/1
1 TABLET ORAL EVERY 4 HOURS PRN
Status: DISCONTINUED | OUTPATIENT
Start: 2017-11-04 | End: 2017-11-05

## 2017-11-04 RX ORDER — SIMETHICONE 80 MG
80 TABLET,CHEWABLE ORAL 4 TIMES DAILY PRN
Status: DISCONTINUED | OUTPATIENT
Start: 2017-11-04 | End: 2017-11-08 | Stop reason: HOSPADM

## 2017-11-04 RX ORDER — NALOXONE HYDROCHLORIDE 0.4 MG/ML
0.1 INJECTION, SOLUTION INTRAMUSCULAR; INTRAVENOUS; SUBCUTANEOUS PRN
Status: DISCONTINUED | OUTPATIENT
Start: 2017-11-04 | End: 2017-11-05

## 2017-11-04 RX ORDER — SODIUM CHLORIDE, SODIUM LACTATE, POTASSIUM CHLORIDE, CALCIUM CHLORIDE 600; 310; 30; 20 MG/100ML; MG/100ML; MG/100ML; MG/100ML
1500 INJECTION, SOLUTION INTRAVENOUS ONCE
Status: COMPLETED | OUTPATIENT
Start: 2017-11-04 | End: 2017-11-04

## 2017-11-04 RX ORDER — LABETALOL 100 MG/1
200 TABLET, FILM COATED ORAL TWICE DAILY
Status: DISCONTINUED | OUTPATIENT
Start: 2017-11-04 | End: 2017-11-08 | Stop reason: HOSPADM

## 2017-11-04 RX ORDER — DIPHENHYDRAMINE HYDROCHLORIDE 50 MG/ML
12.5 INJECTION INTRAMUSCULAR; INTRAVENOUS
Status: DISCONTINUED | OUTPATIENT
Start: 2017-11-04 | End: 2017-11-04

## 2017-11-04 RX ORDER — ONDANSETRON 2 MG/ML
4 INJECTION INTRAMUSCULAR; INTRAVENOUS EVERY 6 HOURS PRN
Status: DISCONTINUED | OUTPATIENT
Start: 2017-11-04 | End: 2017-11-05

## 2017-11-04 RX ORDER — NIFEDIPINE 30 MG/1
30 TABLET, EXTENDED RELEASE ORAL
Status: DISCONTINUED | OUTPATIENT
Start: 2017-11-05 | End: 2017-11-08 | Stop reason: HOSPADM

## 2017-11-04 RX ORDER — METHYLDOPA 250 MG/1
250 TABLET, FILM COATED ORAL EVERY 8 HOURS
Status: DISCONTINUED | OUTPATIENT
Start: 2017-11-04 | End: 2017-11-04

## 2017-11-04 RX ORDER — DOCUSATE SODIUM 100 MG/1
100 CAPSULE, LIQUID FILLED ORAL 2 TIMES DAILY PRN
Status: DISCONTINUED | OUTPATIENT
Start: 2017-11-04 | End: 2017-11-08 | Stop reason: HOSPADM

## 2017-11-04 RX ORDER — LABETALOL 100 MG/1
200 TABLET, FILM COATED ORAL TWICE DAILY
Status: DISCONTINUED | OUTPATIENT
Start: 2017-11-04 | End: 2017-11-04

## 2017-11-04 RX ORDER — METOCLOPRAMIDE HYDROCHLORIDE 5 MG/ML
10 INJECTION INTRAMUSCULAR; INTRAVENOUS EVERY 6 HOURS PRN
Status: DISCONTINUED | OUTPATIENT
Start: 2017-11-04 | End: 2017-11-05

## 2017-11-04 RX ORDER — NIFEDIPINE 30 MG/1
30 TABLET, EXTENDED RELEASE ORAL
Status: DISCONTINUED | OUTPATIENT
Start: 2017-11-04 | End: 2017-11-04

## 2017-11-04 RX ORDER — CITRIC ACID/SODIUM CITRATE 334-500MG
30 SOLUTION, ORAL ORAL ONCE
Status: COMPLETED | OUTPATIENT
Start: 2017-11-04 | End: 2017-11-04

## 2017-11-04 RX ORDER — MISOPROSTOL 200 UG/1
600 TABLET ORAL
Status: DISCONTINUED | OUTPATIENT
Start: 2017-11-04 | End: 2017-11-08 | Stop reason: HOSPADM

## 2017-11-04 RX ORDER — KETOROLAC TROMETHAMINE 30 MG/ML
30 INJECTION, SOLUTION INTRAMUSCULAR; INTRAVENOUS EVERY 6 HOURS
Status: DISCONTINUED | OUTPATIENT
Start: 2017-11-04 | End: 2017-11-05

## 2017-11-04 RX ORDER — ONDANSETRON 2 MG/ML
4 INJECTION INTRAMUSCULAR; INTRAVENOUS
Status: DISCONTINUED | OUTPATIENT
Start: 2017-11-04 | End: 2017-11-04

## 2017-11-04 RX ADMIN — KETOROLAC TROMETHAMINE 30 MG: 30 INJECTION, SOLUTION INTRAMUSCULAR at 18:00

## 2017-11-04 RX ADMIN — METOCLOPRAMIDE 10 MG: 5 INJECTION, SOLUTION INTRAMUSCULAR; INTRAVENOUS at 10:32

## 2017-11-04 RX ADMIN — SODIUM CHLORIDE, POTASSIUM CHLORIDE, SODIUM LACTATE AND CALCIUM CHLORIDE: 600; 310; 30; 20 INJECTION, SOLUTION INTRAVENOUS at 02:14

## 2017-11-04 RX ADMIN — NIFEDIPINE 30 MG: 30 TABLET, FILM COATED, EXTENDED RELEASE ORAL at 08:58

## 2017-11-04 RX ADMIN — Medication 1 TABLET: at 18:00

## 2017-11-04 RX ADMIN — SODIUM CITRATE AND CITRIC ACID MONOHYDRATE 30 ML: 500; 334 SOLUTION ORAL at 10:32

## 2017-11-04 RX ADMIN — LABETALOL HYDROCHLORIDE 200 MG: 100 TABLET, FILM COATED ORAL at 08:58

## 2017-11-04 RX ADMIN — METHYLDOPA 250 MG: 250 TABLET ORAL at 21:20

## 2017-11-04 RX ADMIN — FAMOTIDINE 20 MG: 10 INJECTION, SOLUTION INTRAVENOUS at 10:32

## 2017-11-04 RX ADMIN — LABETALOL HYDROCHLORIDE 200 MG: 100 TABLET, FILM COATED ORAL at 21:19

## 2017-11-04 RX ADMIN — METHYLDOPA 250 MG: 250 TABLET ORAL at 15:41

## 2017-11-04 RX ADMIN — METHYLDOPA 250 MG: 250 TABLET ORAL at 08:58

## 2017-11-04 RX ADMIN — SODIUM CHLORIDE, POTASSIUM CHLORIDE, SODIUM LACTATE AND CALCIUM CHLORIDE 1000 ML: 600; 310; 30; 20 INJECTION, SOLUTION INTRAVENOUS at 09:42

## 2017-11-04 ASSESSMENT — PAIN SCALES - GENERAL
PAINLEVEL_OUTOF10: 0

## 2017-11-04 NOTE — PROGRESS NOTES
Report received.PT denies needs.     2030 Spoke to Dr Noble, hold AM insulin prior to C/S.     2158 RN at bedside for meds per MAR.    0214 RN at bedside, meds per MAR.     0400 PT sleeping. No distress noted.    0705 Report to Héctor Alonso.

## 2017-11-04 NOTE — PROGRESS NOTES
1500-Report received from JUANITA Herrera.  Care assumed.  Patient denies needs at this time.  Patient encouraged to call with needs.

## 2017-11-04 NOTE — OP REPORT
DATE OF SERVICE:  2017    PREOPERATIVE DIAGNOSIS:  Intrauterine pregnancy at 32+ weeks with severe   preeclampsia, remote from delivery.    POSTOPERATIVE DIAGNOSIS:  Intrauterine pregnancy at 32+ weeks with severe   preeclampsia, remote from delivery.    SURGEON:  Goran Webb MD.    ASSISTANT:  Torrey Drake MD.    PROCEDURE:  Primary low transverse uterine  section.    COMPLICATIONS:  None.    DRAINS:  Martinez catheter.    ANESTHESIA:  Spinal.    ANESTHESIOLOGIST:  Brien Jesus DO.    ESTIMATED BLOOD LOSS:  800 mL.    SPECIMENS:  None.    COMPLICATIONS:  None.    INDICATIONS:  This patient is a 27-year-old  female,  1, para   0, currently at 32+ weeks intrauterine pregnancy who developed severe   preeclampsia, cervix is very unfavorable.    FINDINGS:  Cephalic presentation, clear amniotic fluid, normal pelvis, Apgar   scores 8 and 9.    OPERATION:  After adequately being counseled, the patient was taken to the   operating room and placed in the supine position.  Fetal heart tones were   known to be normal before and after placement of spinal.  She was prepped and   draped as noted.  Pfannenstiel skin incision made 2 cm above the pubic bone   with a scalpel, taken down to the fascia.  The fascia was incised with scalpel   and the fascial incision taken laterally on both sides with Lara scissors.    Rectus fascia was dissected off to underlying rectus muscles and the rectus   muscles were split in the midline.  The peritoneal cavity was entered sharply   with Metzenbaum scissors after tenting up the peritoneal lining using   hemostat.  Next, the peritoneal incision taken superiorly and inferiorly and   bladder blade placed over the bladder.  Next, a bladder flap was developed   both sharply and bluntly and a bladder blade replaced over the bladder.  Next,   a low transverse uterine incision was made in the uterus using a scalpel.    The infant was delivered, bulb suctioned,  umbilical cord clamped and cut after   delayed cord clamping of 45 seconds and the infant was handed off to NICU   nurses.  The placenta was then removed from the uterus.  Uterine cavity was   cleansed with a moist laparotomy sponge.  Uterus was exteriorized and uterine   incision closed in 2 layers using 0 Vicryl in a running locking fashion.    Second layer was an imbricating layer.  Next, uterus returned to abdominal   pelvic cavity.  The pelvis was irrigated and suctioned and electrocautery used   for hemostasis.  The peritoneal lining was closed using running nonlocked   stitch of 0 Vicryl.  The rectus muscles were reapproximated using interrupted   stitch of 0 chromic and the rectus fascia closed using running nonlocked   stitch of 0 Vicryl.  Subcutaneous tissues were irrigated and suctioned and   electrocautery used for hemostasis.  Several subcuticular stitches of 0 Vicryl   used to reapproximate subcutaneous tissues and skin was closed using surgical   staples.  Pressure dressing was applied and the patient was taken to recovery   room in good condition.  No complications noted.       ____________________________________     MD DAMEON KIDD / JUAN C    DD:  11/04/2017 12:43:05  DT:  11/04/2017 13:18:16    D#:  [id number]  Job#:  [id number]

## 2017-11-04 NOTE — CARE PLAN
Problem: Altered physiologic condition related to postoperative  delivery  Goal: Patient physiologically stable as evidenced by normal lochia, palpable uterine involution and vital signs within normal limits  Outcome: PROGRESSING AS EXPECTED  Fundus is firm at umbilicus midline and lochia is light rubra    Problem: Alteration in comfort related to surgical incision and/or after birth pains  Goal: Patient verbalizes acceptable pain level  Outcome: PROGRESSING AS EXPECTED  Patient denies any pain at this time and is aware to notify RN if she needs pain medication

## 2017-11-04 NOTE — PROGRESS NOTES
Patient received from labor and delivery via gurney to room S335 at 1315. Bedside report received from Linda Perry RN , assumed care of patient.  Patient oriented to room and surroundings, call system, Sk[employer] education channel, visiting policy, infant security including ID bands, 0-10 pain scale and pain management discussed. Patient denies any pain at this time. Patient instructed to call for assistance as needed. IV infiltrated to right hand, will restart. Orders clarified with Dr. Webb. At 1500, bedside report given to Raven GRANT who assumed care of patient.

## 2017-11-05 LAB
ERYTHROCYTE [DISTWIDTH] IN BLOOD BY AUTOMATED COUNT: 46.4 FL (ref 35.9–50)
GLUCOSE BLD-MCNC: 69 MG/DL (ref 65–99)
HCT VFR BLD AUTO: 37.5 % (ref 37–47)
HGB BLD-MCNC: 12.6 G/DL (ref 12–16)
MCH RBC QN AUTO: 29.6 PG (ref 27–33)
MCHC RBC AUTO-ENTMCNC: 33.6 G/DL (ref 33.6–35)
MCV RBC AUTO: 88 FL (ref 81.4–97.8)
PLATELET # BLD AUTO: 243 K/UL (ref 164–446)
PMV BLD AUTO: 10.6 FL (ref 9–12.9)
RBC # BLD AUTO: 4.26 M/UL (ref 4.2–5.4)
WBC # BLD AUTO: 15 K/UL (ref 4.8–10.8)

## 2017-11-05 PROCEDURE — A9270 NON-COVERED ITEM OR SERVICE: HCPCS | Performed by: ANESTHESIOLOGY

## 2017-11-05 PROCEDURE — 700111 HCHG RX REV CODE 636 W/ 250 OVERRIDE (IP): Performed by: ANESTHESIOLOGY

## 2017-11-05 PROCEDURE — 36415 COLL VENOUS BLD VENIPUNCTURE: CPT

## 2017-11-05 PROCEDURE — A9270 NON-COVERED ITEM OR SERVICE: HCPCS | Performed by: OBSTETRICS & GYNECOLOGY

## 2017-11-05 PROCEDURE — 82962 GLUCOSE BLOOD TEST: CPT

## 2017-11-05 PROCEDURE — 700102 HCHG RX REV CODE 250 W/ 637 OVERRIDE(OP): Performed by: OBSTETRICS & GYNECOLOGY

## 2017-11-05 PROCEDURE — 85027 COMPLETE CBC AUTOMATED: CPT

## 2017-11-05 PROCEDURE — 700102 HCHG RX REV CODE 250 W/ 637 OVERRIDE(OP): Performed by: ANESTHESIOLOGY

## 2017-11-05 PROCEDURE — 770002 HCHG ROOM/CARE - OB PRIVATE (112)

## 2017-11-05 PROCEDURE — 700112 HCHG RX REV CODE 229: Performed by: OBSTETRICS & GYNECOLOGY

## 2017-11-05 RX ORDER — DIPHENHYDRAMINE HYDROCHLORIDE 50 MG/ML
25 INJECTION INTRAMUSCULAR; INTRAVENOUS EVERY 6 HOURS PRN
Status: DISCONTINUED | OUTPATIENT
Start: 2017-11-05 | End: 2017-11-08 | Stop reason: HOSPADM

## 2017-11-05 RX ORDER — ONDANSETRON 4 MG/1
4 TABLET, ORALLY DISINTEGRATING ORAL EVERY 6 HOURS PRN
Status: DISCONTINUED | OUTPATIENT
Start: 2017-11-05 | End: 2017-11-08 | Stop reason: HOSPADM

## 2017-11-05 RX ORDER — OXYCODONE HYDROCHLORIDE AND ACETAMINOPHEN 5; 325 MG/1; MG/1
1 TABLET ORAL EVERY 4 HOURS PRN
Status: DISCONTINUED | OUTPATIENT
Start: 2017-11-05 | End: 2017-11-08 | Stop reason: HOSPADM

## 2017-11-05 RX ORDER — DIPHENHYDRAMINE HCL 25 MG
25 TABLET ORAL EVERY 6 HOURS PRN
Status: DISCONTINUED | OUTPATIENT
Start: 2017-11-05 | End: 2017-11-08 | Stop reason: HOSPADM

## 2017-11-05 RX ORDER — ONDANSETRON 2 MG/ML
4 INJECTION INTRAMUSCULAR; INTRAVENOUS EVERY 6 HOURS PRN
Status: DISCONTINUED | OUTPATIENT
Start: 2017-11-05 | End: 2017-11-08 | Stop reason: HOSPADM

## 2017-11-05 RX ORDER — ACETAMINOPHEN 325 MG/1
325 TABLET ORAL EVERY 4 HOURS PRN
Status: DISCONTINUED | OUTPATIENT
Start: 2017-11-05 | End: 2017-11-08 | Stop reason: HOSPADM

## 2017-11-05 RX ORDER — IBUPROFEN 600 MG/1
600 TABLET ORAL EVERY 6 HOURS PRN
Status: DISCONTINUED | OUTPATIENT
Start: 2017-11-05 | End: 2017-11-08 | Stop reason: HOSPADM

## 2017-11-05 RX ORDER — OXYCODONE HYDROCHLORIDE 10 MG/1
10 TABLET ORAL EVERY 4 HOURS PRN
Status: DISCONTINUED | OUTPATIENT
Start: 2017-11-05 | End: 2017-11-08 | Stop reason: HOSPADM

## 2017-11-05 RX ADMIN — IBUPROFEN 600 MG: 600 TABLET, FILM COATED ORAL at 12:16

## 2017-11-05 RX ADMIN — NIFEDIPINE 30 MG: 30 TABLET, FILM COATED, EXTENDED RELEASE ORAL at 09:48

## 2017-11-05 RX ADMIN — KETOROLAC TROMETHAMINE 30 MG: 30 INJECTION, SOLUTION INTRAMUSCULAR at 00:32

## 2017-11-05 RX ADMIN — METHYLDOPA 250 MG: 250 TABLET ORAL at 09:48

## 2017-11-05 RX ADMIN — LABETALOL HYDROCHLORIDE 200 MG: 100 TABLET, FILM COATED ORAL at 09:49

## 2017-11-05 RX ADMIN — OXYCODONE HYDROCHLORIDE AND ACETAMINOPHEN 1 TABLET: 10; 325 TABLET ORAL at 08:02

## 2017-11-05 RX ADMIN — METHYLDOPA 250 MG: 250 TABLET ORAL at 16:24

## 2017-11-05 RX ADMIN — KETOROLAC TROMETHAMINE 30 MG: 30 INJECTION, SOLUTION INTRAMUSCULAR at 06:04

## 2017-11-05 RX ADMIN — IBUPROFEN 600 MG: 600 TABLET, FILM COATED ORAL at 21:01

## 2017-11-05 RX ADMIN — OXYCODONE HYDROCHLORIDE 10 MG: 10 TABLET ORAL at 17:41

## 2017-11-05 RX ADMIN — OXYCODONE HYDROCHLORIDE 10 MG: 10 TABLET ORAL at 12:16

## 2017-11-05 RX ADMIN — Medication 1 TABLET: at 08:02

## 2017-11-05 RX ADMIN — HYDROMORPHONE HYDROCHLORIDE 0.4 MG: 1 INJECTION, SOLUTION INTRAMUSCULAR; INTRAVENOUS; SUBCUTANEOUS at 09:58

## 2017-11-05 RX ADMIN — DOCUSATE SODIUM 100 MG: 100 CAPSULE ORAL at 08:02

## 2017-11-05 RX ADMIN — LABETALOL HYDROCHLORIDE 200 MG: 100 TABLET, FILM COATED ORAL at 21:01

## 2017-11-05 RX ADMIN — METHYLDOPA 250 MG: 250 TABLET ORAL at 21:01

## 2017-11-05 ASSESSMENT — PAIN SCALES - GENERAL
PAINLEVEL_OUTOF10: 2
PAINLEVEL_OUTOF10: 7
PAINLEVEL_OUTOF10: 7
PAINLEVEL_OUTOF10: 0
PAINLEVEL_OUTOF10: 0
PAINLEVEL_OUTOF10: 7
PAINLEVEL_OUTOF10: 9
PAINLEVEL_OUTOF10: 1
PAINLEVEL_OUTOF10: 2
PAINLEVEL_OUTOF10: 0
PAINLEVEL_OUTOF10: 0
PAINLEVEL_OUTOF10: 2
PAINLEVEL_OUTOF10: 1

## 2017-11-05 NOTE — PROGRESS NOTES
1700 Assume care. Denies pain at this time. Fundus firm lochia light to scant. Instructed to call if need assistance.

## 2017-11-05 NOTE — PROGRESS NOTES
Received bedside report from JUANITA Fields. Whiteboards updated, POC discussed. Patient in bed, pulse oximeter and SCD's in place. Martinez draining to gravity. Patient denies any pain at this time and will call if needing pain medication. Call light within reach. Patient encouraged to call with any needs and or concerns.

## 2017-11-05 NOTE — PROGRESS NOTES
Observed pumping session, father eager to help and able to assist mother with centering nipples in pump flanges. Encouraged parents to watch [employer] Hand Expression video and practice hand expression after pumping. Reviewed pump use, settings, and routine to establish milk supply. Reviewed milk collection and cleaning of pump parts. Will have WIC see tomorrow to assess for eligibility.

## 2017-11-05 NOTE — PROGRESS NOTES
Name:   Angelina Cox   Date/Time:  2017 6:49 AM  Gestational Age:  32w3d  Admit Date:   2017  Admitting Dx:   pregnancy  Labor and delivery indication for care or intervention  32 +2 WEEKS    POD# 1 S/P primary  due to pre-eclampsia    S:  Abdominal pain yes   Ambulating   yes  Tolerating PO  yes  Flatus    yes  Bleeding   yes  Voiding   yes   Dizziness   no  Breast feeding  yes  Breast tenderness  yes    O:  Pulse: 83, Heart Rate (Monitored): 85  Blood Pressure: 137/87, NIBP: 132/76     Temp  Av.7 °C (98.1 °F)  Min: 36.3 °C (97.4 °F)  Max: 36.9 °C (98.5 °F)  Heart: regular rate and rhythm without gallops or murmurs  Lungs: clear bases  Abdomen: flat and soft/nontender / bowelsounds present / incision clean and dry.  Extremities: non-tender  Catheter: DC'd    Intake/Output Summary (Last 24 hours) at 17 0649  Last data filed at 17 0400   Gross per 24 hour   Intake             2950 ml   Output             2150 ml   Net              800 ml       A:  POD# 1 S/P primary  dur to pre-eclampsia   Stable/progressing well    P:  Routine C/S Postpartum care, continue pain management, encourage ambulation, anticipate DC POD#3     Torrey Drake M.D.

## 2017-11-05 NOTE — CARE PLAN
Problem: Altered physiologic condition related to postoperative  delivery  Goal: Patient physiologically stable as evidenced by normal lochia, palpable uterine involution and vital signs within normal limits  Outcome: PROGRESSING AS EXPECTED  Fundus firm with light lochia. H/H came back WNL. Patient educated on when to pull emergency call light.     Problem: Alteration in comfort related to surgical incision and/or after birth pains  Goal: Patient verbalizes acceptable pain level  Outcome: PROGRESSING AS EXPECTED  Patient declined pain throughout shift. Aware of options. Patient will call when needing pain medication.

## 2017-11-06 PROCEDURE — 770002 HCHG ROOM/CARE - OB PRIVATE (112)

## 2017-11-06 PROCEDURE — 700102 HCHG RX REV CODE 250 W/ 637 OVERRIDE(OP): Performed by: OBSTETRICS & GYNECOLOGY

## 2017-11-06 PROCEDURE — A9270 NON-COVERED ITEM OR SERVICE: HCPCS | Performed by: OBSTETRICS & GYNECOLOGY

## 2017-11-06 RX ADMIN — LABETALOL HYDROCHLORIDE 200 MG: 100 TABLET, FILM COATED ORAL at 08:30

## 2017-11-06 RX ADMIN — LABETALOL HYDROCHLORIDE 200 MG: 100 TABLET, FILM COATED ORAL at 20:43

## 2017-11-06 RX ADMIN — METHYLDOPA 250 MG: 250 TABLET ORAL at 15:47

## 2017-11-06 RX ADMIN — METHYLDOPA 250 MG: 250 TABLET ORAL at 20:43

## 2017-11-06 RX ADMIN — NIFEDIPINE 30 MG: 30 TABLET, FILM COATED, EXTENDED RELEASE ORAL at 08:30

## 2017-11-06 RX ADMIN — METHYLDOPA 250 MG: 250 TABLET ORAL at 08:30

## 2017-11-06 RX ADMIN — Medication 1 TABLET: at 08:30

## 2017-11-06 ASSESSMENT — PAIN SCALES - GENERAL
PAINLEVEL_OUTOF10: 0

## 2017-11-06 NOTE — PROGRESS NOTES
Assessment completed.  Patient progressing according to plan of care.  Patient encouraged to call for any needs.  Discussed pain management. Patient was medicated with Dilaudid IV for breakthrough pain after being medicated with Percocet with stated pain relief. Patient states she would like to take her pain medication scheduled. FOB at bedside.

## 2017-11-06 NOTE — PROGRESS NOTES
Encouraged use of incentive spirometer as IS was placed above and behind patient's HOB so she was unable to reach it. IS placed on bedside table. Patient states having mostly no pain.

## 2017-11-06 NOTE — CARE PLAN
Problem: Potential for postpartum infection related to surgical incision, compromised uterine condition, urinary tract or respiratory compromise  Goal: Patient will be afebrile and free from signs and symptoms of infection  Outcome: PROGRESSING AS EXPECTED  Patient is afebrile. Low transverse abdominal incision is approximated and staples are intact. Incision is without erythema, swelling or drainage.    Problem: Alteration in comfort related to surgical incision and/or after birth pains  Goal: Patient verbalizes acceptable pain level  Outcome: PROGRESSING AS EXPECTED  Patient states adequate incisional pain relief from oxycodone IR.

## 2017-11-06 NOTE — CARE PLAN
Problem: Altered physiologic condition related to postoperative  delivery  Goal: Patient physiologically stable as evidenced by normal lochia, palpable uterine involution and vital signs within normal limits  Outcome: PROGRESSING AS EXPECTED  Fundus firm at umbilicus with light lochia.    Problem: Potential for postpartum infection related to surgical incision, compromised uterine condition, urinary tract or respiratory compromise  Goal: Patient will be afebrile and free from signs and symptoms of infection  Outcome: PROGRESSING AS EXPECTED  Afebrile with incision clean,dry, approximated and open to air.    Problem: Alteration in comfort related to surgical incision and/or after birth pains  Goal: Patient is able to ambulate, care for self and infant with acceptable pain level  Outcome: PROGRESSING AS EXPECTED  Ambulating and voiding without difficulty.  Goal: Patient verbalizes acceptable pain level  Outcome: PROGRESSING AS EXPECTED  Verbalizes acceptable pain relief with pain medication being given as requested.    Problem: Potential anxiety related to difficulty adapting to parental role  Goal: Patient will verbalize and demonstrate effective bonding and parenting behavior  Outcome: PROGRESSING AS EXPECTED  Visits infant in NICU and using breast pump as instructed.

## 2017-11-06 NOTE — PROGRESS NOTES
Post Partum Progress Note    Name:   Angelina Cox   Date/Time:  2017 - 6:14 AM  Chief Admitting Dx:  pregnancy  Labor and delivery indication for care or intervention  32 +2 WEEKS  Delivery Type:   for severe pre-eclampsia  Post-Op/Post Partum Days #:  2    Subjective:  Abdominal pain: no  Ambulating:   yes  Tolerating liquids:  yes  Tolerating food:  yes common adult  Flatus:   yes  BM:    no  Bleeding:   without any bleeding  Voiding:   yes  Dizziness:   no  Feeding:   On pumping schedule for ICN    Vitals:    17 1624 17 2000 17 0000 17 0400   BP: 147/93 146/96 124/75 142/89   Pulse: (!) 55 70 69 67   Resp: 16 (!) 24 20 20   Temp: 36.5 °C (97.7 °F) 36.8 °C (98.2 °F) 36.4 °C (97.5 °F) 36.8 °C (98.3 °F)   SpO2: 96% 96% 92% 94%   Weight:       Height:           Exam:  Breast: Tenderness no, Engorged no and Lactating yes  Abdomen: Abdomen soft, non-tender. BS normal. No masses,  No organomegaly  Fundal Tenderness:  no  Fundus Firm: yes  Incision: dry and intact  Below umbilicus: yes  Perineum: perineum intact  Lochia: mild  Extremities: Normal, 2+ edema extremities, peripheral pulses and reflexes normal, no edema, redness or tenderness in the calves or thighs, feet normal, good pulses, normal color, temperature and sensation    Meds:  Current Facility-Administered Medications   Medication Dose   • ibuprofen (MOTRIN) tablet 600 mg  600 mg   • acetaminophen (TYLENOL) tablet 325 mg  325 mg   • oxycodone-acetaminophen (PERCOCET) 5-325 MG per tablet 1 Tab  1 Tab   • oxycodone immediate release (ROXICODONE) tablet 10 mg  10 mg   • diphenhydrAMINE (BENADRYL) tablet/capsule 25 mg  25 mg    Or   • diphenhydrAMINE (BENADRYL) injection 25 mg  25 mg   • ondansetron (ZOFRAN) syringe/vial injection 4 mg  4 mg    Or   • ondansetron (ZOFRAN ODT) dispertab 4 mg  4 mg   • LR infusion     • PRN oxytocin (PITOCIN) (20 Units/1000 mL) PRN for excessive uterine bleeding - See Admin Instr  743-764  mL/hr   • misoprostol (CYTOTEC) tablet 600 mcg  600 mcg   • docusate sodium (COLACE) capsule 100 mg  100 mg   • simethicone (MYLICON) chewable tab 80 mg  80 mg   • prenatal plus vitamin (STUARTNATAL 1+1) 27-1 MG tablet 1 Tab  1 Tab   • labetalol (NORMODYNE) tablet 200 mg  200 mg   • methyldopa (ALDOMET) tablet 250 mg  250 mg   • NIFEdipine SR (PROCARDIA-XL) tablet 30 mg  30 mg       Labs:   Recent Labs      17   0510   WBC  15.0*   RBC  4.26   HEMOGLOBIN  12.6   HEMATOCRIT  37.5   MCV  88.0   MCH  29.6   MCHC  33.6   RDW  46.4   PLATELETCT  243   MPV  10.6       Assessment:  Chief Admitting Dx:  pregnancy  Labor and delivery indication for care or intervention  32 +2 WEEKS  Delivery Type:   for Severe PIH  Tubal Ligation:  no    Plan:  Continue routine post partum care.  Continue to monitor BP  Anticipate discharge POD#3    Namrata Kay [id number].

## 2017-11-06 NOTE — DISCHARGE PLANNING
Referral:  NICU Admit - Prematurity 32 weeks 2 days     Infant:  Amarilys Gonzalez     Intervention:  Reviewed chart and spoke with nursing.  Met with parents, Angelina and Kailash Cox.  Parents are living at [address] [place] [address], [place] [address]. Angelina's cell [phone] and Kailash's cell [phone]. This is couple's first child. Parents are making preparations for infant at home. Angelina is receiving Medicaid FFS and would like to apply for [facility].  Pediatrician List provided.     -Parents have visited in the NICU and advise their questions are being answered. Provided support.     -[facility] information provided and referral made. Parents are aware Angelina will need someone with her for the first seven days after discharge.     -Updated Olga GRANT who will request [facility] consult when representative arrives to round on patients.     -Plan:   will continue to assist as needed.

## 2017-11-07 PROCEDURE — 700102 HCHG RX REV CODE 250 W/ 637 OVERRIDE(OP): Performed by: OBSTETRICS & GYNECOLOGY

## 2017-11-07 PROCEDURE — A9270 NON-COVERED ITEM OR SERVICE: HCPCS | Performed by: OBSTETRICS & GYNECOLOGY

## 2017-11-07 PROCEDURE — 770002 HCHG ROOM/CARE - OB PRIVATE (112)

## 2017-11-07 RX ADMIN — IBUPROFEN 600 MG: 600 TABLET, FILM COATED ORAL at 08:23

## 2017-11-07 RX ADMIN — NIFEDIPINE 30 MG: 30 TABLET, FILM COATED, EXTENDED RELEASE ORAL at 08:23

## 2017-11-07 RX ADMIN — LABETALOL HYDROCHLORIDE 200 MG: 100 TABLET, FILM COATED ORAL at 08:23

## 2017-11-07 RX ADMIN — METHYLDOPA 250 MG: 250 TABLET ORAL at 08:23

## 2017-11-07 RX ADMIN — OXYCODONE HYDROCHLORIDE AND ACETAMINOPHEN 1 TABLET: 5; 325 TABLET ORAL at 08:23

## 2017-11-07 RX ADMIN — LABETALOL HYDROCHLORIDE 200 MG: 100 TABLET, FILM COATED ORAL at 21:06

## 2017-11-07 RX ADMIN — METHYLDOPA 250 MG: 250 TABLET ORAL at 21:06

## 2017-11-07 RX ADMIN — Medication 1 TABLET: at 08:23

## 2017-11-07 RX ADMIN — METHYLDOPA 250 MG: 250 TABLET ORAL at 16:31

## 2017-11-07 ASSESSMENT — PAIN SCALES - GENERAL
PAINLEVEL_OUTOF10: 1
PAINLEVEL_OUTOF10: 1
PAINLEVEL_OUTOF10: 0
PAINLEVEL_OUTOF10: 3

## 2017-11-07 NOTE — PROGRESS NOTES
Staples removed from incision as ordered. Wide steristrips placed over incision, close together. Very minimal bleeding noted.

## 2017-11-07 NOTE — CONSULTS
Evaluated pump use to include frequency, duration, suction and speed settings as well as flange fit.Blade video to be reviewed today regarding hand expression and maximizing milk production.

## 2017-11-07 NOTE — CARE PLAN
Problem: Alteration in comfort related to surgical incision and/or after birth pains  Goal: Patient is able to ambulate, care for self and infant with acceptable pain level  Outcome: PROGRESSING AS EXPECTED  Assess pain every 2-4 hours. Patient will call for pain meds as desires

## 2017-11-07 NOTE — PROGRESS NOTES
Post Partum Progress Note    Name:   Angelina Cox   Date/Time:  2017 - 5:57 AM  Chief Admitting Dx:  pregnancy  Labor and delivery indication for care or intervention  32 +2 WEEKS  Delivery Type:   for Severe PIH  Post-Op/Post Partum Days #:  3    Subjective:  Abdominal pain: yes  Ambulating:   yes  Tolerating liquids:  yes  Tolerating food:  yes common adult  Flatus:   yes  BM:    yes  Bleeding:   with a small amount of bleeding  Voiding:   yes  Dizziness:   no  Feeding:   breast    Vitals:    17 1200 17 2000 17 2043 17 0000   BP: 129/78 126/86 128/89 131/89   Pulse: 81 82  81   Resp: 16 16  18   Temp: 36.9 °C (98.5 °F) 36.8 °C (98.3 °F)  37.1 °C (98.7 °F)   SpO2: 95% 97%  95%   Weight:       Height:           Exam:  Breast: Tenderness no, Engorged no and Lactating yes  Abdomen: Abdomen soft, non-tender. BS normal. No masses,  No organomegaly  Fundal Tenderness:  yes  Fundus Firm: yes  Incision: dry and intact, healing well with good reapproximation, no evidence of infection, separation or keloid formation.  Below umbilicus: yes  Perineum: perineum intact  Lochia: mild  Extremities: Normal extremities, peripheral pulses and reflexes normal    Meds:  Current Facility-Administered Medications   Medication Dose   • ibuprofen (MOTRIN) tablet 600 mg  600 mg   • acetaminophen (TYLENOL) tablet 325 mg  325 mg   • oxycodone-acetaminophen (PERCOCET) 5-325 MG per tablet 1 Tab  1 Tab   • oxycodone immediate release (ROXICODONE) tablet 10 mg  10 mg   • diphenhydrAMINE (BENADRYL) tablet/capsule 25 mg  25 mg    Or   • diphenhydrAMINE (BENADRYL) injection 25 mg  25 mg   • ondansetron (ZOFRAN) syringe/vial injection 4 mg  4 mg    Or   • ondansetron (ZOFRAN ODT) dispertab 4 mg  4 mg   • LR infusion     • PRN oxytocin (PITOCIN) (20 Units/1000 mL) PRN for excessive uterine bleeding - See Admin Instr  125-999 mL/hr   • misoprostol (CYTOTEC) tablet 600 mcg  600 mcg   • docusate sodium  (COLACE) capsule 100 mg  100 mg   • simethicone (MYLICON) chewable tab 80 mg  80 mg   • prenatal plus vitamin (STUARTNATAL 1+1) 27-1 MG tablet 1 Tab  1 Tab   • labetalol (NORMODYNE) tablet 200 mg  200 mg   • methyldopa (ALDOMET) tablet 250 mg  250 mg   • NIFEdipine SR (PROCARDIA-XL) tablet 30 mg  30 mg       Labs:   Recent Labs      17   0510   WBC  15.0*   RBC  4.26   HEMOGLOBIN  12.6   HEMATOCRIT  37.5   MCV  88.0   MCH  29.6   MCHC  33.6   RDW  46.4   PLATELETCT  243   MPV  10.6       Assessment:  Chief Admitting Dx:  pregnancy  Labor and delivery indication for care or intervention  32 +2 WEEKS  Delivery Type:   for Severe PIH  Tubal Ligation:  no    Plan:  Continue routine post partum care.  Continue meds for BP   Patient wishes to hold D/C until POD # 4    Rober Noble M.D.

## 2017-11-07 NOTE — CARE PLAN
Problem: Altered physiologic condition related to postoperative  delivery  Goal: Patient physiologically stable as evidenced by normal lochia, palpable uterine involution and vital signs within normal limits  Outcome: PROGRESSING AS EXPECTED  Assess lochia and fundus every shift and as indicated. Educate on when to contact physician

## 2017-11-07 NOTE — PROGRESS NOTES
Bedside report received from off going RN. Plan of care discussed, questions answered and understanding verbalized. Reoriented to room, call light, care board, emergency light and oliver light. Encouraged to call for assistance or with questions, comments or concerns.

## 2017-11-08 VITALS
HEIGHT: 60 IN | SYSTOLIC BLOOD PRESSURE: 138 MMHG | WEIGHT: 191 LBS | DIASTOLIC BLOOD PRESSURE: 92 MMHG | HEART RATE: 80 BPM | OXYGEN SATURATION: 97 % | RESPIRATION RATE: 16 BRPM | BODY MASS INDEX: 37.5 KG/M2 | TEMPERATURE: 97.9 F

## 2017-11-08 PROCEDURE — A9270 NON-COVERED ITEM OR SERVICE: HCPCS | Performed by: OBSTETRICS & GYNECOLOGY

## 2017-11-08 PROCEDURE — 700102 HCHG RX REV CODE 250 W/ 637 OVERRIDE(OP): Performed by: OBSTETRICS & GYNECOLOGY

## 2017-11-08 RX ORDER — IBUPROFEN 800 MG/1
800 TABLET ORAL EVERY 8 HOURS PRN
Qty: 30 TAB | Refills: 1 | Status: SHIPPED | OUTPATIENT
Start: 2017-11-08 | End: 2019-01-29

## 2017-11-08 RX ORDER — OXYCODONE HYDROCHLORIDE AND ACETAMINOPHEN 5; 325 MG/1; MG/1
1 TABLET ORAL EVERY 4 HOURS PRN
Qty: 30 TAB | Refills: 0 | Status: SHIPPED | OUTPATIENT
Start: 2017-11-08 | End: 2019-01-29

## 2017-11-08 RX ORDER — PSEUDOEPHEDRINE HCL 30 MG
100 TABLET ORAL 2 TIMES DAILY PRN
Qty: 60 CAP | Refills: 1 | Status: SHIPPED | OUTPATIENT
Start: 2017-11-08 | End: 2019-01-29

## 2017-11-08 RX ADMIN — LABETALOL HYDROCHLORIDE 200 MG: 100 TABLET, FILM COATED ORAL at 08:44

## 2017-11-08 RX ADMIN — METHYLDOPA 250 MG: 250 TABLET ORAL at 15:32

## 2017-11-08 RX ADMIN — NIFEDIPINE 30 MG: 30 TABLET, FILM COATED, EXTENDED RELEASE ORAL at 08:45

## 2017-11-08 RX ADMIN — OXYCODONE HYDROCHLORIDE AND ACETAMINOPHEN 1 TABLET: 5; 325 TABLET ORAL at 15:33

## 2017-11-08 RX ADMIN — Medication 1 TABLET: at 08:44

## 2017-11-08 RX ADMIN — METHYLDOPA 250 MG: 250 TABLET ORAL at 08:44

## 2017-11-08 RX ADMIN — OXYCODONE HYDROCHLORIDE AND ACETAMINOPHEN 1 TABLET: 5; 325 TABLET ORAL at 08:54

## 2017-11-08 ASSESSMENT — PAIN SCALES - GENERAL
PAINLEVEL_OUTOF10: 2
PAINLEVEL_OUTOF10: 1
PAINLEVEL_OUTOF10: 5
PAINLEVEL_OUTOF10: 4
PAINLEVEL_OUTOF10: 0

## 2017-11-08 NOTE — DISCHARGE SUMMARY
Discharge Summary:      Angelina Cox      Admit Date:   2017  Discharge Date:  2017     Admitting diagnosis:  pregnancy  Labor and delivery indication for care or intervention  32 +2 WEEKS  Discharge Diagnosis: Status post  for severe preeclampsia.  Pregnancy Complications: preeclampsia  Tubal Ligation:  no        History:  History reviewed. No pertinent past medical history.  OB History    Para Term  AB Living   1             SAB TAB Ectopic Molar Multiple Live Births                    # Outcome Date GA Lbr Augustus/2nd Weight Sex Delivery Anes PTL Lv   1 Current                    Review of patient's allergies indicates no known allergies.  Patient Active Problem List    Diagnosis Date Noted   • Essential hypertension 10/19/2017   • Late prenatal care 10/19/2017   • Diabetes mellitus affecting pregnancy in third trimester 10/16/2017   • Supervision of high-risk pregnancy 10/05/2017        Hospital Course:   27 y.o. , now para 1, was admitted with the above mentioned diagnosis, underwent severe preeclampsia,  for preeclampsia. Patient postpartum course was unremarkable, with progressive advancement in diet , ambulation and toleration of oral analgesia. Patient without complaints today and desires discharge.      Vitals:    17 0739 17 2000 17 0000 17 0400   BP: 133/88 136/86 127/83 136/93   Pulse: 72 82 76 91   Resp:    Temp: 36.7 °C (98 °F) 36.7 °C (98 °F) 36.8 °C (98.3 °F) 36.8 °C (98.3 °F)   SpO2: 97% 95% 97% 96%   Weight:       Height:           Current Facility-Administered Medications   Medication Dose   • ibuprofen (MOTRIN) tablet 600 mg  600 mg   • acetaminophen (TYLENOL) tablet 325 mg  325 mg   • oxycodone-acetaminophen (PERCOCET) 5-325 MG per tablet 1 Tab  1 Tab   • oxycodone immediate release (ROXICODONE) tablet 10 mg  10 mg   • diphenhydrAMINE (BENADRYL) tablet/capsule 25 mg  25 mg    Or   • diphenhydrAMINE (BENADRYL)  injection 25 mg  25 mg   • ondansetron (ZOFRAN) syringe/vial injection 4 mg  4 mg    Or   • ondansetron (ZOFRAN ODT) dispertab 4 mg  4 mg   • LR infusion     • PRN oxytocin (PITOCIN) (20 Units/1000 mL) PRN for excessive uterine bleeding - See Admin Instr  125-999 mL/hr   • misoprostol (CYTOTEC) tablet 600 mcg  600 mcg   • docusate sodium (COLACE) capsule 100 mg  100 mg   • simethicone (MYLICON) chewable tab 80 mg  80 mg   • prenatal plus vitamin (STUARTNATAL 1+1) 27-1 MG tablet 1 Tab  1 Tab   • labetalol (NORMODYNE) tablet 200 mg  200 mg   • methyldopa (ALDOMET) tablet 250 mg  250 mg   • NIFEdipine SR (PROCARDIA-XL) tablet 30 mg  30 mg       Exam:  Breast Exam: negative  Abdomen: Abdomen soft, non-tender. BS normal. No masses,  No organomegaly  Fundus Non Tender: yes  Incision: dry and intact  Perineum: perineum intact  Extremity: extremities, peripheral pulses and reflexes normal     Labs:         Activity:   Discharge to home  Pelvic Rest x 6 weeks    Assessment:  Course complicated by severe preeclampsia  Discharge Assessment: No heavy bleeding or foul vaginal discharge      [facility] up: [facility] or [facility] in 5 weeks for vaginal ; 1 week for incision check.      Discharge Meds:   Current Outpatient Prescriptions   Medication Sig Dispense Refill   • oxycodone-acetaminophen (PERCOCET) 5-325 MG Tab Take 1 Tab by mouth every four hours as needed (for Moderate Pain (Pain Scale 4-6) after delivery). 30 Tab 0   • docusate sodium 100 MG Cap Take 100 mg by mouth 2 times a day as needed for Constipation. 60 Cap 1   • ibuprofen (MOTRIN) 800 MG Tab Take 1 Tab by mouth every 8 hours as needed. 30 Tab 1       Shelbie Gabriel M.D.

## 2017-11-08 NOTE — PROGRESS NOTES
Bedside report received, assumed care of pt. Assessment complete, VSS, fundus firm, lochia light. Incision approximated with steri strips and open to air. Pt voiding without difficulty. Infant in NICU, pt visiting infant frequently and pumping every 3 hours; discussed pump settings and frequency, pt verbalizes understanding. Pt states pain is being well controlled and will call for PRN pain meds as needed. POC discussed with pt and family, questions answered, call light within reach.

## 2017-11-08 NOTE — CARE PLAN
Problem: Potential for postpartum infection related to surgical incision, compromised uterine condition, urinary tract or respiratory compromise  Goal: Patient will be afebrile and free from signs and symptoms of infection  Outcome: PROGRESSING AS EXPECTED  Pt afebrile, incision approximated with steri strips and open to air, light serosanguinous drainage noted to incision. Education provided regarding incision care. No signs of infection at this time.

## 2017-11-08 NOTE — DISCHARGE INSTRUCTIONS
POSTPARTUM DISCHARGE INSTRUCTIONS FOR MOM    YOB: 1990   Age: 27 y.o.               Admit Date: 2017     Discharge Date: 2017  Attending Doctor:  Jeanne Bhatti M.D.                  Allergies:  Review of patient's allergies indicates no known allergies.    Discharged to home by car. Discharged via wheelchair, hospital escort: Yes.  Special equipment needed: Not Applicable  Belongings with: Personal  Be sure to schedule a follow-up appointment with your primary care doctor or any specialists as instructed.     Discharge Plan:   Influenza Vaccine Indication: Not indicated: Previously immunized this influenza season and > 8 years of age    REASONS TO CALL YOUR OBSTETRICIAN:  1.   Persistent fever or shaking chills (Temperature higher than 100.4)  2.   Heavy bleeding (soaking more than 1 pad per hour); Passing clots  3.   Foul odor from vagina  4.   Mastitis (Breast infection; breast pain, chills, fever, redness)  5.   Urinary pain, burning or frequency  6.   Episiotomy infection  7.   Abdominal incision infection  8.   Severe depression longer than 24 hours    HAND WASHING  · Prior to handling the baby.  · Before breastfeeding or bottle feeding baby.  · After using the bathroom or changing the baby's diaper.    WOUND CARE  Ask your physician for additional care instructions.  In general:    ·  Incision:      · Keep clean and dry.    · Do NOT lift anything heavier than your baby for up to 6 weeks.    · There should not be any opening or pus.      VAGINAL CARE  · Nothing inside vagina for 6 weeks: no sexual intercourse, tampons or douching.  · Bleeding may continue for 2-4 weeks.  Amount may vary.    · Call your physician for heavy bleeding which means soaking more than 1 pad per hour    BIRTH CONTROL  · It is possible to become pregnant at any time after delivery and while breastfeeding.  · Plan to discuss a method of birth control with your physician at your follow up visit. visit.    DIET  "AND ELIMINATION  · Eating more fiber (bran cereal, fruits, and vegetables) and drinking plenty of fluids will help to avoid constipation.  · Urinary frequency after childbirth is normal.    POSTPARTUM BLUES  During the first few days after birth, you may experience a sense of the \"blues\" which may include impatience, irritability or even crying.  These feeling come and go quickly.  However, as many as 1 in 10 women experience emotional symptoms known as postpartum depression.    Postpartum depression:  May start as early as the second or third day after delivery or take several weeks or months to develop.  Symptoms of \"blues\" are present, but are more intense:  Crying spells; loss of appetite; feelings of hopelessness or loss of control; fear of touching the baby; over concern or no concern at all about the baby; little or no concern about your own appearance/caring for yourself; and/or inability to sleep or excessive sleeping.  Contact your physician if you are experiencing any of these symptoms.    Crisis Hotline:  · [place] Crisis Hotline:  3-811-IJBATKO  Or 1-291.918.3329  · [place] Crisis Hotline:  1-213.851.3796  Or 281-289-9140    PREVENTING SHAKEN BABY:  If you are angry or stressed, PUT THE BABY IN THE CRIB, step away, take some deep breaths, and wait until you are calm to care for the baby.  DO NOT SHAKE THE BABY.  You are not alone, call a supporter for help.    · Crisis Call Center 24/7 crisis line 418-345-1694 or 1-642.476.3316  · You can also text them, text \"ANSWER\" to 618151    QUIT SMOKING/TOBACCO USE:  I understand the use of any tobacco products increases my chance of suffering from future heart disease and could cause other illnesses which may shorten my life. Quitting the use of tobacco products is the single most important thing I can do to improve my health. For further information on smoking / tobacco cessation call a Toll Free Quit Line at 1-893.310.2919 (*National Cancer [place]) or " 1-867.703.4758 (American Lung Association) or you can access the web based program at www.lungusa.org.    · [place] Tobacco Users Help Line:  (154) 437-2600       Toll Free: 1-235.798.4451  · Quit Tobacco Program [facility] (069)007-6909    DEPRESSION / SUICIDE RISK:  As you are discharged from this [facility], it is important to learn how to keep safe from harming yourself.    Recognize the warning signs:  · Abrupt changes in personality, positive or negative- including increase in energy   · Giving away possessions  · Change in eating patterns- significant weight changes-  positive or negative  · Change in sleeping patterns- unable to sleep or sleeping all the time   · Unwillingness or inability to communicate  · Depression  · Unusual sadness, discouragement and loneliness  · Talk of wanting to die  · Neglect of personal appearance   · Rebelliousness- reckless behavior  · Withdrawal from people/activities they love  · Confusion- inability to concentrate     If you or a loved one observes any of these behaviors or has concerns about self-harm, here's what you can do:  · Talk about it- your feelings and reasons for harming yourself  · Remove any means that you might use to hurt yourself (examples: pills, rope, extension cords, firearm)  · Get professional help from the community (Mental Health, Substance Abuse, psychological counseling)  · Do not be alone:Call your Safe Contact- someone whom you trust who will be there for you.  · Call your local CRISIS HOTLINE 316-7394 or 320-714-7339  · Call your local Children's Mobile Crisis Response Team [place] (537) 543-0961 or www.Verified Person  · Call the toll free National Suicide Prevention Hotlines   · National Suicide Prevention Lifeline 618-648-GMRT (2171)  · National Hope Line Network 800-SUICIDE (512-7248)    DISCHARGE SURVEY:  Thank you for choosing [facility].  We hope we provided you with very good care.  You may be  receiving a survey in the mail.  Please fill it out.  Your opinion is valuable to us.    ADDITIONAL EDUCATIONAL MATERIALS GIVEN TO PATIENT:        My signature on this form indicates that:  1.  I have reviewed and understand the above information  2.  My questions regarding this information have been answered to my satisfaction.  3.  I have formulated a plan with my discharge nurse to obtain my prescribed medication for home.

## 2017-11-08 NOTE — CARE PLAN
Problem: Alteration in comfort related to surgical incision and/or after birth pains  Goal: Patient is able to ambulate, care for self and infant with acceptable pain level  Patient is able to ambulate    Problem: Potential knowledge deficit related to lack of understanding of self and  care  Goal: Patient will verbalize understanding of self and infant care  Patient verbalizes understanding to care for self and infant.

## 2017-11-08 NOTE — CARE PLAN
Problem: Altered physiologic condition related to postoperative  delivery  Goal: Patient physiologically stable as evidenced by normal lochia, palpable uterine involution and vital signs within normal limits  Outcome: PROGRESSING AS EXPECTED  Fundus firm, lochia light, vitals stable.

## 2017-11-14 ENCOUNTER — POST PARTUM (OUTPATIENT)
Dept: OBGYN | Facility: CLINIC | Age: 27
End: 2017-11-14
Payer: MEDICAID

## 2017-11-14 VITALS — SYSTOLIC BLOOD PRESSURE: 118 MMHG | WEIGHT: 181 LBS | DIASTOLIC BLOOD PRESSURE: 82 MMHG | BODY MASS INDEX: 35.35 KG/M2

## 2017-11-14 DIAGNOSIS — Z51.89 VISIT FOR WOUND CHECK: ICD-10-CM

## 2017-11-14 DIAGNOSIS — Z98.891 STATUS POST CESAREAN SECTION: ICD-10-CM

## 2017-11-14 PROCEDURE — 99024 POSTOP FOLLOW-UP VISIT: CPT | Performed by: OBSTETRICS & GYNECOLOGY

## 2017-11-14 NOTE — PROGRESS NOTES
CC- WOUND CHECK    History of present illness:   27 y.o.  s/p primary LTCS at 32 weeks for severe preeclampsia presents for wound check  Doing well  Pain is well controlled  No HA, no visual changes, no epigastric/RUQ pain  Baby in the NICU    Review of systems:  Pertinent positives documented in HPI and all other systems reviewed & are negative    All PMH, PSH, allergies, social history and FH reviewed and updated today:  No past medical history on file.    Past Surgical History:   Procedure Laterality Date   • PRIMARY C SECTION  2017    Procedure: PRIMARY C SECTION;  Surgeon: Goran Webb M.D.;  Location: LABOR AND DELIVERY;  Service: Gynecology       Allergies: No Known Allergies    Social History     Social History   • Marital status:      Spouse name: N/A   • Number of children: N/A   • Years of education: N/A     Occupational History   • Not on file.     Social History Main Topics   • Smoking status: Never Smoker   • Smokeless tobacco: Never Used   • Alcohol use No   • Drug use: No   • Sexual activity: Yes     Partners: Male      Comment: none     Other Topics Concern   • Not on file     Social History Narrative   • No narrative on file       Family History   Problem Relation Age of Onset   • Hypertension Mother        Physical exam:  Blood pressure 118/82, weight 82.1 kg (181 lb), last menstrual period 2017, unknown if currently breastfeeding.    General:appears stated age, is in no apparent distress, is well developed and well nourished  Abdomen: Bowel sounds positive, nondistended, soft, nontender x4, no rebound or guarding. No organomegaly. No masses.  INCISION - DRY/CLEAN/INTACT  Skin: No rashes, or ulcers or lesions seen  Psychiatric: Patient shows appropriate affect, is alert and oriented x3, intact judgment and insight.  1. Visit for wound check     2. Status post  section     3. cointinue to breast feed  4. Continue PNV  5. Keep wound dry  6. PP visit  7. Continue  Aldomet 250 BID and labetalol 200 mg BID

## 2017-11-14 NOTE — PROGRESS NOTES
Pt here for C/S check delivered on 17 (Primary low transverse uterine  section).  # [phone]  Currently pt states pumping milk baby in the hospital   Pt states no complaints.

## 2017-11-17 RX ORDER — LABETALOL 300 MG/1
TABLET, FILM COATED ORAL
Qty: 60 TAB | Refills: 1 | Status: SHIPPED | OUTPATIENT
Start: 2017-11-17 | End: 2019-01-29

## 2017-12-15 ENCOUNTER — POST PARTUM (OUTPATIENT)
Dept: OBGYN | Facility: CLINIC | Age: 27
End: 2017-12-15

## 2017-12-15 VITALS — SYSTOLIC BLOOD PRESSURE: 122 MMHG | WEIGHT: 181 LBS | DIASTOLIC BLOOD PRESSURE: 80 MMHG | BODY MASS INDEX: 35.35 KG/M2

## 2017-12-15 DIAGNOSIS — Z98.891 HISTORY OF CESAREAN DELIVERY: ICD-10-CM

## 2017-12-15 PROBLEM — O09.30 LATE PRENATAL CARE: Status: RESOLVED | Noted: 2017-10-19 | Resolved: 2017-12-15

## 2017-12-15 PROBLEM — O09.90 SUPERVISION OF HIGH-RISK PREGNANCY: Status: RESOLVED | Noted: 2017-10-05 | Resolved: 2017-12-15

## 2017-12-15 PROCEDURE — 90050 PR POSTPARTUM VISIT: CPT | Performed by: NURSE PRACTITIONER

## 2017-12-15 NOTE — NON-PROVIDER
Pt here today for postpartum exam.  Operation Date: 11/04/2017  Formula feeding only  BCM: I[facility], [facility].   LMP: not yet  Pt states having headaches  WT: 181 lb  BP: 122/80  Pt states she still taking labetalol 300 mg once daily and aldomet 250 mg once daily. Pt states she is getting headaches.  Good ph: [phone]

## 2017-12-15 NOTE — PROGRESS NOTES
Subjective:      Angelina Cox is a 27 y.o. female who presents with Postpartum care (Postpartum Exam)            HPI    ROS       Objective:     /80   Wt 82.1 kg (181 lb)   LMP 2017 (Approximate)   Breastfeeding? No   BMI 35.35 kg/m²      Physical Exam   Constitutional: She appears well-developed and well-nourished.   Eyes: Pupils are equal, round, and reactive to light.   Neck: Normal range of motion. No thyromegaly present.   Cardiovascular: Normal rate, regular rhythm and normal heart sounds.    Pulmonary/Chest: Effort normal and breath sounds normal.   Abdominal: Soft. Bowel sounds are normal.   Genitourinary: Vagina normal and uterus normal.   Neurological: She is alert.   Skin: Skin is warm and dry.   Psychiatric: She has a normal mood and affect. Her behavior is normal. Judgment and thought content normal.   Nursing note and vitals reviewed.              Assessment/Plan:     1. History of  delivery

## 2017-12-15 NOTE — PROGRESS NOTES
Subjective   Subjective:    Angelina Cox is a 27 y.o. female who presents for her postpartum exam s/p  section for severe preeclampsia remote from delivery. Patient states she is taking aldomet 250 mg daily and labetalol 200 mg daily. She self-reduced med to daily instead of BID because she was having adverse side effects and headaches. Her prenatal course was complicated by the below problem list.Her postpartum course was complicated by baby in NICU and hypertension with medication use as described above. She denies dysuria, vaginal bleeding, odor, itching or breast problems. She was nutritionally managed GDM and has not taken blood sugars since delivery. She is bottle feeding. She desires an IUD for her birth control method. Reports no sex prior to this appointment. Eating a regular diet without difficulty. Bowel movement are Normal.  The patient is not having any pain. No current menses. Patient Denies Incisional pain, drainage or redness. Patient denies postpartum depression.     Problem List     Patient Active Problem List    Diagnosis Date Noted   • Essential hypertension 10/19/2017   • Late prenatal care 10/19/2017   • Diabetes mellitus affecting pregnancy in third trimester 10/16/2017   • Supervision of high-risk pregnancy 10/05/2017       Objective    See PE  Lab: H&H upon discharge 12.6/37.5  Weight - 181 lb  Vitals - 122/80    Assessment   Assessment:    1. PP care of non-lactating women   2. Exam WNL   3. Pap none on file  4. Desires contraception - IUD    Plan   Plan:    1. Breastfeeding support not applicable  2. Continue PNV   3. Contraceptive counseling - follow up w health dept or [facility] for updated pap smear and IUD. Information given.   4. Encouraged condom use for contraceptive start up and std protection.  5. Discussed diet, exercise and resumption of sexual activity   6. Preconception guidance for next pregnancy if applicable. multiple risk factors, previous c/s,  obesity, preeclampsia, chronic htn and gdm.  Folic acid for all women of childbearing age.   7.  Follow up needed - to [facility] or [facility] for blood pressure management and GDM follow up. Follow up for updated pap and IUD.   8.  Smoking/etoh/drug screening - no exposure.

## 2019-01-29 ENCOUNTER — HOSPITAL ENCOUNTER (OUTPATIENT)
Facility: MEDICAL CENTER | Age: 29
End: 2019-01-29
Attending: ADVANCED PRACTICE MIDWIFE
Payer: MEDICAID

## 2019-01-29 ENCOUNTER — INITIAL PRENATAL (OUTPATIENT)
Dept: OBGYN | Facility: CLINIC | Age: 29
End: 2019-01-29
Payer: MEDICAID

## 2019-01-29 VITALS — BODY MASS INDEX: 34.18 KG/M2 | WEIGHT: 175 LBS | SYSTOLIC BLOOD PRESSURE: 128 MMHG | DIASTOLIC BLOOD PRESSURE: 82 MMHG

## 2019-01-29 DIAGNOSIS — Z34.90 PRENATAL CARE, ANTEPARTUM: ICD-10-CM

## 2019-01-29 DIAGNOSIS — O10.911 CHRONIC HYPERTENSION COMPLICATING OR REASON FOR CARE DURING PREGNANCY, FIRST TRIMESTER: ICD-10-CM

## 2019-01-29 DIAGNOSIS — Z34.81 ENCOUNTER FOR SUPERVISION OF OTHER NORMAL PREGNANCY IN FIRST TRIMESTER: ICD-10-CM

## 2019-01-29 DIAGNOSIS — O24.911 DIABETES MELLITUS AFFECTING PREGNANCY IN FIRST TRIMESTER: ICD-10-CM

## 2019-01-29 DIAGNOSIS — Z12.4 CERVICAL CANCER SCREENING: ICD-10-CM

## 2019-01-29 PROBLEM — O24.913 DIABETES MELLITUS AFFECTING PREGNANCY IN THIRD TRIMESTER: Status: RESOLVED | Noted: 2017-10-16 | Resolved: 2019-01-29

## 2019-01-29 LAB
APPEARANCE UR: NORMAL
BILIRUB UR STRIP-MCNC: NORMAL MG/DL
COLOR UR AUTO: NORMAL
GLUCOSE UR STRIP.AUTO-MCNC: 100 MG/DL
INT CON NEG: NEGATIVE
INT CON POS: POSITIVE
KETONES UR STRIP.AUTO-MCNC: NORMAL MG/DL
LEUKOCYTE ESTERASE UR QL STRIP.AUTO: NORMAL
NITRITE UR QL STRIP.AUTO: NEGATIVE
PH UR STRIP.AUTO: 7 [PH] (ref 5–8)
POC URINE PREGNANCY TEST: POSITIVE
PROT UR QL STRIP: NORMAL MG/DL
RBC UR QL AUTO: NEGATIVE
SP GR UR STRIP.AUTO: 1.02
UROBILINOGEN UR STRIP-MCNC: NORMAL MG/DL

## 2019-01-29 PROCEDURE — 90040 PR PRENATAL FOLLOW UP: CPT | Performed by: ADVANCED PRACTICE MIDWIFE

## 2019-01-29 PROCEDURE — 88175 CYTOPATH C/V AUTO FLUID REDO: CPT

## 2019-01-29 PROCEDURE — 87591 N.GONORRHOEAE DNA AMP PROB: CPT

## 2019-01-29 PROCEDURE — 87491 CHLMYD TRACH DNA AMP PROBE: CPT

## 2019-01-29 PROCEDURE — 81002 URINALYSIS NONAUTO W/O SCOPE: CPT | Performed by: ADVANCED PRACTICE MIDWIFE

## 2019-01-29 PROCEDURE — 81025 URINE PREGNANCY TEST: CPT | Performed by: ADVANCED PRACTICE MIDWIFE

## 2019-01-29 NOTE — PROGRESS NOTES
Subjective:   Angelina Cox is a 28 y.o.  who presents for her new OB exam.  She is 12w1d with an JATINDER of Estimated Date of Delivery: 19 by US. She is feeling well and has no concerns at this time. Denies VB, LOF, contractions or pain. No ER visits or previous care in this pregnancy. Denies dysuria, vaginal DC, fever. Reports absent fetal movement. Too early for AFP.  Declines CF.  Stopped metformin 18 when she had ultrasound [facility]. She stopped labetolol due to running out 2 months ago. Was on labetolol in last pregnancy due to high blood pressure that progressed to preeclampsia. She continued postpartum as well. Was put on metformin by PCP. Not currently checking blood. Was on insulin on last pregnancy.    Past Medical History:   Diagnosis Date   • Diabetes (HCC)    • Hypertension        Psych Hx: Patient denies any history of depression, anxiety, PTSD, bipolar or any other psychological issues.     Past Surgical History:   Procedure Laterality Date   • PRIMARY C SECTION  2017    Procedure: PRIMARY C SECTION;  Surgeon: Goran Webb M.D.;  Location: LABOR AND DELIVERY;  Service: Gynecology        OB History    Para Term  AB Living   2 1   1   1   SAB TAB Ectopic Molar Multiple Live Births             1      # Outcome Date GA Lbr Augustus/2nd Weight Sex Delivery Anes PTL Lv   2 Current            1  17 32w2d  1.43 kg (3 lb 2.4 oz) F CS-Classical Spinal Y LOLI      Birth Comments: Pt had preeclapmsia            Gynecological Hx: Denies any hx of STIs, including HSV. Denies any vulvovaginal disorders and no hx of abnormal cervical cytology. Last pap     Sexual Hx: One current male partner, who is  FOB     Contraceptive Hx: Has used pills in the past and has since discontinued use.     Family History   Problem Relation Age of Onset   • Hypertension Mother      Denies any genetic disorders in family history.     Social History     Social History   • Marital  status:      Spouse name: N/A   • Number of children: N/A   • Years of education: N/A     Occupational History   • Not on file.     Social History Main Topics   • Smoking status: Never Smoker   • Smokeless tobacco: Never Used   • Alcohol use No   • Drug use: No   • Sexual activity: Yes     Partners: Male      Comment: planned pregnancy      Other Topics Concern   • Not on file     Social History Narrative   • No narrative on file       FOB is involved and lives with Angelina Cox.  Pregnancy is surprise but is desired.    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   Denies alcohol use, drug use, or tobacco use in pregnancy.   Denies any current or hx of sexual, emotional or physical abuse or trauma.     Current Medications: PNV   Allergies: Denies allergies to medications, food, or environmental allergies    Objective:      Vitals:    19 0941   BP: 128/82   Weight: 79.4 kg (175 lb)        See Prenatal Physical and Prenatal Vitals  UA WNL today      Assessment:      1.  IUP @ 12w1d per US      2.  S=D      3.  See problem list as follows       Patient Active Problem List    Diagnosis Date Noted   • History of  delivery 12/15/2017   • Essential hypertension 10/19/2017         Plan:   -  GC/CT & pap done today   - Prenatal labs ordered - A1C and PIH labs added for patient.   - Discussed PNV, nutrition, adequate water intake, and exercise/weight gain in pregnancy. Patient will start checking her blood sugar in AM and 2 hr postprandial.   - NOB informational packet with anticipatory guidance given  - Reviewed Centering Pregnancy and patient not candidate.   - Discussed guidelines on hypertension in detail with patient that include not restarting anti hypertensives unless BP >160/110, doing baseline lab work, as well as starting baby aspirin daily. She will  follow up with physician in 2 weeks to determine additional plan of care. She does desire repeat C/S  - Complete OB US in 8 wks  - Return to clinic in 2 weeks.

## 2019-01-29 NOTE — LETTER
Cystic Fibrosis Carrier Testing  Angelina Cox    The following information is about a blood test that can be done to determine if you and/or your partner carry the gene for cystic fibrosis.    WHAT IS CYSTIC FIBROSIS?  · Cystic fibrosis (CF) is an inherited disease that affects more than 25,000 American children and young adults.  · Symptoms of CF vary but include lung congestion, pneumonia, diarrhea and poor growth.  Most people with CF have severe medical problems and some die at a young age.  Others have so few symptoms they are unaware they have CF.  · CF does not affect intelligence.  · Although there is no cure for CF at this time, scientists are making progress in improving treatment and in searching for a cure.  In the past many people with CF  at a very young age.  Today, many are living into their 20’s and 30’s.    IS THERE A CHANCE MY BABY COULD HAVE CYSTIC FIBROSIS?  · You can have a child with CF even if there is no history in your family (see chart below).  · CF testing can help determine if you are a carrier and at risk to have a child with CF.  Note: if both parents are carriers, there is a 1 in 4 (25%) chance with each pregnancy that they will have a child with CF.  · Carriers have one normal CF gene and one altered CF gene.  · People with CF have two altered CF genes.  · Most people have two normal copies of the CF gene.    Approximate risk that a couple with no family history of cystic fibrosis will have a child with cystic fibrosis:    Ethnic background / Risk     couple:  1 in 2,500   couple:  1 in 15,000            couple:  1 in 8,000     American couple:  1 in 32,000     WHAT TESTING IS AVAILABLE?  · There is a blood test that can be done to find out if you or your partner is a carrier.  · It is important to understand that CF carrier testing does not detect all CF carriers.  · If the test shows that you are both CF carriers, you unborn baby can  be tested to find out if the baby has CF.    HOW MUCH DOES IT COST TO HAVE CYSTIC FIBROSIS CARRIER TESTING?  · Cost and insurance coverage for CF carrier testing vary depending upon the laboratory used and your insurance policy.  · The average cost for CF carrier testing is $300 per person.  · Your genetic counselor can provide you with more information about cystic fibrosis carrier testing.    _____  Yes, I am interested in discussing carrier testing with a genetic counselor.    _____  No, I am not interested in CF carrier testing or in receiving more information about CF carrier testing.      Client signature: ________________________________________  1/29/2019

## 2019-01-29 NOTE — PROGRESS NOTES
NOB today  LMP: unknown   Patient is taking PNV   Last pap: due for pap  Chaperone offered and patient declined.  Phone # [phone]  Pharmacy confirmed  C/o:patient stop taking her Metformin thinking its harmful to the baby   Per patient she had a  at the crisis center and was told on 12/24/2018 she was 7 weeks 0 days.

## 2019-01-30 DIAGNOSIS — Z12.4 CERVICAL CANCER SCREENING: ICD-10-CM

## 2019-01-31 ENCOUNTER — HOSPITAL ENCOUNTER (OUTPATIENT)
Dept: LAB | Facility: MEDICAL CENTER | Age: 29
End: 2019-01-31
Attending: ADVANCED PRACTICE MIDWIFE
Payer: MEDICAID

## 2019-01-31 DIAGNOSIS — O10.911 CHRONIC HYPERTENSION COMPLICATING OR REASON FOR CARE DURING PREGNANCY, FIRST TRIMESTER: ICD-10-CM

## 2019-01-31 DIAGNOSIS — Z34.90 PRENATAL CARE, ANTEPARTUM: ICD-10-CM

## 2019-01-31 DIAGNOSIS — O24.911 DIABETES MELLITUS AFFECTING PREGNANCY IN FIRST TRIMESTER: ICD-10-CM

## 2019-01-31 LAB
ABO GROUP BLD: NORMAL
ALBUMIN SERPL BCP-MCNC: 3.9 G/DL (ref 3.2–4.9)
ALBUMIN/GLOB SERPL: 1.5 G/DL
ALP SERPL-CCNC: 48 U/L (ref 30–99)
ALT SERPL-CCNC: 24 U/L (ref 2–50)
ANION GAP SERPL CALC-SCNC: 6 MMOL/L (ref 0–11.9)
APPEARANCE UR: CLEAR
AST SERPL-CCNC: 21 U/L (ref 12–45)
BACTERIA #/AREA URNS HPF: ABNORMAL /HPF
BASOPHILS # BLD AUTO: 0.7 % (ref 0–1.8)
BASOPHILS # BLD: 0.1 K/UL (ref 0–0.12)
BILIRUB SERPL-MCNC: 0.5 MG/DL (ref 0.1–1.5)
BILIRUB UR QL STRIP.AUTO: NEGATIVE
BLD GP AB SCN SERPL QL: NORMAL
BUN SERPL-MCNC: 10 MG/DL (ref 8–22)
C TRACH DNA GENITAL QL NAA+PROBE: NEGATIVE
CALCIUM SERPL-MCNC: 9.1 MG/DL (ref 8.5–10.5)
CHLORIDE SERPL-SCNC: 105 MMOL/L (ref 96–112)
CO2 SERPL-SCNC: 23 MMOL/L (ref 20–33)
COLOR UR: YELLOW
CREAT SERPL-MCNC: 0.69 MG/DL (ref 0.5–1.4)
CYTOLOGY REG CYTOL: NORMAL
EOSINOPHIL # BLD AUTO: 0.14 K/UL (ref 0–0.51)
EOSINOPHIL NFR BLD: 1 % (ref 0–6.9)
EPI CELLS #/AREA URNS HPF: ABNORMAL /HPF
ERYTHROCYTE [DISTWIDTH] IN BLOOD BY AUTOMATED COUNT: 42.8 FL (ref 35.9–50)
EST. AVERAGE GLUCOSE BLD GHB EST-MCNC: 166 MG/DL
GLOBULIN SER CALC-MCNC: 2.6 G/DL (ref 1.9–3.5)
GLUCOSE SERPL-MCNC: 94 MG/DL (ref 65–99)
GLUCOSE UR STRIP.AUTO-MCNC: NEGATIVE MG/DL
HBA1C MFR BLD: 7.4 % (ref 0–5.6)
HBV SURFACE AG SER QL: NEGATIVE
HCT VFR BLD AUTO: 40.9 % (ref 37–47)
HGB BLD-MCNC: 13.5 G/DL (ref 12–16)
HIV 1+2 AB+HIV1 P24 AG SERPL QL IA: NON REACTIVE
HYALINE CASTS #/AREA URNS LPF: ABNORMAL /LPF
IMM GRANULOCYTES # BLD AUTO: 0.06 K/UL (ref 0–0.11)
IMM GRANULOCYTES NFR BLD AUTO: 0.4 % (ref 0–0.9)
KETONES UR STRIP.AUTO-MCNC: NEGATIVE MG/DL
LDH SERPL L TO P-CCNC: 229 U/L (ref 107–266)
LEUKOCYTE ESTERASE UR QL STRIP.AUTO: ABNORMAL
LYMPHOCYTES # BLD AUTO: 2.86 K/UL (ref 1–4.8)
LYMPHOCYTES NFR BLD: 21 % (ref 22–41)
MCH RBC QN AUTO: 29.1 PG (ref 27–33)
MCHC RBC AUTO-ENTMCNC: 33 G/DL (ref 33.6–35)
MCV RBC AUTO: 88.1 FL (ref 81.4–97.8)
MICRO URNS: ABNORMAL
MONOCYTES # BLD AUTO: 0.8 K/UL (ref 0–0.85)
MONOCYTES NFR BLD AUTO: 5.9 % (ref 0–13.4)
N GONORRHOEA DNA GENITAL QL NAA+PROBE: NEGATIVE
NEUTROPHILS # BLD AUTO: 9.64 K/UL (ref 2–7.15)
NEUTROPHILS NFR BLD: 71 % (ref 44–72)
NITRITE UR QL STRIP.AUTO: NEGATIVE
NRBC # BLD AUTO: 0 K/UL
NRBC BLD-RTO: 0 /100 WBC
PH UR STRIP.AUTO: 7 [PH]
PLATELET # BLD AUTO: 300 K/UL (ref 164–446)
PMV BLD AUTO: 10 FL (ref 9–12.9)
POTASSIUM SERPL-SCNC: 4.2 MMOL/L (ref 3.6–5.5)
PROT SERPL-MCNC: 6.5 G/DL (ref 6–8.2)
PROT UR QL STRIP: NEGATIVE MG/DL
RBC # BLD AUTO: 4.64 M/UL (ref 4.2–5.4)
RBC # URNS HPF: ABNORMAL /HPF
RBC UR QL AUTO: NEGATIVE
RH BLD: NORMAL
RUBV AB SER QL: 34.8 IU/ML
SODIUM SERPL-SCNC: 134 MMOL/L (ref 135–145)
SP GR UR STRIP.AUTO: 1.01
SPECIMEN SOURCE: NORMAL
TREPONEMA PALLIDUM IGG+IGM AB [PRESENCE] IN SERUM OR PLASMA BY IMMUNOASSAY: NON REACTIVE
URATE SERPL-MCNC: 3.4 MG/DL (ref 1.9–8.2)
UROBILINOGEN UR STRIP.AUTO-MCNC: 0.2 MG/DL
WBC # BLD AUTO: 13.6 K/UL (ref 4.8–10.8)
WBC #/AREA URNS HPF: ABNORMAL /HPF

## 2019-01-31 PROCEDURE — 86762 RUBELLA ANTIBODY: CPT

## 2019-01-31 PROCEDURE — 36415 COLL VENOUS BLD VENIPUNCTURE: CPT

## 2019-01-31 PROCEDURE — 83615 LACTATE (LD) (LDH) ENZYME: CPT

## 2019-01-31 PROCEDURE — 86900 BLOOD TYPING SEROLOGIC ABO: CPT

## 2019-01-31 PROCEDURE — 86780 TREPONEMA PALLIDUM: CPT

## 2019-01-31 PROCEDURE — 87086 URINE CULTURE/COLONY COUNT: CPT

## 2019-01-31 PROCEDURE — 84550 ASSAY OF BLOOD/URIC ACID: CPT

## 2019-01-31 PROCEDURE — 87389 HIV-1 AG W/HIV-1&-2 AB AG IA: CPT

## 2019-01-31 PROCEDURE — 81001 URINALYSIS AUTO W/SCOPE: CPT

## 2019-01-31 PROCEDURE — 80053 COMPREHEN METABOLIC PANEL: CPT

## 2019-01-31 PROCEDURE — 86901 BLOOD TYPING SEROLOGIC RH(D): CPT

## 2019-01-31 PROCEDURE — 87340 HEPATITIS B SURFACE AG IA: CPT

## 2019-01-31 PROCEDURE — 86850 RBC ANTIBODY SCREEN: CPT

## 2019-01-31 PROCEDURE — 85025 COMPLETE CBC W/AUTO DIFF WBC: CPT

## 2019-01-31 PROCEDURE — 83036 HEMOGLOBIN GLYCOSYLATED A1C: CPT

## 2019-02-01 ENCOUNTER — TELEPHONE (OUTPATIENT)
Dept: OBGYN | Facility: CLINIC | Age: 29
End: 2019-02-01

## 2019-02-02 LAB
BACTERIA UR CULT: ABNORMAL
BACTERIA UR CULT: ABNORMAL
SIGNIFICANT IND 70042: ABNORMAL
SITE SITE: ABNORMAL
SOURCE SOURCE: ABNORMAL

## 2019-02-05 ENCOUNTER — HOSPITAL ENCOUNTER (OUTPATIENT)
Facility: MEDICAL CENTER | Age: 29
End: 2019-02-05
Attending: ADVANCED PRACTICE MIDWIFE
Payer: MEDICAID

## 2019-02-05 LAB
PROT 24H UR-MCNC: 82.5 MG/24 HR (ref 30–150)
PROT 24H UR-MRATE: 5 MG/DL (ref 0–15)
SPECIMEN VOL UR: 1650 ML

## 2019-02-05 PROCEDURE — 81050 URINALYSIS VOLUME MEASURE: CPT

## 2019-02-05 PROCEDURE — 84156 ASSAY OF PROTEIN URINE: CPT

## 2019-02-07 ENCOUNTER — TELEPHONE (OUTPATIENT)
Dept: OBGYN | Facility: CLINIC | Age: 29
End: 2019-02-07

## 2019-02-07 NOTE — TELEPHONE ENCOUNTER
----- Message from Farzad Diaz C.N.M. sent at 2/4/2019  8:23 AM PST -----  Next pap in 2022; will treat yeast as this is likely related to elevated blood sugar. Patient to take OTC Monistat    2/7/19 1125 Pt notified, verbalized understanding and had no questions

## 2019-02-12 ENCOUNTER — ROUTINE PRENATAL (OUTPATIENT)
Dept: OBGYN | Facility: CLINIC | Age: 29
End: 2019-02-12
Payer: MEDICAID

## 2019-02-12 VITALS — WEIGHT: 177 LBS | BODY MASS INDEX: 34.57 KG/M2 | SYSTOLIC BLOOD PRESSURE: 130 MMHG | DIASTOLIC BLOOD PRESSURE: 84 MMHG

## 2019-02-12 DIAGNOSIS — Z98.891 HISTORY OF CESAREAN DELIVERY: ICD-10-CM

## 2019-02-12 DIAGNOSIS — O24.319 MODIFIED WHITE CLASS C PREGESTATIONAL DIABETES MELLITUS: ICD-10-CM

## 2019-02-12 PROCEDURE — 90040 PR PRENATAL FOLLOW UP: CPT | Performed by: OBSTETRICS & GYNECOLOGY

## 2019-02-12 RX ORDER — METRONIDAZOLE 500 MG/1
500 TABLET ORAL 2 TIMES DAILY
Qty: 14 TAB | Refills: 0 | Status: SHIPPED | OUTPATIENT
Start: 2019-02-12 | End: 2019-02-19

## 2019-02-12 NOTE — PROGRESS NOTES
Pt here for OB f/u. Denies any complications today. Reports small FM. Pt requesting test strips for glucometer-Rx pended.   Good phone# [phone]  Pharmacy verified with pt.  Notes recorded by RUPINDER Castillo on 2/2/2019 at 8:59 PM PST  Flagyl 500mg BID x 7 days  Pt notified of UTI and needs to start on antibiotics, instructions given. Advised pt to increase water intake to minimum of 4 lt of water a day. Rx sent by provider. Pharmacy verified with pt.   GDM schedule for 2/19/19 at 0830 check in at 0815 at [facility]. GDM f/u with MD 2/21/19. Pt agreed.

## 2019-02-12 NOTE — PROGRESS NOTES
S: Pt is a class C diabetic with a history of  at 32 weeks for preeclampsia who presents for routine OB follow up.  No contractions, vaginal bleeding, or leaking fluids. Good fetal movement.  She was previously on an unknown dose of metformin and 100 mg daily of labetalol but after she found out she was pregnant she stopped all her medications.  She has not performed any fingersticks due to running out of test strips.    O: /84   Wt 80.3 kg (177 lb)   BMI 34.57 kg/m²   Patients' weight gain, fluid intake and exercise level discussed.  Vitals, fundal height , fetal position, and FHR reviewed on flowsheet    SVE: deferred  FH: 145bpm  Fundus: 14cm    Lab:  Recent Results (from the past 336 hour(s))   HEMOGLOBIN A1C    Collection Time: 19 12:17 PM   Result Value Ref Range    Glycohemoglobin 7.4 (H) 0.0 - 5.6 %    Est Avg Glucose 166 mg/dL   COMP METABOLIC PANEL    Collection Time: 19 12:17 PM   Result Value Ref Range    Sodium 134 (L) 135 - 145 mmol/L    Potassium 4.2 3.6 - 5.5 mmol/L    Chloride 105 96 - 112 mmol/L    Co2 23 20 - 33 mmol/L    Anion Gap 6.0 0.0 - 11.9    Glucose 94 65 - 99 mg/dL    Bun 10 8 - 22 mg/dL    Creatinine 0.69 0.50 - 1.40 mg/dL    Calcium 9.1 8.5 - 10.5 mg/dL    AST(SGOT) 21 12 - 45 U/L    ALT(SGPT) 24 2 - 50 U/L    Alkaline Phosphatase 48 30 - 99 U/L    Total Bilirubin 0.5 0.1 - 1.5 mg/dL    Albumin 3.9 3.2 - 4.9 g/dL    Total Protein 6.5 6.0 - 8.2 g/dL    Globulin 2.6 1.9 - 3.5 g/dL    A-G Ratio 1.5 g/dL   LDH    Collection Time: 19 12:17 PM   Result Value Ref Range    LDH Total 229 107 - 266 U/L   URIC ACID    Collection Time: 19 12:17 PM   Result Value Ref Range    Uric Acid 3.4 1.9 - 8.2 mg/dL   OP PRENATAL PANEL-BLOOD BANK    Collection Time: 19 12:17 PM   Result Value Ref Range    ABO Grouping Only O     Rh Grouping Only POS     Antibody Screen Scrn NEG    ESTIMATED GFR    Collection Time: 19 12:17 PM   Result Value Ref Range    GFR  If African American >60 >60 mL/min/1.73 m 2    GFR If Non African American >60 >60 mL/min/1.73 m 2   PREG CNTR PRENATAL PN    Collection Time: 01/31/19  1:28 PM   Result Value Ref Range    Color Yellow     Character Clear     Specific Gravity 1.012 <1.035    Ph 7.0 5.0 - 8.0    Glucose Negative Negative mg/dL    Ketones Negative Negative mg/dL    Protein Negative Negative mg/dL    Bilirubin Negative Negative    Urobilinogen, Urine 0.2 Negative    Nitrite Negative Negative    Leukocyte Esterase Large (A) Negative    Occult Blood Negative Negative    Micro Urine Req Microscopic     WBC 13.6 (H) 4.8 - 10.8 K/uL    RBC 4.64 4.20 - 5.40 M/uL    Hemoglobin 13.5 12.0 - 16.0 g/dL    Hematocrit 40.9 37.0 - 47.0 %    MCV 88.1 81.4 - 97.8 fL    MCH 29.1 27.0 - 33.0 pg    MCHC 33.0 (L) 33.6 - 35.0 g/dL    RDW 42.8 35.9 - 50.0 fL    Platelet Count 300 164 - 446 K/uL    MPV 10.0 9.0 - 12.9 fL    Neutrophils-Polys 71.00 44.00 - 72.00 %    Lymphocytes 21.00 (L) 22.00 - 41.00 %    Monocytes 5.90 0.00 - 13.40 %    Eosinophils 1.00 0.00 - 6.90 %    Basophils 0.70 0.00 - 1.80 %    Immature Granulocytes 0.40 0.00 - 0.90 %    Nucleated RBC 0.00 /100 WBC    Neutrophils (Absolute) 9.64 (H) 2.00 - 7.15 K/uL    Lymphs (Absolute) 2.86 1.00 - 4.80 K/uL    Monos (Absolute) 0.80 0.00 - 0.85 K/uL    Eos (Absolute) 0.14 0.00 - 0.51 K/uL    Baso (Absolute) 0.10 0.00 - 0.12 K/uL    Immature Granulocytes (abs) 0.06 0.00 - 0.11 K/uL    NRBC (Absolute) 0.00 K/uL    Hepatitis B Surface Antigen Negative Negative    Rubella IgG Antibody 34.80 IU/mL    Syphilis, Treponemal Qual Non Reactive Non Reactive   HIV AG/AB COMBO ASSAY SCREENING    Collection Time: 01/31/19  1:28 PM   Result Value Ref Range    HIV Ag/Ab Combo Assay Non Reactive Non Reactive   URINE CULTURE(NEW)    Collection Time: 01/31/19  1:28 PM   Result Value Ref Range    Significant Indicator POS (POS)     Source UR     Site -     Urine Culture Mixed skin ap 10-50,000 cfu/mL (A)     Urine  Culture (A)      Probable Gardnerella vaginalis  ,000 cfu/mL     URINE MICROSCOPIC (W/UA)    Collection Time: 19  1:28 PM   Result Value Ref Range    WBC 10-20 (A) /hpf    RBC 0-2 /hpf    Bacteria Moderate (A) None /hpf    Epithelial Cells Moderate (A) /hpf    Hyaline Cast 3-5 (A) /lpf   URINETOTAL PROTEIN 24 HR    Collection Time: 19 10:00 AM   Result Value Ref Range    Total Volume, Urine 1650 mL    Total Protein, Urine 5.0 0.0 - 15.0 mg/dL    Total Protein, 24 Hour Urine 82.5 30.0 - 150.0 mg/24 Hr       Prenatal labs:  A1C 7.4  T&S: O+  Gardnerella UTI noted on the UA  Hep B: neg  T.Pall: non reactive  Rubella: immune  HIV: non reactive  Pap smear: NILM with fungus  Baseline 24: 82.5  Baseline PIH labs wnl.     Level II: not done yet    A/P:  28 y.o.  at 14w1d based on LMP c/w 12wk US presents for routine obstetric follow-up.   No LMP recorded. Patient is pregnant.  Size equals dates and/or scan  - AFP/tetra today  -Class C diabetes: Advised patient she is to take fasting and one-hour postprandial sugars.  She is to bring her log in at her next visit in order to determine whether or not she is to start medications.  Advised patient on risks of macrosomia, fetal anomalies, DKA, risk of  section with uncontrolled diabetes.  -Would likely restart metformin at 500 mg twice daily if patient's blood sugars are more than 50% elevated  - Delivery plan: repeat  section  - Level II today for 18 weeks  - Continue prenatal vitamins.  -  labor precautions and fetal kick counts   - Increase water intake.  - Follow-up in 4 weeks.

## 2019-02-13 ENCOUNTER — TELEPHONE (OUTPATIENT)
Dept: OBGYN | Facility: CLINIC | Age: 29
End: 2019-02-13

## 2019-02-13 NOTE — TELEPHONE ENCOUNTER
Patient called to make sure we have sent her glucose strips to her pharmacy. Patient confirmed pharmacy with me. No further questions.

## 2019-02-14 ENCOUNTER — TELEPHONE (OUTPATIENT)
Dept: OBGYN | Facility: CLINIC | Age: 29
End: 2019-02-14

## 2019-02-14 NOTE — TELEPHONE ENCOUNTER
Pt called stating she is unable to get her glucose strips due to pharmacy needing a code. Called [facility] pharmacy and had them fax over the request. Paula gave me the ICD 10 code (024.319) and I faxed it back to [facility].

## 2019-02-19 ENCOUNTER — APPOINTMENT (OUTPATIENT)
Dept: HEALTH INFORMATION MANAGEMENT | Facility: MEDICAL CENTER | Age: 29
End: 2019-02-19
Payer: COMMERCIAL

## 2019-02-22 ENCOUNTER — TELEPHONE (OUTPATIENT)
Dept: OBGYN | Facility: CLINIC | Age: 29
End: 2019-02-22

## 2019-02-22 NOTE — TELEPHONE ENCOUNTER
Pt called stating she still has not been able to gt her strips because pharmacy still waiting on code. I called pharmacy, spoke with Jaime and he sated the Rx has been ready for  for 5 days. Notified pt and she had no other questions or concerns

## 2019-02-26 ENCOUNTER — TELEPHONE (OUTPATIENT)
Dept: OBGYN | Facility: CLINIC | Age: 29
End: 2019-02-26

## 2019-02-26 ENCOUNTER — NON-PROVIDER VISIT (OUTPATIENT)
Dept: HEALTH INFORMATION MANAGEMENT | Facility: MEDICAL CENTER | Age: 29
End: 2019-02-26
Payer: MEDICAID

## 2019-02-26 VITALS
SYSTOLIC BLOOD PRESSURE: 123 MMHG | HEART RATE: 88 BPM | BODY MASS INDEX: 34.47 KG/M2 | HEIGHT: 60 IN | DIASTOLIC BLOOD PRESSURE: 81 MMHG | WEIGHT: 175.6 LBS

## 2019-02-26 PROCEDURE — G0109 DIAB MANAGE TRN IND/GROUP: HCPCS | Performed by: INTERNAL MEDICINE

## 2019-02-26 NOTE — PROGRESS NOTES
Angelina received a 3110-2924 calorie meal plan (based on 20 kcal/kg of current weight) consisting of 3 meals and 3 snacks (see media for copy of meal plan).   Pt's prepregnancy weight was: 174lb. She has gained 1.6lb so far in this pregnancy.   Recommended meal times:   B: 9:30am   S: 11:30am   L:2-3pm   D: 6-6:30pm   S: 8:30pm   S: 11:30pm   Works various hours at [employer]. 3 days per week works 12-6pm , Goes to bed late.   Had been checking FSBS and fastings are all high. She needed insulin with her last pregnancy and is trying to avoid needing it again. Explained that it is likely she will need insulin because her diet seems to be well balanced already and given that her A1c was 7.1 and that she required it previously is a good indicator.     Pt was educated on carbohydrate containing foods vs non carbohydrate containing foods, the importance of small frequent meals, limiting carbohydrate to 1 serving in the morning, no fruit before noon/12pm, and avoiding all concentrated sweets for the remainder of her pregnancy. Explained the importance of not going >4hrs btwn eating during the day and no longer than 10hours overnight. Patient agrees to follow the meal plan and guidelines provided.

## 2019-02-26 NOTE — LETTER
February 26, 2019                   Re: Angelina Gonzalez     1990         [id number]       Paula Allison C.N.M.  [address]  [place] [address]      Dear :Paula Allison C*    On 2/26/2019, your patient Angelina Gonzalez, received 1 hour of diabetes education from the Diabetes Center at [facility] for management of her gestational diabetes.  Her EDC is  : 8/12/19.  We taught the following subjects:    Introduction to gestational diabetes, benefits and responsibilities of patient, physiology of diabetes and the diease process, benefits of blood glucose monitoring and record keeping, medication action and possible side effects, hypoglycemia, sick day management, exercise, stress reduction and travel with diabetes.       Nurse assessment / Education:    Comments:    BP:Blood Pressure: 123/81   Edema: No      Weight:Weight: 79.7 kg (175 lb 9.6 oz)         Complaints:No     Pathophysiology of diabetes in pregnancy    Discuss  potential maternal and fetal complications in pregnancy with diabetes.     Importance of blood glucose monitoring   Proper testing technique using a One Touch Verio meter. Pt states this is the meter she has at home.   At 08, the meter read 92, at home which was fasting per patient.  Testing: fasting and one hour after meals,  expected ranges and rationale for strict control.   Urine ketone testing and rationale    Ketone testing:  At the Pregnancy center   Ketone test today:No      Recognition and treatment of hypoglycemia.     Insulin taught: No  Insulin briefly dicussed at this time.    Should patient require insulin later in pregnancy, she would need further education.   Reviewed fetal kick counts and other tests to determine fetal well-being  Discuss benefits and risks of exercise in pregnancy  Discuss when to call Doctor  Discuss sick day care  Importance of wearing diabetes identification    Angelina attended Burmese gestational diabetes class. Pt has a hx of type two  diabetes on Metformin before pregnant. Pt stopped Metformin when found she was pregnant. Pt has a One touch verio meter and was told to test blood sugars fasting and 1 hour pp. She states her fasting blood sugar this am at 08 was 92. She was running low with strips and was having a problem getting them filled. CDE called [facility] in [place]  ([address] [facility]) and confirmed they now have the diagnosis code and are filling the strips. Message left for patient via mobile. Pt 's finger stick results pre class: 2/24: 110, 106, 108,  And 115, 2/25: 112, 152, 120 and 118, and today 2/26: fasting was 92.  Pt is active at work 5 days a week but will attempt activity on days off as well as shana log book. Discussed what she needs to do after delivery for herself ( and her diabetes) as well as for  family to limit risk for type two diabetes. Handouts given in English and Yi but class in Yi. Difference with type 2 and type 2 and pregnancy discussed as well.    Patient/caregiver appeared to understand the content as demonstrated by appropriate questions.     Angelina Gonzalez was encouraged to discuss this further with you.    Hopefully this will help in your management of her care.  If we can be of further assistance, please feel free to call.    Thank you for the referral.    Sincerely,    Antonina Alfredo RN CDE  Certified Diabetes Educator

## 2019-02-26 NOTE — PROGRESS NOTES
Angelina attended Turkmen gestational diabetes class. Pt has a hx of type two diabetes on Metformin before pregnant. Pt stopped Metformin when found she was pregnant. Pt has a One touch verio meter and was told to test blood sugars fasting and 1 hour pp. Pt states her fasting blood sugar at 08 this am was 92. She was told only to bring in log book not meter to class. She was running low with strips and was having a problem getting them filled. CDE called [facility] in [place]  ([address]) and confirmed they now have the diagnosis code and are filling the strips. Message left for patient via mobile. Pt 's finger stick results pre class: 2/24: 110, 106, 108,  And 115, 2/25: 112, 152, 120 and 118, and today 2/26: fasting was 92.  Pt is active at work 5 days a week but will attempt activity on days off as well as shana log book. Discussed what she needs to do after delivery for herself ( and her diabetes) as well as for  family to limit risk for type two diabetes. Handouts given in English and Turkmen but class in Turkmen. Difference with type 2 and type 2 and pregnancy discussed as well.

## 2019-02-26 NOTE — TELEPHONE ENCOUNTER
"Received VM from pt stating she has not been able to  her test strips because they needs a \"code\". I called pharmacy, spoke with Petrona who stated the Dx code was wrong, provided correct code and she states she will get this ready for patient. Called pt but un able to reach her, LM on Vm notifying her.    "

## 2019-02-26 NOTE — LETTER
February 26, 2019                   Re: Angelina Gonzalez     1990         [id number]       Paula Allison C.N.M.  [address]  [address]  [place] [address]      Dear :Paula Allison C*    On 2/26/2019, your patient Angelina Gonzalez, received 1 hours of nutrition training from the Diabetes Center at [facility] for management of her gestational diabetes.  Her EDC is Estimated Date of Delivery: 8/12/19.  We taught the following subjects:    Patient was provided with a 2280-1618 calorie meal plan with 3 meals and 3 snacks.  Meal plan contains 175 grams of carbohydrates per day.   Importance of meal planning in diabetes management during pregnancy  Importance of consistent timing of meals and snacks and agreed upon times  Avoidance of simple carbohydrates  Metabolism of food components relating to pregnancy  Identification of foods in food groups  Patient demonstrates adequate ability to utilize meal planning manual for reference  Plan 3 meals and 3 snacks with 90% accuracy  Review basic principles of eating out  Reviewed precautions with artificial sweeteners    Comments:  Angelina agreed to follow the meal plan and to eat at the times agreed upon.  She will check blood glucose 4 times a day and record the values in her log book.      Hopefully this will help in your management of her care.  If we can be of further assistance, please feel free to call.    Thank you for the referral.    Sincerely,  Prudence Wheeler, RD, LD, CDE  [phone]

## 2019-02-28 ENCOUNTER — ROUTINE PRENATAL (OUTPATIENT)
Dept: OBGYN | Facility: CLINIC | Age: 29
End: 2019-02-28
Payer: MEDICAID

## 2019-02-28 ENCOUNTER — NON-PROVIDER VISIT (OUTPATIENT)
Dept: OBGYN | Facility: CLINIC | Age: 29
End: 2019-02-28
Payer: MEDICAID

## 2019-02-28 VITALS — DIASTOLIC BLOOD PRESSURE: 76 MMHG | SYSTOLIC BLOOD PRESSURE: 108 MMHG | WEIGHT: 175 LBS | BODY MASS INDEX: 34.18 KG/M2

## 2019-02-28 DIAGNOSIS — O24.319 MODIFIED WHITE CLASS C PREGESTATIONAL DIABETES MELLITUS: ICD-10-CM

## 2019-02-28 DIAGNOSIS — I10 ESSENTIAL HYPERTENSION: ICD-10-CM

## 2019-02-28 DIAGNOSIS — O24.410 GDM, CLASS A1: ICD-10-CM

## 2019-02-28 DIAGNOSIS — Z98.891 HISTORY OF CESAREAN DELIVERY: ICD-10-CM

## 2019-02-28 DIAGNOSIS — O24.119 TYPE 2 DIABETES MELLITUS DURING PREGNANCY, ANTEPARTUM: ICD-10-CM

## 2019-02-28 LAB — GLUCOSE BLD-MCNC: 114 MG/DL (ref 70–100)

## 2019-02-28 PROCEDURE — 90040 PR PRENATAL FOLLOW UP: CPT | Performed by: OBSTETRICS & GYNECOLOGY

## 2019-02-28 PROCEDURE — 82962 GLUCOSE BLOOD TEST: CPT | Performed by: OBSTETRICS & GYNECOLOGY

## 2019-02-28 PROCEDURE — 97802 MEDICAL NUTRITION INDIV IN: CPT | Performed by: DIETITIAN, REGISTERED

## 2019-02-28 NOTE — PROGRESS NOTES
Chief complaint: Routine OB visit.      S: Pt here for OB follow up. Doing well.     Patient has history of severe preeclampsia with first pregnancy, underwent primary low transverse  at 32 weeks.  Also history of insulin controlled gestational diabetes with prior pregnancy.    Records showed that patient had a primary low transverse , not a classical  as erroneously documented.  OP note available in epic    Patient planning on repeat     Recent failed diabetes screen.  Just underwent diabetic teaching.  positive fetal movement.    negative vaginal bleeding.    negative leakage of fluid.    negative contractions.    Reviewed blood sugar log with patient.  Reviewed diet.  Questions answered.    O: VSS Afeb      See prenatal vitals, chart for today's exam    FBS range:   1hr post prandial range: .    Insulin regimen  NPH a.m. 0, regular a.m. 0  Regular with dinner 0, NPH at bedtime 0      A/P:  28 y.o.  16w3d with gestational diabetes GDM who presents for obstetric follow-up.    1.  Labor precautions and fetal kick counts educated  2.  No change insulin dosage at this time.  Discussed with the patient that I will give her 1 more week to have her sugars under control with diet changes alone.  If there is no improvement she will be started on insulin at that time.  Hemoglobin A1c 7.4 on   3.  NSTs: 2x/week to begin at 32 weeks if on insulin  4.  Continue prenatal vitamins.  5.  Watch weight gain.  Exercise at least 30 minutes daily  6.  Drink at least 2 liters of water daily.  7.  Encouraged prenatal classes.  8.  Follow-up in 1 weeks for obstetric follow-up.    Discussed the patient use of ASA 81 pregnancy to decrease risk of preeclampsia.  Patient has a history of preeclampsia, remote from term which increases her risk even higher at this time.  The risks are discussed the patient, mainly risk of bleeding (but no fetal loss) and gastroschisis.  Patient  will start    Discussed with the patient quad screen and cystic fibrosis screening.  Patient declined at this time.    Fetal anatomical survey scheduled for March 25.

## 2019-02-28 NOTE — PROGRESS NOTES
OB f/u GDM. Good #[phone]  Maryann PULLIAM  Pt. Denies VB, LOF, or UC's.   Pharmacy verified.  Diabetic supplies pended to Dr. NY in clinic : 114 Pt states last ate at 930 whole wheat bread with peanut butter and sm. milk  Chaperone offered and provided

## 2019-02-28 NOTE — PROGRESS NOTES
Follow Up Nutrition Assessment:   Time: 11:10-11:30am     Subjective:  -Pt just took the GDM class on Tuesday this week and has been following the meal plan  -She is motivated to comply with diet to try to avoid insulin if able   -Had pb toast on wheat bread with milk 8oz last night for HS snack   -Is active at her job in evenings, works late, at [employer]   -Pt is going on a trip this weekend and is worried about how to follow the meal plan while she is there     Objective:   Anthropometrics:   Today's Weight: 175lb    Pre-pregnancy weight: 174lb   Total weight gain: +1lb   Weeks gestation: 16w3d    Biochemical data, medical test and procedures:  Lab Results   Component Value Date/Time    POCGLUCOSE 114 (A) 02/28/2019 11:03 AM       Glucose Log Book (most recent):    Fasting glucose range: 90, 83    1 hour post prandial glucose range: 179, 113, 94, 130   Only noting FSBS since pt was given meal plan. However she was checking FSBS daily for a few weeks     Assessment:   Recommended Diet:  6995-4627 calorie meal plan (see media for detailed meal plan)    Assessment Notes:   Reviewed diet hx from the past 2 days with pt in detail. She is going to continue to have pb toast for HS snack and was encouraged to reduce or eliminate milk if fastings increase back to to >90. Suggested other snacks as well that should help keep fasting <90.   Also discussed tips for how to follow meal plan on up coming trip.   Showed pt calorie [email or web address] for chain restaurants and label reading.     Plan: Monitoring & Evaluation  Goals:  1. Follow meal plan as outlined above; 3 meals and 3 snacks daily.   2. Check FSBS 4 times a day  (fasting, and 1 hour after each meal)  3. FSBS Goals= Fasting <90; 1 hour PP <130   4. Bring blood sugar log book to each appointment   5. Appropriate weight gain during pregnancy  6. May need to start insulin soon if diet is not enough to keep fasting FSBS <90   7. Continue high protein HS snack       Follow up 1 week    Prudence Wheeler, RD, LD, CDE  [phone]

## 2019-03-07 ENCOUNTER — NON-PROVIDER VISIT (OUTPATIENT)
Dept: OBGYN | Facility: CLINIC | Age: 29
End: 2019-03-07
Payer: MEDICAID

## 2019-03-07 ENCOUNTER — ROUTINE PRENATAL (OUTPATIENT)
Dept: OBGYN | Facility: CLINIC | Age: 29
End: 2019-03-07
Payer: MEDICAID

## 2019-03-07 VITALS — WEIGHT: 175 LBS | SYSTOLIC BLOOD PRESSURE: 110 MMHG | DIASTOLIC BLOOD PRESSURE: 68 MMHG | BODY MASS INDEX: 34.18 KG/M2

## 2019-03-07 DIAGNOSIS — O09.92 HIGH-RISK PREGNANCY IN SECOND TRIMESTER: ICD-10-CM

## 2019-03-07 DIAGNOSIS — O24.119 TYPE 2 DIABETES MELLITUS DURING PREGNANCY, ANTEPARTUM: ICD-10-CM

## 2019-03-07 DIAGNOSIS — I10 ESSENTIAL HYPERTENSION: ICD-10-CM

## 2019-03-07 DIAGNOSIS — O24.319 MODIFIED WHITE CLASS C PREGESTATIONAL DIABETES MELLITUS: ICD-10-CM

## 2019-03-07 PROCEDURE — 90471 IMMUNIZATION ADMIN: CPT | Performed by: OBSTETRICS & GYNECOLOGY

## 2019-03-07 PROCEDURE — 97803 MED NUTRITION INDIV SUBSEQ: CPT | Performed by: DIETITIAN, REGISTERED

## 2019-03-07 PROCEDURE — 90686 IIV4 VACC NO PRSV 0.5 ML IM: CPT | Performed by: OBSTETRICS & GYNECOLOGY

## 2019-03-07 PROCEDURE — 90040 PR PRENATAL FOLLOW UP: CPT | Performed by: OBSTETRICS & GYNECOLOGY

## 2019-03-07 NOTE — PROGRESS NOTES
Follow Up Nutrition Assessment:   Time: 8:35-8:49am   Referring Provider: Luca Jaime     Subjective:  -Angelina said she did well with following the meal plan while she was traveling last weekend  -Has been mindful of her carb intake. No sweets   =Eats 3 meals and 3 snacks   -HS snack is toast with PB and milk q night , yet fastings vary   -Pt stopped metformin on her own when she found out she was pregnant     Objective:   Anthropometrics:   Today's Weight: 175lb    Pre-pregnancy weight: 174lb   Total weight gain: +1lb   Weeks gestation: 17w3d    Biochemical data, medical test and procedures:  Lab Results   Component Value Date/Time    POCGLUCOSE 114 (A) 02/28/2019 11:03 AM       Glucose Log Book (most recent):    Fasting glucose range:    1 hour post prandial glucose range:      Assessment:   Recommended Diet:  5802-5112 calorie meal plan (see media for detailed meal plan)    Assessment Notes:   Recommended angelina try leaving milk out her her HS snack to see if reducing CHO servings to 1 with 2oz protein helps lower her fasting number. If not, she can try other high protein snacks at that time to optimize fasting glucose level. She was started back on metformin today as well. She had a few high post prandial readings but very few and she is learning to adjust diet well.      Plan: Monitoring & Evaluation  Goals:  1. Follow meal plan as outlined above; 3 meals and 3 snacks daily.   2. Check FSBS 4 times a day  (fasting, and 1 hour after each meal)  3. FSBS Goals= Fasting <90; 1 hour PP <130   4. Bring blood sugar log book to each appointment   5. Appropriate weight gain during pregnancy  6. Try to leave out milk at HS snack (1CHO w/ 2oz protein)        Follow up 1-2 weeks   Prudence Wheeler, RD, LD, CDE  [phone]

## 2019-03-07 NOTE — PROGRESS NOTES
CONY:  17w3d    Pt reports doing well.  Reports denies vaginal bleeding, contractions, LOF.  Feeling well    BG lo/4 fasting >95  PP: 1 above 140    /68   Wt 79.4 kg (175 lb)   BMI 34.18 kg/m²   gen: AAO, NAD  FHTs: 145  FH: below umbilicus    A/P: 28 y.o.  @ 17w3d by 7wk US hx of LTCS c/s at 32wks w/ preEclampsia  - PNL up to date, Rh+/-; anatomy US scheduled  - declines AFP  - DM II: previously on metformin, she stopped with pregnancy initiation  - hx of preE w/ severe features requiring delivery at 32wks; on ASA 81mg daily, baselien preE labs wnl (24hr urine 82.5mg)  - cHTN: no meds  - flu shot today    RTC 2wks    Emi Garcia MD  [facility], Women's [facility]

## 2019-03-07 NOTE — PROGRESS NOTES
Pt here today for OB follow up  Pt states no complaints   Reports +FM  Good # [phone]  Pharmacy Confirmed.  Pt declined AFP. Refusal signed.    Pt would like Flu.   Flu vaccine given. left Deltoid. VIS given and screening check list reviewed with

## 2019-03-21 ENCOUNTER — ROUTINE PRENATAL (OUTPATIENT)
Dept: OBGYN | Facility: CLINIC | Age: 29
End: 2019-03-21
Payer: MEDICAID

## 2019-03-21 ENCOUNTER — NON-PROVIDER VISIT (OUTPATIENT)
Dept: OBGYN | Facility: CLINIC | Age: 29
End: 2019-03-21
Payer: MEDICAID

## 2019-03-21 VITALS — BODY MASS INDEX: 33.98 KG/M2 | WEIGHT: 174 LBS | SYSTOLIC BLOOD PRESSURE: 112 MMHG | DIASTOLIC BLOOD PRESSURE: 64 MMHG

## 2019-03-21 DIAGNOSIS — O09.92 HIGH-RISK PREGNANCY SUPERVISION, SECOND TRIMESTER: ICD-10-CM

## 2019-03-21 DIAGNOSIS — O24.119 TYPE 2 DIABETES MELLITUS DURING PREGNANCY, ANTEPARTUM: ICD-10-CM

## 2019-03-21 DIAGNOSIS — I10 ESSENTIAL HYPERTENSION: ICD-10-CM

## 2019-03-21 DIAGNOSIS — O24.319 MODIFIED WHITE CLASS C PREGESTATIONAL DIABETES MELLITUS: ICD-10-CM

## 2019-03-21 DIAGNOSIS — Z98.891 HISTORY OF CESAREAN DELIVERY: ICD-10-CM

## 2019-03-21 LAB — GLUCOSE BLD-MCNC: 102 MG/DL (ref 70–100)

## 2019-03-21 PROCEDURE — 97803 MED NUTRITION INDIV SUBSEQ: CPT | Performed by: DIETITIAN, REGISTERED

## 2019-03-21 PROCEDURE — 82962 GLUCOSE BLOOD TEST: CPT | Performed by: OBSTETRICS & GYNECOLOGY

## 2019-03-21 PROCEDURE — 90040 PR PRENATAL FOLLOW UP: CPT | Performed by: OBSTETRICS & GYNECOLOGY

## 2019-03-21 NOTE — PROGRESS NOTES
Follow Up Nutrition Assessment:   Time: 8:30-8:45am     Subjective:  -Angelina states she has been working on her diet , eating more vegetables   -She is drinking milk 2% or whole, for her HS snack   -Started metformin a few weeks ago   -Lunch-- she noticed some of her post prandials were high when she ate pasta but has changed this and is eating other things now     Objective:   Anthropometrics:   Today's Weight: 174lb    Pre-pregnancy weight: 174lb  Total weight gain: 0lb   Weeks gestation: 19w3d    Biochemical data, medical test and procedures:  Lab Results   Component Value Date/Time    POCGLUCOSE 102 (A) 03/21/2019 08:35 AM       Glucose Log Book (most recent):  x3 weeks    Fasting glucose range:  (2 elevated)    1 hour post prandial glucose range:  (5 elevated, mostly after lunch)     Assessment:   Recommended Diet:  8364-3300 calorie meal plan (see media for detailed meal plan)    Assessment Notes:   Angelina is doing well. Her fasting have come down and are now WNL with diet and metformin. No changes were suggested today however encouraged pt to be cautious with portion of carbs at lunch and to avoid white pasta.     Plan: Monitoring & Evaluation  Goals:  1. Follow meal plan as outlined above; 3 meals and 3 snacks daily.   2. Check FSBS 4 times a day  (fasting, and 1 hour after each meal)  3. FSBS Goals= Fasting <90; 1 hour PP <130   4. Bring blood sugar log book to each appointment   5. Appropriate weight gain during pregnancy         Follow up 2 weeks   Prudence Wheeler RD, LD, CDE  [phone]

## 2019-03-21 NOTE — PROGRESS NOTES
OB f/u GDM. Good # [phone]  U/S on 3/25/19  Good FM   Pt states no complaints.   Pharmacy verified.  Diabetic supplies  BS in clinic : 102 Pt states last ate at 7:30 am

## 2019-03-21 NOTE — PROGRESS NOTES
S: Pt here for OB follow up. Doing well.   Reports positive fetal movement.    negative vaginal bleeding.    negative leakage of fluid.    negative contractions.    Reviewed blood sugar log with patient.  Reviewed diet.  Questions answered.    O: VSS Afeb      See prenatal vitals, chart for today's exam    FBS range:70-89  1hr post prandial range:102-145    Insulin regimen: None    Metformin 500 mg BID      A/P:  28 y.o.  19w3d with Class C DM who presents for obstetric follow-up.  Sugars are well controlled currently on metformin.  We will continue current dosages of metformin and reevaluate in 2 weeks    1.  Labor precautions and fetal kick counts educated  2.  Continue current Metformin diet and fingersticks   3.  NSTs: 2x/week to begin at 32 weeks.  4.  Continue prenatal vitamins.  5.  Watch weight gain.  Exercise at least 30 minutes daily  6.  Drink at least 2 liters of water daily.  7.  Encouraged prenatal classes.  8.  Follow-up in 2 weeks for obstetric follow-up.  9.  Precautions and plan of care reviewed  10.  Patient has appointment for level 2 ultrasound

## 2019-04-04 ENCOUNTER — ROUTINE PRENATAL (OUTPATIENT)
Dept: OBGYN | Facility: CLINIC | Age: 29
End: 2019-04-04
Payer: MEDICAID

## 2019-04-04 VITALS — WEIGHT: 177 LBS | BODY MASS INDEX: 34.57 KG/M2 | DIASTOLIC BLOOD PRESSURE: 62 MMHG | SYSTOLIC BLOOD PRESSURE: 108 MMHG

## 2019-04-04 DIAGNOSIS — Z98.891 HISTORY OF CESAREAN DELIVERY: ICD-10-CM

## 2019-04-04 DIAGNOSIS — O09.92 HIGH-RISK PREGNANCY SUPERVISION, SECOND TRIMESTER: ICD-10-CM

## 2019-04-04 DIAGNOSIS — O24.319 MODIFIED WHITE CLASS C PREGESTATIONAL DIABETES MELLITUS: ICD-10-CM

## 2019-04-04 DIAGNOSIS — I10 ESSENTIAL HYPERTENSION: ICD-10-CM

## 2019-04-04 PROCEDURE — 90040 PR PRENATAL FOLLOW UP: CPT | Performed by: OBSTETRICS & GYNECOLOGY

## 2019-04-04 NOTE — PROGRESS NOTES
Pt here today for OB follow up  Pt states no complaints   Reports +FM  Good # [phone]  Pharmacy Confirmed.  Chaperone offered and declined.   U/S 4/8/19.

## 2019-04-04 NOTE — PROGRESS NOTES
S: Pt here for OB follow up. Doing well.   Reports fetal movement.    negative vaginal bleeding.    negative leakage of fluid.    negative contractions.    Reviewed blood sugar log with patient.  Reviewed diet.  Questions answered.    O: VSS Afeb      See prenatal vitals, chart for today's exam    FBS range: 70-91  1hr post prandial range:     Insulin regimen: None  On Metformin 500 mg bid  Hg A1c 7.4        A/P:  28 y.o.  21w3d with  Class C DM who presents for obstetric follow-up. S=D  Encounter Diagnoses   Name Primary?   • High-risk pregnancy supervision, second trimester    • Modified White class C pregestational diabetes mellitus-currently controlled with metformin    • History of  delivery-32 weeks for severe preeclampsia.repeat  will be done at 39 weeks.patient is on baby aspirin daily    • Essential hypertension-no medication currently in pregnancy.blood pressure is well controlled        1.  Labor precautions and fetal kick counts educated  2.  Continue metformin diet and fingersticks -we will reassess in 2 weeks   3.  NSTs: 2x/week to begin at 32 weeks.  4.  Continue prenatal vitamins.  5.  Watch weight gain.  Exercise at least 30 minutes daily  6.  Drink at least 2 liters of water daily.  7.  Encouraged prenatal classes.  8.  Follow-up in 2 weeks for obstetric follow-up.  9.  Pregnancy precautions discussed.  Repeat hemoglobin A1c ordered

## 2019-04-08 ENCOUNTER — APPOINTMENT (OUTPATIENT)
Dept: RADIOLOGY | Facility: IMAGING CENTER | Age: 29
End: 2019-04-08
Attending: ADVANCED PRACTICE MIDWIFE
Payer: MEDICAID

## 2019-04-08 DIAGNOSIS — Z34.90 PRENATAL CARE, ANTEPARTUM: ICD-10-CM

## 2019-04-08 PROCEDURE — 76805 OB US >/= 14 WKS SNGL FETUS: CPT | Performed by: OBSTETRICS & GYNECOLOGY

## 2019-04-15 ENCOUNTER — HOSPITAL ENCOUNTER (OUTPATIENT)
Dept: LAB | Facility: MEDICAL CENTER | Age: 29
End: 2019-04-15
Attending: OBSTETRICS & GYNECOLOGY
Payer: COMMERCIAL

## 2019-04-15 DIAGNOSIS — O24.319 MODIFIED WHITE CLASS C PREGESTATIONAL DIABETES MELLITUS: ICD-10-CM

## 2019-04-15 DIAGNOSIS — O09.92 HIGH-RISK PREGNANCY SUPERVISION, SECOND TRIMESTER: ICD-10-CM

## 2019-04-15 PROCEDURE — 36415 COLL VENOUS BLD VENIPUNCTURE: CPT

## 2019-04-15 PROCEDURE — 83036 HEMOGLOBIN GLYCOSYLATED A1C: CPT

## 2019-04-16 LAB
EST. AVERAGE GLUCOSE BLD GHB EST-MCNC: 120 MG/DL
HBA1C MFR BLD: 5.8 % (ref 0–5.6)

## 2019-04-18 ENCOUNTER — ROUTINE PRENATAL (OUTPATIENT)
Dept: OBGYN | Facility: CLINIC | Age: 29
End: 2019-04-18
Payer: MEDICAID

## 2019-04-18 VITALS — DIASTOLIC BLOOD PRESSURE: 68 MMHG | WEIGHT: 175 LBS | BODY MASS INDEX: 34.18 KG/M2 | SYSTOLIC BLOOD PRESSURE: 108 MMHG

## 2019-04-18 DIAGNOSIS — Z98.891 HISTORY OF CESAREAN DELIVERY: ICD-10-CM

## 2019-04-18 DIAGNOSIS — O09.292 HX OF PREECLAMPSIA, PRIOR PREGNANCY, CURRENTLY PREGNANT, SECOND TRIMESTER: ICD-10-CM

## 2019-04-18 DIAGNOSIS — O24.319 MODIFIED WHITE CLASS C PREGESTATIONAL DIABETES MELLITUS: ICD-10-CM

## 2019-04-18 DIAGNOSIS — O09.92 HIGH-RISK PREGNANCY SUPERVISION, SECOND TRIMESTER: ICD-10-CM

## 2019-04-18 DIAGNOSIS — I10 ESSENTIAL HYPERTENSION: ICD-10-CM

## 2019-04-18 LAB — GLUCOSE BLD-MCNC: 126 MG/DL (ref 70–100)

## 2019-04-18 PROCEDURE — 82962 GLUCOSE BLOOD TEST: CPT | Performed by: OBSTETRICS & GYNECOLOGY

## 2019-04-18 PROCEDURE — 90040 PR PRENATAL FOLLOW UP: CPT | Performed by: OBSTETRICS & GYNECOLOGY

## 2019-04-18 NOTE — PROGRESS NOTES
S: Pt here for OB follow up. Doing well.   Reports fetal movements.    negative vaginal bleeding.    negative leakage of fluid.    negative contractions.    Reviewed blood sugar log with patient.  Reviewed diet.  Questions answered.    O: VSS Afeb      See prenatal vitals, chart for today's exam    FBS range:all 80s  1hr post prandial range:All WNL    Insulin regimen: none  On Metformin 500 mg BID  Hg A1c 7.4 now down  To 5.8    NL anatomy US discussed    A/P:  28 y.o.  23w3d with Class C DM who presents for obstetric follow-up.  Diabetes is under very good control with metformin.  History of preeclampsia currently on baby aspirin daily  Encounter Diagnoses   Name Primary?   • High-risk pregnancy supervision, second trimester    • Modified White class C pregestational diabetes mellitus    • History of  delivery    • Essential hypertension      1.  Pregnancy precautions and plan of care reviewed  2.  Continue metformin and diet            3.  NSTs: 2x/week to begin at 32 weeks.  4.  Continue prenatal vitamins.  5.  Watch weight gain.  Exercise at least 30 minutes daily  6.  Drink at least 2 liters of water daily.  7.  Encouraged prenatal classes.  8.  Follow-up in 2weeks for obstetric follow-up.

## 2019-04-19 ENCOUNTER — TELEPHONE (OUTPATIENT)
Dept: OBGYN | Facility: CLINIC | Age: 29
End: 2019-04-19

## 2019-04-19 NOTE — TELEPHONE ENCOUNTER
Pt LVM on triage line stating she wanted to know the status of her FMLA paperwork. Spoke with Obdulia SHAH Who stated paperwork would be done today and would be emailed over. I called pt back to inform her. I got a VM and left a msg asking pt to give us a call back at her earliest convenience.

## 2019-04-30 ENCOUNTER — HOSPITAL ENCOUNTER (OUTPATIENT)
Dept: LAB | Facility: MEDICAL CENTER | Age: 29
End: 2019-04-30
Attending: OBSTETRICS & GYNECOLOGY
Payer: COMMERCIAL

## 2019-04-30 DIAGNOSIS — O09.92 HIGH-RISK PREGNANCY SUPERVISION, SECOND TRIMESTER: ICD-10-CM

## 2019-04-30 LAB
HCT VFR BLD AUTO: 36.9 % (ref 37–47)
HGB BLD-MCNC: 12.3 G/DL (ref 12–16)
TREPONEMA PALLIDUM IGG+IGM AB [PRESENCE] IN SERUM OR PLASMA BY IMMUNOASSAY: NON REACTIVE

## 2019-04-30 PROCEDURE — 85014 HEMATOCRIT: CPT

## 2019-04-30 PROCEDURE — 36415 COLL VENOUS BLD VENIPUNCTURE: CPT

## 2019-04-30 PROCEDURE — 86780 TREPONEMA PALLIDUM: CPT

## 2019-04-30 PROCEDURE — 85018 HEMOGLOBIN: CPT

## 2019-05-02 ENCOUNTER — ROUTINE PRENATAL (OUTPATIENT)
Dept: OBGYN | Facility: CLINIC | Age: 29
End: 2019-05-02
Payer: MEDICAID

## 2019-05-02 VITALS — DIASTOLIC BLOOD PRESSURE: 66 MMHG | WEIGHT: 176 LBS | BODY MASS INDEX: 34.37 KG/M2 | SYSTOLIC BLOOD PRESSURE: 108 MMHG

## 2019-05-02 DIAGNOSIS — O24.419 GDM, CLASS A2: ICD-10-CM

## 2019-05-02 LAB — GLUCOSE BLD-MCNC: 118 MG/DL (ref 70–100)

## 2019-05-02 PROCEDURE — 90040 PR PRENATAL FOLLOW UP: CPT | Performed by: OBSTETRICS & GYNECOLOGY

## 2019-05-02 PROCEDURE — 82962 GLUCOSE BLOOD TEST: CPT | Performed by: OBSTETRICS & GYNECOLOGY

## 2019-05-02 NOTE — PROGRESS NOTES
Pt here today for OB follow up  Pt states has had cough for about a week  Reports +FM  Good # [phone]   Pharmacy Confirmed.  Chaperone offered and not indicated.   Diabetic supplies: None needed today.  Random Accucheck:118

## 2019-05-02 NOTE — PROGRESS NOTES
Diabetic prenatal visit;    25w3d  Patient Active Problem List    Diagnosis Date Noted   • Hx of preeclampsia, prior pregnancy, currently pregnant, second trimester 2019   • High-risk pregnancy supervision, second trimester 2019   • Modified White class C pregestational diabetes mellitus 2019   • History of  delivery 12/15/2017   • Essential hypertension 10/19/2017       The patient presents for prenatal care. She is doing well. Denies contractions leakage of fluid or vaginal bleeding. Having good fetal movement    Vitals:    19 0828   BP: 108/66   Weight: 79.8 kg (176 lb)       Hemoglobin A1C; 5.8    Fasting sugars; 80s  One hour Postprandial sugars; rarely above 130    Size equals dates, normal fetal heart rate      Continue ADA diet      New insulin regimen;   Metformin 500 mg p.o. twice daily    NSTs twice weekly starting at 32 weeks if class AII GDM or worse  Increase fluid hydration to 2 L per day  Discussed proper shoes to be worn her pregnancy  Increase exercise daily walking 30 minutes per day  Delivery by 39 weeks if class AII GDM or worse  Discussed support hose use during pregnancy    Followup; 2 weeks

## 2019-05-16 ENCOUNTER — ROUTINE PRENATAL (OUTPATIENT)
Dept: OBGYN | Facility: CLINIC | Age: 29
End: 2019-05-16
Payer: COMMERCIAL

## 2019-05-16 VITALS — BODY MASS INDEX: 34.57 KG/M2 | WEIGHT: 177 LBS | DIASTOLIC BLOOD PRESSURE: 66 MMHG | SYSTOLIC BLOOD PRESSURE: 120 MMHG

## 2019-05-16 DIAGNOSIS — O24.319 MODIFIED WHITE CLASS C PREGESTATIONAL DIABETES MELLITUS: ICD-10-CM

## 2019-05-16 LAB — GLUCOSE BLD-MCNC: 124 MG/DL (ref 70–100)

## 2019-05-16 PROCEDURE — 90040 PR PRENATAL FOLLOW UP: CPT | Performed by: OBSTETRICS & GYNECOLOGY

## 2019-05-16 PROCEDURE — 82962 GLUCOSE BLOOD TEST: CPT | Performed by: OBSTETRICS & GYNECOLOGY

## 2019-05-16 PROCEDURE — 90471 IMMUNIZATION ADMIN: CPT | Performed by: OBSTETRICS & GYNECOLOGY

## 2019-05-16 PROCEDURE — 90715 TDAP VACCINE 7 YRS/> IM: CPT | Performed by: OBSTETRICS & GYNECOLOGY

## 2019-05-16 NOTE — PROGRESS NOTES
Diabetic prenatal visit;    27w3d  Patient Active Problem List    Diagnosis Date Noted   • Hx of preeclampsia, prior pregnancy, currently pregnant, second trimester 2019   • High-risk pregnancy supervision, second trimester 2019   • Modified White class C pregestational diabetes mellitus 2019   • History of  delivery 12/15/2017   • Essential hypertension 10/19/2017       The patient presents for prenatal care. She is doing well. Denies contractions leakage of fluid or vaginal bleeding. Having good fetal movement    Vitals:    19 0819   BP: 120/66   Weight: 80.3 kg (177 lb)       Hemoglobin A1C; 5.8    Fasting sugars; patient did not bring logbook  One hour Postprandial sugars; patient did not bring logbook    Random fingerstick 124    Size equals dates, normal fetal heart rate    Pertussis vaccination today      Continue ADA diet      New insulin regimen;   Metformin 500 mg p.o. twice daily     Patient has been instructed that she needs to bring logbook and glucose meter to every visit    Last delivery was primary low transverse uterine  section at 32 weeks for severe preeclampsia performed by myself.     counseling performed patient is not sure if she wants  but we discussed the risks as noted in the  consent form including risk of uterine rupture with possible fetal death or neurologic complications.  Patient is also not sure if she wants tubal ligation but we will have her sign the forms anyway.   consent signed     NSTs twice weekly starting at 32 weeks if class AII GDM or worse      Delivery by 39 weeks if class AII GDM or worse      Followup; 2 weeks

## 2019-05-16 NOTE — PROGRESS NOTES
Pt here today for OB follow up / DIabetic   Reports +FM  WT: 177 lb  BP: 120/66  PT states she forgot her log book and her meter at work.   LYNNE sheet given and explained today  Random glucose check: 124 (1.5 hour post breakfast pt states wheat toast with and 1/2 cup of milk)  PT states 2 weeks ago she had itching on her palms of her hands and fee that lasted for 1.5 week, states it's gone now. States no other concerns today.   Declines BTL  Desires Tdap vaccine  Good # [phone]    Tdap vaccine given. Left Deltoid. VIS given and screening check list reviewed with pt.  Tdap vaccine verified by Brissa Austin ([place])

## 2019-05-16 NOTE — LETTER
"Count Your Baby's Movements  Another step to a healthy delivery    Angelina Heathuilar             Dept: [phone]    How Many Weeks Pregnant? 27W3D    Date to Begin Countin2019              How to use this chart    One way for your physician to keep track of your baby's health is by knowing how often the baby moves (or \"kicks\") in your womb.  You can help your physician to do this by using this chart every day.    Every day, you should see how many hours it takes for your baby to move 10 times.  Start in the morning, as soon as you get up.    · First, write down the time your baby moves until you get to 10.  · Check off one box every time your baby moves until you get to 10.  · Write down the time you finished counting in the last column.  · Total how long it took to count up all 10 movements.  · Finally, fill in the box that shows how long this took.  After counting 10 movements, you no longer have to count any more that day.  The next morning, just start counting again as soon as you get up.    What should you call a \"movement\"?  It is hard to say, because it will feel different from one mother to another and from one pregnancy to the next.  The important thing is that you count the movements the same way throughout your pregnancy.  If you have more questions, you should ask your physician.    Count carefully every day!  SAMPLE:  Week 28    How many hours did it take to feel 10 movements?       Start  Time     1     2     3     4     5     6     7     8     9     10   Finish Time   Mon 8:20 ·  ·  ·  ·  ·  ·  ·  ·  ·  ·  11:40                  Sat               Sun                 IMPORTANT: You should contact your physician if it takes more than two hours for you to feel 10 movements.  Each morning, write down the time and start to count the movements of your baby.  Keep track by checking off one box every time you feel one movement.  When you have " "felt 10 \"kicks\", write down the time you finished counting in the last column.  Then fill in the   box (over the check shana) for the number of hours it took.  Be sure to read the complete instructions on the previous page.            "

## 2019-05-20 ENCOUNTER — HOSPITAL ENCOUNTER (OUTPATIENT)
Facility: MEDICAL CENTER | Age: 29
End: 2019-05-21
Attending: OBSTETRICS & GYNECOLOGY | Admitting: OBSTETRICS & GYNECOLOGY
Payer: COMMERCIAL

## 2019-05-20 PROCEDURE — 81002 URINALYSIS NONAUTO W/O SCOPE: CPT

## 2019-05-21 VITALS
TEMPERATURE: 97.4 F | WEIGHT: 177 LBS | RESPIRATION RATE: 16 BRPM | SYSTOLIC BLOOD PRESSURE: 128 MMHG | DIASTOLIC BLOOD PRESSURE: 73 MMHG | BODY MASS INDEX: 34.75 KG/M2 | HEART RATE: 94 BPM | HEIGHT: 60 IN

## 2019-05-21 LAB
APPEARANCE UR: CLEAR
COLOR UR AUTO: YELLOW
GLUCOSE UR QL STRIP.AUTO: NEGATIVE MG/DL
KETONES UR QL STRIP.AUTO: ABNORMAL MG/DL
LEUKOCYTE ESTERASE UR QL STRIP.AUTO: ABNORMAL
NITRITE UR QL STRIP.AUTO: NEGATIVE
PH UR STRIP.AUTO: 5.5 [PH]
PROT UR QL STRIP: NEGATIVE MG/DL
RBC UR QL AUTO: NEGATIVE
SP GR UR: 1.01

## 2019-05-21 PROCEDURE — 59025 FETAL NON-STRESS TEST: CPT

## 2019-05-21 PROCEDURE — 700102 HCHG RX REV CODE 250 W/ 637 OVERRIDE(OP): Performed by: NURSE PRACTITIONER

## 2019-05-21 PROCEDURE — A9270 NON-COVERED ITEM OR SERVICE: HCPCS | Performed by: NURSE PRACTITIONER

## 2019-05-21 RX ORDER — ACETAMINOPHEN 325 MG/1
650 TABLET ORAL ONCE
Status: COMPLETED | OUTPATIENT
Start: 2019-05-21 | End: 2019-05-21

## 2019-05-21 RX ADMIN — ACETAMINOPHEN 650 MG: 325 TABLET, FILM COATED ORAL at 00:23

## 2019-05-21 NOTE — PROGRESS NOTES
edc   28 weeks    Complaints: left upper back pain    Significant history: dm type 2, c/s x1, severe pre eclampsia    2350: pt to labor and delivery c/o left upper back pain x3 days.  Pt denies vaginal bleeding, leaking, contractions, or cramping.  efm and toco applied. Vss.  Abdomen is non tender.  No CVA tenderness.  No dysuria.  ua done. Report to anayeli lujan, orders received.     0023: tylenol and heat pack given, will monitor for pain relief.    0100: rn at , pt reports pain is much better.  Report to ce trotter cnm.  Orders to discharge home with  labor precautions.  Pt discharged in stable condition.

## 2019-05-21 NOTE — PROGRESS NOTES
S: Pt is a 28 y.o.  at 28w1d with Estimated Date of Delivery: 19 who presents to triage c/o left upper back pain. States it has been there for the past 3 days. 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 Has not yet tried Tylenol for pain, but states that a warm shower usually helps.  Denies VB, RUCs, LOF.  Reports good FM.      She has a history of  section x 1 for pre-eclampsia, and has type 2 diabetes.    O: Temp 36.3 °C (97.4 °F) (Temporal)   Resp 16   Ht 1.524 m (5')   Wt 80.3 kg (177 lb)   BMI 34.57 kg/m²          NST: Done and read by me         Indication: 28 week gestation with upper back pain       FHR: 140       Variability: moderate       Acclerations: positive       Decelerations: negative       Reactive: reactive for gestational age         [place]: No UCs, some irritability       SVE: deferred         A/P  Patient Active Problem List    Diagnosis Date Noted   • Hx of preeclampsia, prior pregnancy, currently pregnant, second trimester 2019   • High-risk pregnancy supervision, second trimester 2019   • Modified White class C pregestational diabetes mellitus 2019   • History of  delivery-documented LTCS 12/15/2017   • Essential hypertension 10/19/2017     Tylenol and heat pack to patient for comfort.    1.  IUP @ 28w1d  2.  NST reactive for gestation.  3.  No s/sx of labor  4.  Probable musculoskeletal pain, comfort measures discussed  5.  F/u TPC at next scheduled appt.    Namrata Kay CNM, APRN

## 2019-05-30 ENCOUNTER — NON-PROVIDER VISIT (OUTPATIENT)
Dept: OBGYN | Facility: CLINIC | Age: 29
End: 2019-05-30
Payer: COMMERCIAL

## 2019-05-30 ENCOUNTER — ROUTINE PRENATAL (OUTPATIENT)
Dept: OBGYN | Facility: CLINIC | Age: 29
End: 2019-05-30
Payer: COMMERCIAL

## 2019-05-30 VITALS — SYSTOLIC BLOOD PRESSURE: 112 MMHG | DIASTOLIC BLOOD PRESSURE: 64 MMHG | WEIGHT: 178 LBS | BODY MASS INDEX: 34.76 KG/M2

## 2019-05-30 DIAGNOSIS — O24.419 GESTATIONAL DIABETES MELLITUS (GDM), ANTEPARTUM, GESTATIONAL DIABETES METHOD OF CONTROL UNSPECIFIED: ICD-10-CM

## 2019-05-30 DIAGNOSIS — O24.319 MODIFIED WHITE CLASS C PREGESTATIONAL DIABETES MELLITUS: ICD-10-CM

## 2019-05-30 LAB — GLUCOSE BLD-MCNC: 136 MG/DL (ref 70–100)

## 2019-05-30 PROCEDURE — 82962 GLUCOSE BLOOD TEST: CPT | Performed by: OBSTETRICS & GYNECOLOGY

## 2019-05-30 PROCEDURE — 97803 MED NUTRITION INDIV SUBSEQ: CPT | Performed by: DIETITIAN, REGISTERED

## 2019-05-30 NOTE — PROGRESS NOTES
Follow Up Nutrition Assessment:   Time: 8:35-8:50am     Subjective:  -Pt states she has noticed her blood sugars have gone up a little bit but all remaining <140 1 hour post prandial. All fastings are <90 as well   -Has 1 cup milk for HS snack q night (~10pm)   -She is label reading and looking at carb content but admits she isnt always doing this.   -Has some questions to clarify she is reading the label correctly.     Objective:   Anthropometrics:   Today's Weight: 178lb    Pre-pregnancy weight: 174lb  Total weight gain: +4  Weeks gestation: 29w3d    Biochemical data, medical test and procedures:  Lab Results   Component Value Date/Time    POCGLUCOSE 136 (A) 05/30/2019 08:37 AM       Glucose Log Book (most recent):    Fasting glucose range: 79-88   1 hour post prandial glucose range:     Assessment:   Recommended Diet:  1700 calorie meal plan (see media for detailed meal plan)    Assessment Notes:   Angelina is doing well with following the meal plan and her blood sugars are well controlled. Although there are a few going over 130 she has not had any over 140. Reviewed carb serving recommendations of 15g at breakfast, 45g at lunch and dinner, and 15g at snacks. Reviewed label reading with pt as well. Encouraged her to count carbs especially if blood sugars are increasing. Also recommended she look at [employer] website/miguel to assist with carb counting. She is motivated to avoid insulin if able.     Plan: Monitoring & Evaluation  Goals:  1. Follow meal plan as outlined above; 3 meals and 3 snacks daily.   2. Check FSBS 4 times a day  (fasting, and 1 hour after each meal)  3. FSBS Goals= Fasting <90; 1 hour PP <130   4. Bring blood sugar log book to each appointment   5. Appropriate weight gain during pregnancy  6. Count carbs to follow meal plan carb guidelines        Follow up PRN  Prudence Wheeler, RD, LD, CDE  [phone]

## 2019-05-30 NOTE — PROGRESS NOTES
Pt here today for OB follow up  Pt states no complains   Reports +FM  Good # [phone]  Pharmacy Confirmed.  Chaperone offered and not indicated.   Diabetic supplies: Needs strips. Rx pended.  Random Accucheck:136

## 2019-05-30 NOTE — PROGRESS NOTES
Diabetic prenatal visit;    29w3d  Patient Active Problem List    Diagnosis Date Noted   • Hx of preeclampsia, prior pregnancy, currently pregnant, second trimester 2019   • High-risk pregnancy supervision, second trimester 2019   • Modified White class C pregestational diabetes mellitus 2019   • History of  delivery-documented LTCS 12/15/2017   • Essential hypertension 10/19/2017       The patient presents for prenatal care. She is doing well. Denies contractions leakage of fluid or vaginal bleeding. Having good fetal movement    Vitals:    19 0827   BP: 112/64   Weight: 80.7 kg (178 lb)       Hemoglobin A1C; 5.8    Fasting sugars; 80s  One hour Postprandial sugars; rarely above 130    Random fingerstick 136    Size equals dates, normal fetal heart rate      Continue ADA diet      New insulin regimen;    Metformin 500 mg p.o. twice daily    Patient wants repeat  not sure if she wants BTL at this time.  She has signed BTL form though    NSTs twice weekly starting at 32 weeks if class AII GDM or worse      Delivery by 39 weeks if class AII GDM or worse      Followup; 2 weeks

## 2019-06-06 ENCOUNTER — ROUTINE PRENATAL (OUTPATIENT)
Dept: OBGYN | Facility: CLINIC | Age: 29
End: 2019-06-06
Payer: MEDICAID

## 2019-06-06 VITALS — WEIGHT: 178 LBS | BODY MASS INDEX: 34.76 KG/M2 | SYSTOLIC BLOOD PRESSURE: 108 MMHG | DIASTOLIC BLOOD PRESSURE: 62 MMHG

## 2019-06-06 DIAGNOSIS — O24.319 MODIFIED WHITE CLASS C PREGESTATIONAL DIABETES MELLITUS: ICD-10-CM

## 2019-06-06 PROCEDURE — 90040 PR PRENATAL FOLLOW UP: CPT | Performed by: OBSTETRICS & GYNECOLOGY

## 2019-06-06 NOTE — PROGRESS NOTES
Pt here today for OB follow up  Pt states no complaints   Reports +FM  Good # [phone]  Pharmacy Confirmed.  Chaperone offered and not indicated.   Diabetic supplies: None needed today  Random Accucheck: 79

## 2019-06-06 NOTE — PROGRESS NOTES
Diabetic prenatal visit;    30w3d  Patient Active Problem List    Diagnosis Date Noted   • Hx of preeclampsia, prior pregnancy, currently pregnant, second trimester 2019   • High-risk pregnancy supervision, second trimester 2019   • Modified White class C pregestational diabetes mellitus 2019   • History of  delivery-documented LTCS 12/15/2017   • Essential hypertension 10/19/2017       The patient presents for prenatal care. She is doing well. Denies contractions leakage of fluid or vaginal bleeding. Having good fetal movement    Vitals:    19 0817   BP: 108/62   Weight: 80.7 kg (178 lb)       Hemoglobin A1C; 5.8    Fasting sugars; 80s  One hour Postprandial sugars; under 135    Random fingerstick 79    Size equals dates, normal fetal heart rate      Continue ADA diet    New insulin regimen;   Metformin 500 mg p.o. twice daily  Ultrasound ordered to check fetal growth      NSTs twice weekly starting at 32 weeks if class AII GDM or worse      Delivery by 39 weeks if class AII GDM or worse      Followup; 1 weeks

## 2019-06-13 ENCOUNTER — ROUTINE PRENATAL (OUTPATIENT)
Dept: OBGYN | Facility: CLINIC | Age: 29
End: 2019-06-13
Payer: COMMERCIAL

## 2019-06-13 VITALS — SYSTOLIC BLOOD PRESSURE: 108 MMHG | WEIGHT: 178 LBS | BODY MASS INDEX: 34.76 KG/M2 | DIASTOLIC BLOOD PRESSURE: 76 MMHG

## 2019-06-13 DIAGNOSIS — O24.415 ORAL HYPOGLYCEMIC CONTROLLED WHITE CLASSIFICATION A2 GESTATIONAL DIABETES MELLITUS (GDM): ICD-10-CM

## 2019-06-13 PROBLEM — O24.410 DIET CONTROLLED GESTATIONAL DIABETES MELLITUS (GDM) IN THIRD TRIMESTER: Status: ACTIVE | Noted: 2019-06-13

## 2019-06-13 LAB — GLUCOSE BLD-MCNC: 97 MG/DL (ref 70–100)

## 2019-06-13 PROCEDURE — 82962 GLUCOSE BLOOD TEST: CPT | Performed by: OBSTETRICS & GYNECOLOGY

## 2019-06-13 PROCEDURE — 90040 PR PRENATAL FOLLOW UP: CPT | Performed by: OBSTETRICS & GYNECOLOGY

## 2019-06-13 NOTE — PROGRESS NOTES
GDM Class C. OB F/U.  + fetal movement  Denies any VB, LOF or UC's  Phone #[phone]  Pharmacy confirmed  Diabetic supplies: None needed today  Metformin 500mg BID  US on 7/1/2019 limited   Random 97, post 715 breakfast

## 2019-06-13 NOTE — PROGRESS NOTES
S: Pt 28 y.o.  31w3d with Class C GDM here for OB follow up. Doing well.     positive fetal movement.    negative vaginal bleeding.    negative leakage of fluid.    negative contractions.    Reviewed blood sugar log with patient.  Reviewed diet.  Questions answered.    FBS range: under 90  1hr post prandial range: all under 140  POC glucose: 97    Current regimen  Metformin 500mg PO BID    O: VSS Afeb      See prenatal vitals, chart for today's exam  Fundal height: 30cm  FH: 145      A/P:  28 y.o.  31w3d with Class C GDM who presents for obstetric follow-up.    - No change in medication regimen at this time.   - NSTs: 2x/week to begin at 32 weeks.  - Delivery by 39 weeks for class A2 or worse  - Continue prenatal vitamins.  - Watch weight gain.  Exercise at least 30 minutes daily  - Follow-up in 1weeks for obstetric follow-up and NST.

## 2019-06-17 ENCOUNTER — ROUTINE PRENATAL (OUTPATIENT)
Dept: OBGYN | Facility: CLINIC | Age: 29
End: 2019-06-17
Payer: COMMERCIAL

## 2019-06-17 DIAGNOSIS — O24.419 GDM, CLASS A2: ICD-10-CM

## 2019-06-17 LAB
NST ACOUSTIC STIMULATION: NORMAL
NST ACTION NECESSARY: NORMAL
NST ASSESSMENT: NORMAL
NST BASELINE: NORMAL
NST INDICATIONS: NORMAL
NST OTHER DATA: NORMAL
NST READ BY: NORMAL
NST RETURN: NORMAL
NST UTERINE ACTIVITY: NORMAL

## 2019-06-17 PROCEDURE — 59025 FETAL NON-STRESS TEST: CPT | Performed by: OBSTETRICS & GYNECOLOGY

## 2019-06-17 PROCEDURE — 90040 PR PRENATAL FOLLOW UP: CPT | Performed by: OBSTETRICS & GYNECOLOGY

## 2019-06-17 NOTE — PROGRESS NOTES
Angelina Gonzalez, a  at 32w0d with an JATINDER of 2019, by Ultrasound, was seen at [facility] for a nonstress test.    Nonstress Test  Reason for NST: Diabetes  Variability: Moderate  Decelerations: None  Accelerations: Yes  Acoustic Stimulator: No  Baseline: 125  Uterine Irritability: No  Contractions: Not present  Nonstress Test Interpretation  Comments: reactive NST per my read - Dr. Quinn

## 2019-06-20 ENCOUNTER — ROUTINE PRENATAL (OUTPATIENT)
Dept: OBGYN | Facility: CLINIC | Age: 29
End: 2019-06-20
Payer: COMMERCIAL

## 2019-06-20 VITALS — SYSTOLIC BLOOD PRESSURE: 129 MMHG | WEIGHT: 179 LBS | BODY MASS INDEX: 34.96 KG/M2 | DIASTOLIC BLOOD PRESSURE: 80 MMHG

## 2019-06-20 DIAGNOSIS — O24.319 MODIFIED WHITE CLASS C PREGESTATIONAL DIABETES MELLITUS: ICD-10-CM

## 2019-06-20 LAB
GLUCOSE BLD-MCNC: 116 MG/DL (ref 70–100)
NST ACOUSTIC STIMULATION: NORMAL
NST ACTION NECESSARY: NORMAL
NST ASSESSMENT: NORMAL
NST BASELINE: NORMAL
NST INDICATIONS: NORMAL
NST OTHER DATA: NORMAL
NST READ BY: NORMAL
NST RETURN: NORMAL
NST UTERINE ACTIVITY: NORMAL

## 2019-06-20 PROCEDURE — 82962 GLUCOSE BLOOD TEST: CPT | Performed by: OBSTETRICS & GYNECOLOGY

## 2019-06-20 PROCEDURE — 59025 FETAL NON-STRESS TEST: CPT | Performed by: OBSTETRICS & GYNECOLOGY

## 2019-06-20 PROCEDURE — 90040 PR PRENATAL FOLLOW UP: CPT | Performed by: OBSTETRICS & GYNECOLOGY

## 2019-06-20 NOTE — PROGRESS NOTES
Pt here for GDM f/u. Denies any complications today. Reports +FM.  Good phone# [phone]  Pharmacy verified with pt.

## 2019-06-20 NOTE — PROGRESS NOTES
Angelina Gonzalez, a  at 32w3d with an JATINDER of 2019, by Ultrasound, was seen at [facility] for a nonstress test.    Nonstress Test  Reason for NST: Diabetes  Variability: Moderate  Decelerations: None  Accelerations: Yes  Acoustic Stimulator: Yes  Baseline: 130  Uterine Irritability: No  Contractions: Not present  Nonstress Test Interpretation  Comments: as per my read, reactive NST

## 2019-06-20 NOTE — PROGRESS NOTES
S: Pt 28 y.o.  32w3d with Type II DM here for OB follow up. Doing well. No complaints.     positive fetal movement.    negative vaginal bleeding.    negative leakage of fluid.    negative contractions.      Reviewed blood sugar log with patient.  Reviewed diet.  Questions answered.  FBS range: 75-89  1hr post prandial range:   POC: 116    Current Insulin regimen  Metformin 500mg PO BID  A1C: 5.8    O: VSS Afeb      See prenatal vitals, chart for today's exam  Fundal height: 34cm    Prenatal labs:  T&S: O+/ab neg, prenatal panel wnl. Pap smear NILM  GBS: at 35wks  Baseline 24 hour: 82.5  Level II: normal anatomy.   Growth: scheduled not done yet.    A/P:  28 y.o.  32w3d with Type II DM who presents for obstetric follow-up.  Patient Active Problem List   Diagnosis   • Essential hypertension   • History of  delivery-documented LTCS   • Modified White class C pregestational diabetes mellitus   • High-risk pregnancy supervision, second trimester   • Hx of preeclampsia, prior pregnancy, currently pregnant, second trimester   • Diet controlled gestational diabetes mellitus (GDM) in third trimester   • Oral hypoglycemic controlled White classification A2 gestational diabetes mellitus (GDM)     - cont metformin 500mg BID  - NSTs: 2x/week at 32 weeks.  - Delivery by 39 weeks for class A2 or worse; repeat  section at 39 weeks.   - Continue prenatal vitamins.  - Watch weight gain.  Exercise at least 30 minutes daily  - Follow-up in 1 weeks for obstetric follow-up. And next week for NST

## 2019-06-24 ENCOUNTER — ROUTINE PRENATAL (OUTPATIENT)
Dept: OBGYN | Facility: CLINIC | Age: 29
End: 2019-06-24
Payer: COMMERCIAL

## 2019-06-24 DIAGNOSIS — O09.92 HIGH-RISK PREGNANCY SUPERVISION, SECOND TRIMESTER: ICD-10-CM

## 2019-06-24 DIAGNOSIS — O24.415 ORAL HYPOGLYCEMIC CONTROLLED WHITE CLASSIFICATION A2 GESTATIONAL DIABETES MELLITUS (GDM): ICD-10-CM

## 2019-06-24 DIAGNOSIS — I10 ESSENTIAL HYPERTENSION: ICD-10-CM

## 2019-06-24 PROCEDURE — 59025 FETAL NON-STRESS TEST: CPT | Performed by: PHYSICIAN ASSISTANT

## 2019-06-27 ENCOUNTER — ROUTINE PRENATAL (OUTPATIENT)
Dept: OBGYN | Facility: CLINIC | Age: 29
End: 2019-06-27
Payer: COMMERCIAL

## 2019-06-27 VITALS — BODY MASS INDEX: 34.57 KG/M2 | DIASTOLIC BLOOD PRESSURE: 68 MMHG | SYSTOLIC BLOOD PRESSURE: 116 MMHG | WEIGHT: 177 LBS

## 2019-06-27 DIAGNOSIS — O24.419 GDM, CLASS A2: ICD-10-CM

## 2019-06-27 LAB
GLUCOSE BLD-MCNC: 71 MG/DL (ref 70–100)
NST ACOUSTIC STIMULATION: NORMAL
NST ACTION NECESSARY: NORMAL
NST ASSESSMENT: NORMAL
NST BASELINE: NORMAL
NST INDICATIONS: NORMAL
NST OTHER DATA: NORMAL
NST READ BY: NORMAL
NST RETURN: NORMAL
NST UTERINE ACTIVITY: NORMAL

## 2019-06-27 PROCEDURE — 90040 PR PRENATAL FOLLOW UP: CPT | Performed by: OBSTETRICS & GYNECOLOGY

## 2019-06-27 PROCEDURE — 59025 FETAL NON-STRESS TEST: CPT | Performed by: OBSTETRICS & GYNECOLOGY

## 2019-06-27 PROCEDURE — 82962 GLUCOSE BLOOD TEST: CPT | Performed by: OBSTETRICS & GYNECOLOGY

## 2019-06-27 NOTE — PROGRESS NOTES
Diabetic prenatal visit;    33w3d  Patient Active Problem List    Diagnosis Date Noted   • Diet controlled gestational diabetes mellitus (GDM) in third trimester 2019   • Oral hypoglycemic controlled White classification A2 gestational diabetes mellitus (GDM) 2019   • Hx of preeclampsia, prior pregnancy, currently pregnant, second trimester 2019   • High-risk pregnancy supervision, second trimester 2019   • Modified White class C pregestational diabetes mellitus 2019   • History of  delivery-documented LTCS 12/15/2017   • Essential hypertension 10/19/2017       The patient presents for prenatal care. She is doing well. Denies contractions leakage of fluid or vaginal bleeding. Having good fetal movement    Vitals:    19 0926   BP: 116/68   Weight: 80.3 kg (177 lb)       Hemoglobin A1C; 5.8    Fasting sugars; 70s to 80s  One hour Postprandial sugars; under 140    Random fingerstick 71    Size equals dates, normal fetal heart rate      Continue ADA diet      Metformin 500 mg p.o. twice daily    NSTs twice weekly starting at 32 weeks if class AII GDM or worse      Delivery by 39 weeks if class AII GDM or worse      Followup; 1 weeks

## 2019-06-27 NOTE — PROGRESS NOTES
OB f/u GDM and NST. Good # [phone]  U/S on 7/1/19 limited  Good FM  Pt states has been constipated.   Pharmacy verified.  Diabetic supplies  BS in clinic : 71 Pt states last ate at 0800 am.  BTL unsure

## 2019-07-01 ENCOUNTER — APPOINTMENT (OUTPATIENT)
Dept: RADIOLOGY | Facility: IMAGING CENTER | Age: 29
End: 2019-07-01
Attending: OBSTETRICS & GYNECOLOGY
Payer: COMMERCIAL

## 2019-07-01 ENCOUNTER — ROUTINE PRENATAL (OUTPATIENT)
Dept: OBGYN | Facility: CLINIC | Age: 29
End: 2019-07-01
Payer: COMMERCIAL

## 2019-07-01 DIAGNOSIS — O24.319 MODIFIED WHITE CLASS C PREGESTATIONAL DIABETES MELLITUS: ICD-10-CM

## 2019-07-01 DIAGNOSIS — O24.419 GDM, CLASS A2: Primary | ICD-10-CM

## 2019-07-01 LAB
NST ACOUSTIC STIMULATION: NORMAL
NST ACTION NECESSARY: NORMAL
NST ASSESSMENT: NORMAL
NST BASELINE: 125
NST INDICATIONS: NORMAL
NST OTHER DATA: NORMAL
NST READ BY: NORMAL
NST RETURN: NORMAL
NST UTERINE ACTIVITY: NORMAL

## 2019-07-01 PROCEDURE — 59025 FETAL NON-STRESS TEST: CPT | Performed by: NURSE PRACTITIONER

## 2019-07-01 PROCEDURE — 76816 OB US FOLLOW-UP PER FETUS: CPT | Performed by: OBSTETRICS & GYNECOLOGY

## 2019-07-01 NOTE — PROGRESS NOTES
S: Pt is a 28 y.o.  at 34w0d with  Estimated Date of Delivery: 19 who presents for scheduled NST for GDM A2. No complaints.  Denies VB, RUCs, LOF.  Reports good FM.      O: There were no vitals taken for this visit.         FHTs: baseline 125, accels positive,  decels positive for variable decel x 1,  Variability moderate       [place]: no UCs           A/P  Patient Active Problem List    Diagnosis Date Noted   • GDM, class A2 2019   • Hx of preeclampsia, prior pregnancy, currently pregnant, second trimester 2019   • High-risk pregnancy supervision, second trimester 2019   • Modified White class C pregestational diabetes mellitus 2019   • History of  delivery-documented LTCS 12/15/2017   • Essential hypertension 10/19/2017       1.  IUP @ 34w0d  2.  Reactive, category 1 NST.  3.  F/u as sched.  4.  Continue 2x weekly NST's    Has US scheduled for 3:30 today to check growth and REGINALD  Will reassess after US done if necessary  Overall, very reassuring    Namrata JUANM, APRN

## 2019-07-03 ENCOUNTER — ROUTINE PRENATAL (OUTPATIENT)
Dept: OBGYN | Facility: CLINIC | Age: 29
End: 2019-07-03
Payer: COMMERCIAL

## 2019-07-03 VITALS — WEIGHT: 180 LBS | SYSTOLIC BLOOD PRESSURE: 124 MMHG | BODY MASS INDEX: 35.15 KG/M2 | DIASTOLIC BLOOD PRESSURE: 76 MMHG

## 2019-07-03 DIAGNOSIS — O24.419 GDM, CLASS A2: ICD-10-CM

## 2019-07-03 DIAGNOSIS — I10 ESSENTIAL HYPERTENSION: ICD-10-CM

## 2019-07-03 DIAGNOSIS — O24.913 DIABETES MELLITUS AFFECTING PREGNANCY IN THIRD TRIMESTER: ICD-10-CM

## 2019-07-03 DIAGNOSIS — O09.293 HX OF PREECLAMPSIA, PRIOR PREGNANCY, CURRENTLY PREGNANT, THIRD TRIMESTER: ICD-10-CM

## 2019-07-03 DIAGNOSIS — Z98.891 HISTORY OF CESAREAN DELIVERY: ICD-10-CM

## 2019-07-03 PROBLEM — O09.93 HIGH-RISK PREGNANCY SUPERVISION, THIRD TRIMESTER: Status: ACTIVE | Noted: 2019-03-21

## 2019-07-03 LAB
GLUCOSE BLD-MCNC: 180 MG/DL (ref 70–100)
NST ACOUSTIC STIMULATION: NORMAL
NST ACTION NECESSARY: NORMAL
NST ASSESSMENT: NORMAL
NST BASELINE: NORMAL
NST INDICATIONS: NORMAL
NST OTHER DATA: NORMAL
NST READ BY: NORMAL
NST RETURN: NORMAL
NST UTERINE ACTIVITY: NORMAL

## 2019-07-03 PROCEDURE — 90040 PR PRENATAL FOLLOW UP: CPT | Performed by: OBSTETRICS & GYNECOLOGY

## 2019-07-03 PROCEDURE — 82962 GLUCOSE BLOOD TEST: CPT | Performed by: OBSTETRICS & GYNECOLOGY

## 2019-07-03 PROCEDURE — 59025 FETAL NON-STRESS TEST: CPT | Performed by: OBSTETRICS & GYNECOLOGY

## 2019-07-03 NOTE — PROGRESS NOTES
OB f/u GDM. Good #[phone]  Good FM  Pt. Denies VB, LOF, or UC's.   Pharmacy verified.  Diabetic supplies none needed  BS in clinic : 180 Pt states last ate at 1030 (went out with her mom for breakfast)  Chaperone offered and provided

## 2019-07-03 NOTE — PROGRESS NOTES
Patient ID [id number]     Angelina Gonzalez 28 y.o.  34w2d w/ JATINDER of 2019, by Ultrasound here today for routine prenatal visit. Patient reports good  fetal movement. denies contractions, denies vaginal bleeding, denies loss of fluid.    Pregnancy is complicated by   Patient Active Problem List    Diagnosis Date Noted   • GDM, class A2 2019   • Hx of preeclampsia, prior pregnancy, currently pregnant, third trimester 2019   • High-risk pregnancy supervision, third trimester 2019   • Modified White class C pregestational diabetes mellitus 2019   • History of  delivery-documented LTCS 12/15/2017   • Essential hypertension 10/19/2017       Insulin regimen  None - on Metformin 500mg BID    Average fasting glucose: 73-83  Average 1 hour postprandial:     Objective     Physical Exam  Weight: 81.6 kg (180 lb)  Expected Total Weight Gain: Could not be calculated   Pregravid BMI: Could not be calculated  Blood Pressure: 124/76       Prenatal Labs  Hepatitis B Surface Antigen   Date Value Ref Range Status   2019 Negative Negative Final     Comment:     It has been reported that certain assays will not detect all HBV  mutants.  If acute or chronic HBV infection is suspected and the  HBsAg result is negative, it is recommended that other serological  markers be tested to confirm the HBsAg nonreactivity.  HBsAg test  results should always be assessed in conjunction with the patient's  medical history, clinical examination, and other findings.       No results found for: ESTRIOL  Glycohemoglobin   Date Value Ref Range Status   04/15/2019 5.8 (H) 0.0 - 5.6 % Final     Comment:     Increased risk for diabetes:  5.7 -6.4%  Diabetes:  >6.4%  Glycemic control for adults with diabetes:  <7.0%  The above interpretations are per ADA guidelines.  Diagnosis  of diabetes mellitus on the basis of elevated Hemoglobin A1c  should be confirmed by repeating the Hb A1c test.       Creatinine    Date Value Ref Range Status   2019 0.69 0.50 - 1.40 mg/dL Final       Assessment & Plan   Patient Active Problem List    Diagnosis Date Noted   • GDM, class A2 2019   • Hx of preeclampsia, prior pregnancy, currently pregnant, third trimester 2019   • High-risk pregnancy supervision, third trimester 2019   • Modified White class C pregestational diabetes mellitus 2019   • History of  delivery-documented LTCS 12/15/2017   • Essential hypertension 10/19/2017     #DM2  Glucose Monitoring Discussed continue daily home glucose monitoring with the following targets:   Targets   Fasting <95   1 hr PP <130-140   2 hr PP <120   -symptoms of hypoglycemia and hyperglycemia reviewed  -call parameters discussed  Continue metformin 500 mg twice daily  Discussed dietary modifications and continuing exercise    #Fetal Wellbeing  Fetal growth 36 percentile on recent scan  -twice weekly  monitoring (NST and REGINALD) at 32 weeks gestation  #History of preeclampsia.  Blood pressure within normal limits today.  Baseline 24-hour protein 82 g, baseline creatinine 0.69  -patient is aware of signs and symptoms  - continue 81 mg aspirin    #Routine OB Care  -reviewed labs  -prenatal vitamin  -Repeat  with BTL request sent for 39 weeks    #Postpartum  -repeat glucose tolerance test at 6 weeks postpartum  -Desires BTL (paperwork already signed) versus IUD.  Discussed that we can continue to touch base with patient on this and she should just let us know if she would like an IUD referral placed as paperwork is Gabriele done for tubal if she decides to go that route    Follow up in 1 week for a routine prenatal visit.

## 2019-07-03 NOTE — PROGRESS NOTES
Angelina MAURY Gonzalez, a  at 34w2d with an JATINDER of 2019, by Ultrasound, was seen at [facility] for a nonstress test.    Nonstress Test  Reason for NST: Diabetes  Variability: Moderate  Decelerations: None  Accelerations: Yes  Baseline: 125  Uterine Irritability: No  Contractions: Not present  Nonstress Test Interpretation  Comments: Reactive NST per my read-Dr. Quinn

## 2019-07-08 ENCOUNTER — ROUTINE PRENATAL (OUTPATIENT)
Dept: OBGYN | Facility: CLINIC | Age: 29
End: 2019-07-08
Payer: COMMERCIAL

## 2019-07-08 DIAGNOSIS — O24.319 MODIFIED WHITE CLASS C PREGESTATIONAL DIABETES MELLITUS: ICD-10-CM

## 2019-07-08 PROCEDURE — 59025 FETAL NON-STRESS TEST: CPT | Performed by: OBSTETRICS & GYNECOLOGY

## 2019-07-11 ENCOUNTER — HOSPITAL ENCOUNTER (OUTPATIENT)
Facility: MEDICAL CENTER | Age: 29
End: 2019-07-11
Attending: OBSTETRICS & GYNECOLOGY
Payer: COMMERCIAL

## 2019-07-11 ENCOUNTER — ROUTINE PRENATAL (OUTPATIENT)
Dept: OBGYN | Facility: CLINIC | Age: 29
End: 2019-07-11
Payer: COMMERCIAL

## 2019-07-11 VITALS — WEIGHT: 179 LBS | SYSTOLIC BLOOD PRESSURE: 134 MMHG | DIASTOLIC BLOOD PRESSURE: 86 MMHG | BODY MASS INDEX: 34.96 KG/M2

## 2019-07-11 DIAGNOSIS — Z98.891 HISTORY OF CESAREAN DELIVERY: ICD-10-CM

## 2019-07-11 DIAGNOSIS — O24.419 GDM, CLASS A2: ICD-10-CM

## 2019-07-11 DIAGNOSIS — I10 ESSENTIAL HYPERTENSION: ICD-10-CM

## 2019-07-11 DIAGNOSIS — O09.293 HX OF PREECLAMPSIA, PRIOR PREGNANCY, CURRENTLY PREGNANT, THIRD TRIMESTER: ICD-10-CM

## 2019-07-11 DIAGNOSIS — O24.319 MODIFIED WHITE CLASS C PREGESTATIONAL DIABETES MELLITUS: ICD-10-CM

## 2019-07-11 DIAGNOSIS — O09.93 HIGH-RISK PREGNANCY SUPERVISION, THIRD TRIMESTER: ICD-10-CM

## 2019-07-11 LAB
GLUCOSE BLD-MCNC: 100 MG/DL (ref 70–100)
NST ACOUSTIC STIMULATION: NORMAL
NST ACTION NECESSARY: NORMAL
NST ASSESSMENT: NORMAL
NST BASELINE: NORMAL
NST INDICATIONS: NORMAL
NST OTHER DATA: NORMAL
NST READ BY: NORMAL
NST RETURN: NORMAL
NST UTERINE ACTIVITY: NORMAL

## 2019-07-11 PROCEDURE — 82962 GLUCOSE BLOOD TEST: CPT | Performed by: OBSTETRICS & GYNECOLOGY

## 2019-07-11 PROCEDURE — 59025 FETAL NON-STRESS TEST: CPT | Performed by: OBSTETRICS & GYNECOLOGY

## 2019-07-11 PROCEDURE — 90040 PR PRENATAL FOLLOW UP: CPT | Performed by: OBSTETRICS & GYNECOLOGY

## 2019-07-11 NOTE — PROGRESS NOTES
Patient ID [id number]     Angelina Gonzalez 28 y.o.  35w3d w/ JATINDER of 2019, by Ultrasound here today for routine prenatal visit. Patient reports good  fetal movement. denies contractions, denies vaginal bleeding, denies loss of fluid.    Pregnancy is complicated by   Patient Active Problem List    Diagnosis Date Noted   • GDM, class A2 2019   • Hx of preeclampsia, prior pregnancy, currently pregnant, third trimester 2019   • High-risk pregnancy supervision, third trimester 2019   • Modified White class C pregestational diabetes mellitus 2019   • History of  delivery-documented LTCS 12/15/2017   • Essential hypertension 10/19/2017       Insulin regimen none    GBS today  Average fasting glucose: 75-86  Average 1 hour postprandial:     Objective     Physical Exam  Weight: 81.2 kg (179 lb)  Expected Total Weight Gain: Could not be calculated   Pregravid BMI: Could not be calculated  Blood Pressure: 134/86         Prenatal Labs  Hepatitis B Surface Antigen   Date Value Ref Range Status   2019 Negative Negative Final     Comment:     It has been reported that certain assays will not detect all HBV  mutants.  If acute or chronic HBV infection is suspected and the  HBsAg result is negative, it is recommended that other serological  markers be tested to confirm the HBsAg nonreactivity.  HBsAg test  results should always be assessed in conjunction with the patient's  medical history, clinical examination, and other findings.       No results found for: ESTRIOL  Glycohemoglobin   Date Value Ref Range Status   04/15/2019 5.8 (H) 0.0 - 5.6 % Final     Comment:     Increased risk for diabetes:  5.7 -6.4%  Diabetes:  >6.4%  Glycemic control for adults with diabetes:  <7.0%  The above interpretations are per ADA guidelines.  Diagnosis  of diabetes mellitus on the basis of elevated Hemoglobin A1c  should be confirmed by repeating the Hb A1c test.       Creatinine   Date Value Ref  Range Status   2019 0.69 0.50 - 1.40 mg/dL Final       Assessment & Plan   Patient Active Problem List    Diagnosis Date Noted   • GDM, class A2 2019   • Hx of preeclampsia, prior pregnancy, currently pregnant, third trimester 2019   • High-risk pregnancy supervision, third trimester 2019   • Modified White class C pregestational diabetes mellitus 2019   • History of  delivery-documented LTCS 12/15/2017   • Essential hypertension 10/19/2017     #DM2  Glucose Monitoring Discussed continue daily home glucose monitoring with the following targets:   Targets   Fasting <95   1 hr PP <130-140   2 hr PP <120   -symptoms of hypoglycemia and hyperglycemia reviewed  -call parameters discussed  Insulin Regimen none  Metformin 500mg BID    #Fetal Wellbeing  -36%tile on last US   -start twice weekly  monitoring (NST and REGINALD) at 32 weeks gestation  #Routine OB Care  -reviewed labs  -prenatal vitamin  #Postpartum  -repeat glucose tolerance test at 6 weeks postpartum  --PPBCM - likely wants IUD but still unsure    Follow up in 1 week for a routine prenatal visit.

## 2019-07-11 NOTE — PROGRESS NOTES
Angelina MAURY Gonzalez, a  at 35w3d with an JATINDER of 2019, by Ultrasound, was seen at [facility] for a nonstress test.    Nonstress Test  Reason for NST: Diabetes  Variability: Moderate  Decelerations: None  Accelerations: Yes  Baseline: 130  Uterine Irritability: No  Contractions: Not present  Nonstress Test Interpretation  Comments: reactive NST per my read - Dr. Quinn

## 2019-07-11 NOTE — PROGRESS NOTES
OB f/u GDM, GBS and NST. Good # [phone]  Good FM  Pt states no complaints.   Pharmacy verified.  Diabetic supplies none needed today.   BS in clinic : 100  Pt states last ate at 0800 am.  Chaperone offered and not needed.   Pre-op for C/S on 8/1/19 @ 10:15 am, C/S on 8/6/19 0930 am, pt notified and given instructions today.   BTL consent form signed 5/16/19

## 2019-07-13 LAB — GP B STREP DNA SPEC QL NAA+PROBE: POSITIVE

## 2019-07-15 ENCOUNTER — ROUTINE PRENATAL (OUTPATIENT)
Dept: OBGYN | Facility: CLINIC | Age: 29
End: 2019-07-15
Payer: COMMERCIAL

## 2019-07-15 DIAGNOSIS — O24.419 GDM, CLASS A2: ICD-10-CM

## 2019-07-15 PROBLEM — O99.820 GBS (GROUP B STREPTOCOCCUS CARRIER), +RV CULTURE, CURRENTLY PREGNANT: Status: ACTIVE | Noted: 2019-07-15

## 2019-07-15 PROCEDURE — 59025 FETAL NON-STRESS TEST: CPT | Performed by: OBSTETRICS & GYNECOLOGY

## 2019-07-15 PROCEDURE — 90040 PR PRENATAL FOLLOW UP: CPT | Performed by: OBSTETRICS & GYNECOLOGY

## 2019-07-15 NOTE — PROGRESS NOTES
Angelina MENDIOLA Carlos, a  at 36w0d with an JATINDER of 2019, by Ultrasound, was seen at [facility] for a nonstress test.    Nonstress Test  Reason for NST: Diabetes  Variability: Moderate  Decelerations: None  Accelerations: Yes  Acoustic Stimulator: No  Baseline: 130  Uterine Irritability: No  Contractions: Not present  Nonstress Test Interpretation  Comments: reactive NST per my read - Dr. Ramey

## 2019-07-19 ENCOUNTER — ROUTINE PRENATAL (OUTPATIENT)
Dept: OBGYN | Facility: CLINIC | Age: 29
End: 2019-07-19
Payer: COMMERCIAL

## 2019-07-19 DIAGNOSIS — O24.419 GDM, CLASS A2: ICD-10-CM

## 2019-07-19 PROCEDURE — 59025 FETAL NON-STRESS TEST: CPT | Performed by: OBSTETRICS & GYNECOLOGY

## 2019-07-19 PROCEDURE — 90040 PR PRENATAL FOLLOW UP: CPT | Performed by: OBSTETRICS & GYNECOLOGY

## 2019-07-22 ENCOUNTER — ROUTINE PRENATAL (OUTPATIENT)
Dept: OBGYN | Facility: CLINIC | Age: 29
End: 2019-07-22
Payer: COMMERCIAL

## 2019-07-22 VITALS — SYSTOLIC BLOOD PRESSURE: 138 MMHG | BODY MASS INDEX: 35.74 KG/M2 | DIASTOLIC BLOOD PRESSURE: 73 MMHG | WEIGHT: 183 LBS

## 2019-07-22 DIAGNOSIS — O09.293 HX OF PREECLAMPSIA, PRIOR PREGNANCY, CURRENTLY PREGNANT, THIRD TRIMESTER: ICD-10-CM

## 2019-07-22 DIAGNOSIS — Z30.014 ENCOUNTER FOR INITIAL PRESCRIPTION OF INTRAUTERINE CONTRACEPTIVE DEVICE (IUD): ICD-10-CM

## 2019-07-22 DIAGNOSIS — O24.419 GDM, CLASS A2: ICD-10-CM

## 2019-07-22 PROBLEM — I10 ESSENTIAL HYPERTENSION: Status: RESOLVED | Noted: 2017-10-19 | Resolved: 2019-07-22

## 2019-07-22 PROCEDURE — 82962 GLUCOSE BLOOD TEST: CPT | Performed by: OBSTETRICS & GYNECOLOGY

## 2019-07-22 PROCEDURE — 59025 FETAL NON-STRESS TEST: CPT | Performed by: OBSTETRICS & GYNECOLOGY

## 2019-07-22 PROCEDURE — 90040 PR PRENATAL FOLLOW UP: CPT | Performed by: OBSTETRICS & GYNECOLOGY

## 2019-07-22 NOTE — PROGRESS NOTES
GDM Class A 2 . OB F/U.  + fetal movement  Denies any VB, LO or UC's  Phone # [phone]  Pharmacy confirmed  Diabetic supplies: no  C Section 8/6/19.

## 2019-07-22 NOTE — PROGRESS NOTES
Patient ID [id number]     Angelina Gonzalez 28 y.o.  37w0d w/ JATINDER of 2019, by Ultrasound here today for routine prenatal visit. Patient reports good  fetal movement. denies contractions, denies vaginal bleeding, denies loss of fluid.    Pregnancy is complicated by   Patient Active Problem List    Diagnosis Date Noted   • GBS (group B Streptococcus carrier), +RV culture, currently pregnant 07/15/2019   • GDM, class A2 2019   • Hx of preeclampsia, prior pregnancy, currently pregnant, third trimester 2019   • High-risk pregnancy supervision, third trimester 2019   • Modified White class C pregestational diabetes mellitus 2019   • History of  delivery-documented LTCS 12/15/2017     Insulin regimen  Metformin 500mg BID    GBS  +  Average fasting glucose: 71-86  Average 1 hour postprandial:         Objective     Physical Exam     Expected Total Weight Gain: Could not be calculated   Pregravid BMI: Could not be calculated            Prenatal Labs  Hepatitis B Surface Antigen   Date Value Ref Range Status   2019 Negative Negative Final     Comment:     It has been reported that certain assays will not detect all HBV  mutants.  If acute or chronic HBV infection is suspected and the  HBsAg result is negative, it is recommended that other serological  markers be tested to confirm the HBsAg nonreactivity.  HBsAg test  results should always be assessed in conjunction with the patient's  medical history, clinical examination, and other findings.       No results found for: ESTRIOL  Glycohemoglobin   Date Value Ref Range Status   04/15/2019 5.8 (H) 0.0 - 5.6 % Final     Comment:     Increased risk for diabetes:  5.7 -6.4%  Diabetes:  >6.4%  Glycemic control for adults with diabetes:  <7.0%  The above interpretations are per ADA guidelines.  Diagnosis  of diabetes mellitus on the basis of elevated Hemoglobin A1c  should be confirmed by repeating the Hb A1c test.       Creatinine    Date Value Ref Range Status   2019 0.69 0.50 - 1.40 mg/dL Final       Assessment & Plan   Patient Active Problem List    Diagnosis Date Noted   • GBS (group B Streptococcus carrier), +RV culture, currently pregnant 07/15/2019   • GDM, class A2 2019   • Hx of preeclampsia, prior pregnancy, currently pregnant, third trimester 2019   • High-risk pregnancy supervision, third trimester 2019   • Modified White class C pregestational diabetes mellitus 2019   • History of  delivery-documented LTCS 12/15/2017     #DM2  Glucose Monitoring Discussed continue daily home glucose monitoring with the following targets:    Targets   Fasting <95   1 hr PP <130-140   2 hr PP <120   -symptoms of hypoglycemia and hyperglycemia reviewed  -call parameters discussed  Insulin Regimen none  Metformin 500mg BID     #Fetal Wellbeing  -36%tile on last US   -twice weekly  monitoring (NST and REGINALD) at 32 weeks gestation  #Routine OB Care  -reviewed labs  -prenatal vitamin  -repeat CS scheduled  @ 930 w/ Dr. Linton  -GBS positive  #h/o PReE w/ SF in prior pregnancy.  Currently asymptomatic - precautions discussed.     #Chart states h/o essential HTN but patient denies carrying this diagnosis outside of pregnancy - only reports she had elevated pressures during her last pregnancy and has never been on any medications or been told she had high pressures outside of pregnancy.    #Postpartum  -repeat glucose tolerance test at 6 weeks postpartum  --PPBCM - wants Mirena - request placed     Follow up in 1 week for a routine prenatal visit.

## 2019-07-22 NOTE — PROGRESS NOTES
Angelina MENDIOLA Carlos, a  at 37w0d with an JATINDER of 2019, by Ultrasound, was seen at [facility] for a nonstress test.    Nonstress Test  Reason for NST: Diabetes  Variability: Moderate  Decelerations: None  Accelerations: Yes  Baseline: 130  Uterine Irritability: No  Contractions: Not present  Nonstress Test Interpretation  Comments: reactive NST per my read - Dr. Quinn

## 2019-07-25 ENCOUNTER — ROUTINE PRENATAL (OUTPATIENT)
Dept: OBGYN | Facility: CLINIC | Age: 29
End: 2019-07-25
Payer: COMMERCIAL

## 2019-07-25 ENCOUNTER — APPOINTMENT (OUTPATIENT)
Dept: OBGYN | Facility: CLINIC | Age: 29
End: 2019-07-25
Payer: COMMERCIAL

## 2019-07-25 DIAGNOSIS — O09.93 HIGH-RISK PREGNANCY SUPERVISION, THIRD TRIMESTER: ICD-10-CM

## 2019-07-25 DIAGNOSIS — O24.419 GDM, CLASS A2: ICD-10-CM

## 2019-07-25 DIAGNOSIS — O24.319 MODIFIED WHITE CLASS C PREGESTATIONAL DIABETES MELLITUS: ICD-10-CM

## 2019-07-25 DIAGNOSIS — O09.293 HX OF PREECLAMPSIA, PRIOR PREGNANCY, CURRENTLY PREGNANT, THIRD TRIMESTER: ICD-10-CM

## 2019-07-25 PROCEDURE — 90040 PR PRENATAL FOLLOW UP: CPT | Performed by: OBSTETRICS & GYNECOLOGY

## 2019-07-25 NOTE — PROGRESS NOTES
Blakemichael MAURY Gonzalez, a  at 37w3d with an JATINDER of 2019, by Ultrasound, was seen at [facility] for a nonstress test.    Nonstress Test  Reason for NST: Diabetes  Variability: Moderate  Decelerations: None  Accelerations: Yes  Baseline: 130  Uterine Irritability: No  Contractions: Not present  Nonstress Test Interpretation  Comments: Reactive NST per my read-Dr. Quinn

## 2019-07-29 ENCOUNTER — ROUTINE PRENATAL (OUTPATIENT)
Dept: OBGYN | Facility: CLINIC | Age: 29
End: 2019-07-29
Payer: COMMERCIAL

## 2019-07-29 DIAGNOSIS — O24.419 GDM, CLASS A2: ICD-10-CM

## 2019-07-29 LAB
NST ACOUSTIC STIMULATION: NO
NST ACTION NECESSARY: NORMAL
NST ASSESSMENT: NORMAL
NST BASELINE: NORMAL
NST INDICATIONS: NORMAL
NST OTHER DATA: NORMAL
NST READ BY: NORMAL
NST RETURN: NORMAL
NST UTERINE ACTIVITY: NO

## 2019-07-29 PROCEDURE — 59025 FETAL NON-STRESS TEST: CPT | Performed by: OBSTETRICS & GYNECOLOGY

## 2019-07-31 NOTE — PROGRESS NOTES
CS     LABOR AND DELIVERY HISTORY AND PHYSICAL    PATIENT ID:  NAME:  Angelina Gonzalez  MRN:               [id number]  YOB: 1990    CC:  Pre op prior to CS    HPI:  Angelina Gonzalez is a 28 y.o.  at 38w2d by a 7 week US. Estimated Date of Delivery: 19.  Patient here for routine OB visit.  Reports that she has been doing well since being seen last.  Denies any headache, vision changes, right upper quadrant pain, regular contractions, vaginal bleeding, loss of fluid.  Endorses fetal movement.  Reports she has been checking her blood pressures at home and they have been within normal limits.  Is very ready for  next week.     ROS: Patient denies any fever chills, nausea, vomiting, headache, chest pain, shortness of breath, or dysuria or unusual swelling of hands or feet.     Problems this Pregnancy:  --DM2  --h/o PreE  --h/o CS @ 32w for PreE w/SF    Prenatal Care: Obtained at [facility], 1st visit 7w with 20+ total visits.  Third trimester BPs were approximately 130s/80s.    Prenatal Labs:   HepBsAg: NR HIV: neg Rubella: Imm   RPR: NR PAP: neg 19 GBS: pos   GC/CT: neg/neg O+/ Ab neg Quad Screen:    Baseline urine protein: 82g  Baseline Cr 0.69      OB Hx:  OB History    Para Term  AB Living   2 1   1   1   SAB TAB Ectopic Molar Multiple Live Births             1      # Outcome Date GA Lbr Augustus/2nd Weight Sex Delivery Anes PTL Lv   2 Current            1  17 32w2d  1.43 kg (3 lb 2.4 oz) F CS-LTranv Spinal Y LOLI      Birth Comments: Pt had preeclapmsia        GYN History:  Menarche @12.  Menses irregular, lasting 4-7days, not particularly heavy.  no h/o abnormal pap, nor history of cone biopsy, LEEP or any other cervical, uterine or gynecologic surgery. No history of sexually transmitted diseases.  Prior contraception: Nothing, desires Mirena.    PMH/Problem List:    Past Medical History:   Diagnosis Date   • Diabetes (HCC)    • Hypertension      Patient Active  Problem List    Diagnosis Date Noted   • GBS (group B Streptococcus carrier), +RV culture, currently pregnant 07/15/2019   • GDM, class A2 2019   • Hx of preeclampsia, prior pregnancy, currently pregnant, third trimester 2019   • High-risk pregnancy supervision, third trimester 2019   • Modified White class C pregestational diabetes mellitus 2019   • History of  delivery-documented LTCS 12/15/2017       Current Outpatient Medications:  Current Outpatient Medications on File Prior to Visit   Medication Sig Dispense Refill   • glucose blood (ONETOUCH VERIO) strip Please dispense onetouch verio test strips. Patient to check blood sugar 4 times a day. 120 Strip 6   • metFORMIN (GLUCOPHAGE) 500 MG Tab Take 1 Tab by mouth 2 times a day, with meals. 60 Tab 5   • Blood Glucose Monitoring Suppl Misc Please dispense test strips for glucometer One Touch Verio. Patient to check blood sugars 4 times a day. 120 Strip 4   • B-D ULTRA FINE LANCETS Misc Please dispense lancets for glucometer One Touch Verio. Patient to check blood sugars 4 times a day. 120 Each 4   • Insulin Syringes, Disposable, U-100 0.5 ML Misc Inject insulin as directed 100 Each 4   • Prenatal MV-Min-Fe Fum-FA-DHA (PRENATAL 1 PO) Take  by mouth.       No current facility-administered medications on file prior to visit.        PSH:    Past Surgical History:   Procedure Laterality Date   • PRIMARY C SECTION  2017    Procedure: PRIMARY C SECTION;  Surgeon: Goran Webb M.D.;  Location: LABOR AND DELIVERY;  Service: Gynecology       Allergies:   No Known Allergies    SH:  Social History     Socioeconomic History   • Marital status:      Spouse name: Not on file   • Number of children: Not on file   • Years of education: Not on file   • Highest education level: Not on file   Occupational History   • Not on file   Social Needs   • Financial resource strain: Not on file   • Food insecurity:     Worry: Not on file      Inability: Not on file   • Transportation needs:     Medical: Not on file     Non-medical: Not on file   Tobacco Use   • Smoking status: Never Smoker   • Smokeless tobacco: Never Used   Substance and Sexual Activity   • Alcohol use: No   • Drug use: No   • Sexual activity: Yes     Partners: Male     Comment: planned pregnancy    Lifestyle   • Physical activity:     Days per week: Not on file     Minutes per session: Not on file   • Stress: Not on file   Relationships   • Social connections:     Talks on phone: Not on file     Gets together: Not on file     Attends [employer] service: Not on file     Active member of club or organization: Not on file     Attends meetings of clubs or organizations: Not on file     Relationship status: Not on file   • Intimate partner violence:     Fear of current or ex partner: Not on file     Emotionally abused: Not on file     Physically abused: Not on file     Forced sexual activity: Not on file   Other Topics Concern   • Not on file   Social History Narrative   • Not on file         PHYSICAL EXAM:  There were no vitals filed for this visit.  @PNTMAX(24)@  General: No acute distress, resting comfortably in bed.  HEENT: normocephalic, nontraumatic, PERRLA, EOMI  Cardiovascular: RRR with no murmurs, rubs or gallops. Distal Pulses 2+  Respiratory: nonlabored breathing on RA, lungs CTAB  Abdomen: gravid, nontender  Musculoskeletal: strength 5/5 in four extremities  Neuro: non focal with no numbness, tingling or changes in sensation    NST reactive today    Discussed with the patient the benefits, risks and alternative of  delivery. Risks include but are not limited to pain, infection, bleeding with possible need for transfusion, scarring, damage to surrounding structures.  Specifically organs that can be damaged are bowel, bladder, ureters, and nerves. I also discussed with the patient the risk of wound infection and wound breakdown. We discussed that these risks are greater in  people that have diabetes or obesity. I also discussed the risk of emergency blood transfusion, rare risk of emergency hysterectomy or death.  Patient had the opportunity to ask questions. All questions were answered to the patient's satisfaction.  She reported understanding and signed consent.    A/P: 28 y.o.  @ 38w2d by 7-week ultrasound   #History of  section desiring repeat.  Preoperative appointment performed today.  Risks discussed as above.  Patient is aware that she should be n.p.o. following midnight the night before surgery except for water, she needs to arrive at the hospital 2 hours prior to scheduled surgery, and should shower the night before and morning of surgery.  Reports that her questions have been answered to her satisfaction.  #DM2.  Forgot book so unable to check sugars today.  Patient on metformin 500 mg twice daily.  We will continue.  #History of preeclampsia with severe features.  Baseline labs as noted above.  No signs and symptoms today and blood pressure within normal limits.  Patient aware of signs and symptoms-precautions reviewed.  #GBS: positive  #Postpartum planning.  Plans to breastfeed-discussed lactation and donor milk services today.  Contraception: Neyda Quinn D.O.  [facility], [facility]

## 2019-08-01 ENCOUNTER — ROUTINE PRENATAL (OUTPATIENT)
Dept: OBGYN | Facility: CLINIC | Age: 29
End: 2019-08-01

## 2019-08-01 ENCOUNTER — ROUTINE PRENATAL (OUTPATIENT)
Dept: OBGYN | Facility: CLINIC | Age: 29
End: 2019-08-01
Payer: COMMERCIAL

## 2019-08-01 VITALS — WEIGHT: 181 LBS | BODY MASS INDEX: 35.35 KG/M2 | DIASTOLIC BLOOD PRESSURE: 89 MMHG | SYSTOLIC BLOOD PRESSURE: 137 MMHG

## 2019-08-01 DIAGNOSIS — O99.820 GBS (GROUP B STREPTOCOCCUS CARRIER), +RV CULTURE, CURRENTLY PREGNANT: ICD-10-CM

## 2019-08-01 DIAGNOSIS — O24.419 GDM, CLASS A2: ICD-10-CM

## 2019-08-01 DIAGNOSIS — Z98.891 HISTORY OF CESAREAN DELIVERY: ICD-10-CM

## 2019-08-01 DIAGNOSIS — O09.293 HX OF PREECLAMPSIA, PRIOR PREGNANCY, CURRENTLY PREGNANT, THIRD TRIMESTER: ICD-10-CM

## 2019-08-01 DIAGNOSIS — O24.319 MODIFIED WHITE CLASS C PREGESTATIONAL DIABETES MELLITUS: ICD-10-CM

## 2019-08-01 DIAGNOSIS — O09.93 HIGH-RISK PREGNANCY SUPERVISION, THIRD TRIMESTER: ICD-10-CM

## 2019-08-01 LAB — GLUCOSE BLD-MCNC: 104 MG/DL (ref 70–100)

## 2019-08-01 PROCEDURE — 82962 GLUCOSE BLOOD TEST: CPT | Performed by: OBSTETRICS & GYNECOLOGY

## 2019-08-01 PROCEDURE — 90040 PR PRENATAL FOLLOW UP: CPT | Performed by: OBSTETRICS & GYNECOLOGY

## 2019-08-01 PROCEDURE — 59025 FETAL NON-STRESS TEST: CPT | Performed by: OBSTETRICS & GYNECOLOGY

## 2019-08-01 NOTE — PROGRESS NOTES
Pt here today for OB follow up  Pt states no complaints   Reports +FM  Good # [phone]  Pharmacy Confirmed.  Chaperone offered and not indicated.   Random Accucheck:  Diabetic supplies:None needed today.  GBS positive.  Pt has C/S scheduled for 8/6/19. Pt aware of date and time.  Pt forgot book.

## 2019-08-01 NOTE — PROGRESS NOTES
Blakemichael MAURY Gonzalez, a  at 38w3d with an JATINDER of 2019, by Ultrasound, was seen at [facility] for a nonstress test.    Nonstress Test  Reason for NST: Diabetes  Variability: Moderate  Decelerations: None  Accelerations: Yes  Baseline: 130  Uterine Irritability: No  Contractions: Not present  Nonstress Test Interpretation  Comments: Reactive NST per my read-Dr. Quinn

## 2019-08-06 ENCOUNTER — ANESTHESIA (OUTPATIENT)
Dept: OBGYN | Facility: MEDICAL CENTER | Age: 29
End: 2019-08-06
Payer: COMMERCIAL

## 2019-08-06 ENCOUNTER — HOSPITAL ENCOUNTER (INPATIENT)
Facility: MEDICAL CENTER | Age: 29
LOS: 3 days | End: 2019-08-09
Attending: OBSTETRICS & GYNECOLOGY | Admitting: OBSTETRICS & GYNECOLOGY
Payer: COMMERCIAL

## 2019-08-06 ENCOUNTER — ANESTHESIA EVENT (OUTPATIENT)
Dept: OBGYN | Facility: MEDICAL CENTER | Age: 29
End: 2019-08-06
Payer: COMMERCIAL

## 2019-08-06 DIAGNOSIS — Z64.1 MULTIPARITY: ICD-10-CM

## 2019-08-06 LAB
ALBUMIN SERPL BCP-MCNC: 3.5 G/DL (ref 3.2–4.9)
ALBUMIN/GLOB SERPL: 1.3 G/DL
ALP SERPL-CCNC: 189 U/L (ref 30–99)
ALT SERPL-CCNC: 19 U/L (ref 2–50)
ANION GAP SERPL CALC-SCNC: 11 MMOL/L (ref 0–11.9)
AST SERPL-CCNC: 25 U/L (ref 12–45)
BASOPHILS # BLD AUTO: 0.5 % (ref 0–1.8)
BASOPHILS # BLD: 0.06 K/UL (ref 0–0.12)
BILIRUB SERPL-MCNC: 0.4 MG/DL (ref 0.1–1.5)
BUN SERPL-MCNC: 13 MG/DL (ref 8–22)
CALCIUM SERPL-MCNC: 8.3 MG/DL (ref 8.5–10.5)
CHLORIDE SERPL-SCNC: 108 MMOL/L (ref 96–112)
CO2 SERPL-SCNC: 19 MMOL/L (ref 20–33)
CREAT SERPL-MCNC: 0.75 MG/DL (ref 0.5–1.4)
EOSINOPHIL # BLD AUTO: 0.2 K/UL (ref 0–0.51)
EOSINOPHIL NFR BLD: 1.7 % (ref 0–6.9)
ERYTHROCYTE [DISTWIDTH] IN BLOOD BY AUTOMATED COUNT: 44.7 FL (ref 35.9–50)
ERYTHROCYTE [DISTWIDTH] IN BLOOD BY AUTOMATED COUNT: 45.3 FL (ref 35.9–50)
GLOBULIN SER CALC-MCNC: 2.6 G/DL (ref 1.9–3.5)
GLUCOSE BLD-MCNC: 71 MG/DL (ref 65–99)
GLUCOSE SERPL-MCNC: 72 MG/DL (ref 65–99)
HCT VFR BLD AUTO: 37 % (ref 37–47)
HCT VFR BLD AUTO: 39.4 % (ref 37–47)
HGB BLD-MCNC: 12.3 G/DL (ref 12–16)
HGB BLD-MCNC: 13.2 G/DL (ref 12–16)
HOLDING TUBE BB 8507: NORMAL
IMM GRANULOCYTES # BLD AUTO: 0.06 K/UL (ref 0–0.11)
IMM GRANULOCYTES NFR BLD AUTO: 0.5 % (ref 0–0.9)
LYMPHOCYTES # BLD AUTO: 3.39 K/UL (ref 1–4.8)
LYMPHOCYTES NFR BLD: 29.2 % (ref 22–41)
MCH RBC QN AUTO: 29.1 PG (ref 27–33)
MCH RBC QN AUTO: 29.3 PG (ref 27–33)
MCHC RBC AUTO-ENTMCNC: 33.2 G/DL (ref 33.6–35)
MCHC RBC AUTO-ENTMCNC: 33.5 G/DL (ref 33.6–35)
MCV RBC AUTO: 87.4 FL (ref 81.4–97.8)
MCV RBC AUTO: 87.5 FL (ref 81.4–97.8)
MONOCYTES # BLD AUTO: 0.75 K/UL (ref 0–0.85)
MONOCYTES NFR BLD AUTO: 6.5 % (ref 0–13.4)
NEUTROPHILS # BLD AUTO: 7.13 K/UL (ref 2–7.15)
NEUTROPHILS NFR BLD: 61.6 % (ref 44–72)
NRBC # BLD AUTO: 0 K/UL
NRBC BLD-RTO: 0 /100 WBC
PLATELET # BLD AUTO: 244 K/UL (ref 164–446)
PLATELET # BLD AUTO: 254 K/UL (ref 164–446)
PMV BLD AUTO: 10.5 FL (ref 9–12.9)
PMV BLD AUTO: 10.7 FL (ref 9–12.9)
POTASSIUM SERPL-SCNC: 3.9 MMOL/L (ref 3.6–5.5)
PROT SERPL-MCNC: 6.1 G/DL (ref 6–8.2)
RBC # BLD AUTO: 4.23 M/UL (ref 4.2–5.4)
RBC # BLD AUTO: 4.51 M/UL (ref 4.2–5.4)
SODIUM SERPL-SCNC: 138 MMOL/L (ref 135–145)
WBC # BLD AUTO: 11.6 K/UL (ref 4.8–10.8)
WBC # BLD AUTO: 16.1 K/UL (ref 4.8–10.8)

## 2019-08-06 PROCEDURE — 85025 COMPLETE CBC W/AUTO DIFF WBC: CPT

## 2019-08-06 PROCEDURE — 59514 CESAREAN DELIVERY ONLY: CPT

## 2019-08-06 PROCEDURE — 700105 HCHG RX REV CODE 258: Performed by: OBSTETRICS & GYNECOLOGY

## 2019-08-06 PROCEDURE — 700111 HCHG RX REV CODE 636 W/ 250 OVERRIDE (IP): Performed by: ANESTHESIOLOGY

## 2019-08-06 PROCEDURE — 770002 HCHG ROOM/CARE - OB PRIVATE (112)

## 2019-08-06 PROCEDURE — A9270 NON-COVERED ITEM OR SERVICE: HCPCS | Performed by: NURSE PRACTITIONER

## 2019-08-06 PROCEDURE — 80053 COMPREHEN METABOLIC PANEL: CPT

## 2019-08-06 PROCEDURE — 306828 HCHG ANES-TIME GENERAL: Performed by: OBSTETRICS & GYNECOLOGY

## 2019-08-06 PROCEDURE — A9270 NON-COVERED ITEM OR SERVICE: HCPCS | Performed by: ANESTHESIOLOGY

## 2019-08-06 PROCEDURE — 85027 COMPLETE CBC AUTOMATED: CPT

## 2019-08-06 PROCEDURE — 700102 HCHG RX REV CODE 250 W/ 637 OVERRIDE(OP): Performed by: ANESTHESIOLOGY

## 2019-08-06 PROCEDURE — 700112 HCHG RX REV CODE 229: Performed by: OBSTETRICS & GYNECOLOGY

## 2019-08-06 PROCEDURE — 59510 CESAREAN DELIVERY: CPT | Performed by: OBSTETRICS & GYNECOLOGY

## 2019-08-06 PROCEDURE — 305385 HCHG SURGICAL SERVICES 1/4 HOUR: Performed by: OBSTETRICS & GYNECOLOGY

## 2019-08-06 PROCEDURE — A9270 NON-COVERED ITEM OR SERVICE: HCPCS | Performed by: OBSTETRICS & GYNECOLOGY

## 2019-08-06 PROCEDURE — 700111 HCHG RX REV CODE 636 W/ 250 OVERRIDE (IP): Performed by: OBSTETRICS & GYNECOLOGY

## 2019-08-06 PROCEDURE — 59025 FETAL NON-STRESS TEST: CPT

## 2019-08-06 PROCEDURE — 700105 HCHG RX REV CODE 258: Performed by: ANESTHESIOLOGY

## 2019-08-06 PROCEDURE — 59514 CESAREAN DELIVERY ONLY: CPT | Mod: AS | Performed by: NURSE PRACTITIONER

## 2019-08-06 PROCEDURE — 500429 HCHG DRESSING, INTERCEED

## 2019-08-06 PROCEDURE — 700102 HCHG RX REV CODE 250 W/ 637 OVERRIDE(OP): Performed by: NURSE PRACTITIONER

## 2019-08-06 PROCEDURE — 304964 HCHG RECOVERY ROOM TIME 1HR: Performed by: OBSTETRICS & GYNECOLOGY

## 2019-08-06 PROCEDURE — 82962 GLUCOSE BLOOD TEST: CPT

## 2019-08-06 PROCEDURE — 700101 HCHG RX REV CODE 250: Performed by: ANESTHESIOLOGY

## 2019-08-06 PROCEDURE — 700111 HCHG RX REV CODE 636 W/ 250 OVERRIDE (IP)

## 2019-08-06 PROCEDURE — 36415 COLL VENOUS BLD VENIPUNCTURE: CPT

## 2019-08-06 RX ORDER — CITRIC ACID/SODIUM CITRATE 334-500MG
30 SOLUTION, ORAL ORAL ONCE
Status: COMPLETED | OUTPATIENT
Start: 2019-08-06 | End: 2019-08-06

## 2019-08-06 RX ORDER — ONDANSETRON 2 MG/ML
INJECTION INTRAMUSCULAR; INTRAVENOUS PRN
Status: DISCONTINUED | OUTPATIENT
Start: 2019-08-06 | End: 2019-08-06 | Stop reason: SURG

## 2019-08-06 RX ORDER — SODIUM CHLORIDE, SODIUM GLUCONATE, SODIUM ACETATE, POTASSIUM CHLORIDE AND MAGNESIUM CHLORIDE 526; 502; 368; 37; 30 MG/100ML; MG/100ML; MG/100ML; MG/100ML; MG/100ML
INJECTION, SOLUTION INTRAVENOUS
Status: DISCONTINUED | OUTPATIENT
Start: 2019-08-06 | End: 2019-08-06 | Stop reason: SURG

## 2019-08-06 RX ORDER — SODIUM CHLORIDE, SODIUM GLUCONATE, SODIUM ACETATE, POTASSIUM CHLORIDE AND MAGNESIUM CHLORIDE 526; 502; 368; 37; 30 MG/100ML; MG/100ML; MG/100ML; MG/100ML; MG/100ML
1500 INJECTION, SOLUTION INTRAVENOUS ONCE
Status: COMPLETED | OUTPATIENT
Start: 2019-08-06 | End: 2019-08-06

## 2019-08-06 RX ORDER — DIPHENHYDRAMINE HYDROCHLORIDE 50 MG/ML
25 INJECTION INTRAMUSCULAR; INTRAVENOUS EVERY 6 HOURS PRN
Status: ACTIVE | OUTPATIENT
Start: 2019-08-06 | End: 2019-08-07

## 2019-08-06 RX ORDER — OXYCODONE HYDROCHLORIDE 10 MG/1
10 TABLET ORAL EVERY 4 HOURS PRN
Status: ACTIVE | OUTPATIENT
Start: 2019-08-06 | End: 2019-08-07

## 2019-08-06 RX ORDER — MORPHINE SULFATE 2 MG/ML
INJECTION, SOLUTION INTRAMUSCULAR; INTRAVENOUS
Status: COMPLETED | OUTPATIENT
Start: 2019-08-06 | End: 2019-08-06

## 2019-08-06 RX ORDER — CEFAZOLIN SODIUM 1 G/3ML
1 INJECTION, POWDER, FOR SOLUTION INTRAMUSCULAR; INTRAVENOUS ONCE
Status: COMPLETED | OUTPATIENT
Start: 2019-08-06 | End: 2019-08-06

## 2019-08-06 RX ORDER — OXYCODONE HYDROCHLORIDE AND ACETAMINOPHEN 5; 325 MG/1; MG/1
1 TABLET ORAL
Status: CANCELLED | OUTPATIENT
Start: 2019-08-06

## 2019-08-06 RX ORDER — DIPHENHYDRAMINE HYDROCHLORIDE 50 MG/ML
12.5 INJECTION INTRAMUSCULAR; INTRAVENOUS EVERY 6 HOURS PRN
Status: ACTIVE | OUTPATIENT
Start: 2019-08-06 | End: 2019-08-07

## 2019-08-06 RX ORDER — OXYCODONE HYDROCHLORIDE AND ACETAMINOPHEN 5; 325 MG/1; MG/1
2 TABLET ORAL
Status: CANCELLED | OUTPATIENT
Start: 2019-08-06

## 2019-08-06 RX ORDER — ACETAMINOPHEN 500 MG
1000 TABLET ORAL EVERY 6 HOURS
Status: COMPLETED | OUTPATIENT
Start: 2019-08-06 | End: 2019-08-07

## 2019-08-06 RX ORDER — MISOPROSTOL 200 UG/1
800 TABLET ORAL
Status: CANCELLED | OUTPATIENT
Start: 2019-08-06

## 2019-08-06 RX ORDER — SODIUM CHLORIDE, SODIUM LACTATE, POTASSIUM CHLORIDE, CALCIUM CHLORIDE 600; 310; 30; 20 MG/100ML; MG/100ML; MG/100ML; MG/100ML
INJECTION, SOLUTION INTRAVENOUS CONTINUOUS
Status: DISCONTINUED | OUTPATIENT
Start: 2019-08-06 | End: 2019-08-09 | Stop reason: HOSPADM

## 2019-08-06 RX ORDER — HYDROMORPHONE HYDROCHLORIDE 1 MG/ML
0.4 INJECTION, SOLUTION INTRAMUSCULAR; INTRAVENOUS; SUBCUTANEOUS
Status: ACTIVE | OUTPATIENT
Start: 2019-08-06 | End: 2019-08-07

## 2019-08-06 RX ORDER — DEXAMETHASONE SODIUM PHOSPHATE 4 MG/ML
INJECTION, SOLUTION INTRA-ARTICULAR; INTRALESIONAL; INTRAMUSCULAR; INTRAVENOUS; SOFT TISSUE PRN
Status: DISCONTINUED | OUTPATIENT
Start: 2019-08-06 | End: 2019-08-06 | Stop reason: SURG

## 2019-08-06 RX ORDER — HALOPERIDOL 5 MG/ML
1 INJECTION INTRAMUSCULAR
Status: CANCELLED | OUTPATIENT
Start: 2019-08-06

## 2019-08-06 RX ORDER — METHYLERGONOVINE MALEATE 0.2 MG/ML
0.2 INJECTION INTRAVENOUS
Status: DISCONTINUED | OUTPATIENT
Start: 2019-08-06 | End: 2019-08-06

## 2019-08-06 RX ORDER — MISOPROSTOL 200 UG/1
800 TABLET ORAL
Status: DISCONTINUED | OUTPATIENT
Start: 2019-08-06 | End: 2019-08-09 | Stop reason: HOSPADM

## 2019-08-06 RX ORDER — SODIUM CHLORIDE, SODIUM LACTATE, POTASSIUM CHLORIDE, CALCIUM CHLORIDE 600; 310; 30; 20 MG/100ML; MG/100ML; MG/100ML; MG/100ML
INJECTION, SOLUTION INTRAVENOUS PRN
Status: DISCONTINUED | OUTPATIENT
Start: 2019-08-06 | End: 2019-08-09 | Stop reason: HOSPADM

## 2019-08-06 RX ORDER — ASPIRIN 81 MG/1
81 TABLET, CHEWABLE ORAL DAILY
Status: ON HOLD | COMMUNITY
End: 2019-08-09

## 2019-08-06 RX ORDER — SODIUM CHLORIDE, SODIUM LACTATE, POTASSIUM CHLORIDE, CALCIUM CHLORIDE 600; 310; 30; 20 MG/100ML; MG/100ML; MG/100ML; MG/100ML
INJECTION, SOLUTION INTRAVENOUS CONTINUOUS
Status: CANCELLED | OUTPATIENT
Start: 2019-08-06

## 2019-08-06 RX ORDER — ONDANSETRON 2 MG/ML
INJECTION INTRAMUSCULAR; INTRAVENOUS
Status: COMPLETED
Start: 2019-08-06 | End: 2019-08-06

## 2019-08-06 RX ORDER — METOCLOPRAMIDE HYDROCHLORIDE 5 MG/ML
10 INJECTION INTRAMUSCULAR; INTRAVENOUS ONCE
Status: COMPLETED | OUTPATIENT
Start: 2019-08-06 | End: 2019-08-06

## 2019-08-06 RX ORDER — METHYLERGONOVINE MALEATE 0.2 MG/ML
0.2 INJECTION INTRAVENOUS
Status: CANCELLED | OUTPATIENT
Start: 2019-08-06

## 2019-08-06 RX ORDER — OXYCODONE HYDROCHLORIDE 5 MG/1
5 TABLET ORAL EVERY 4 HOURS PRN
Status: DISPENSED | OUTPATIENT
Start: 2019-08-06 | End: 2019-08-07

## 2019-08-06 RX ORDER — BUPIVACAINE HYDROCHLORIDE 7.5 MG/ML
INJECTION, SOLUTION INTRASPINAL
Status: COMPLETED | OUTPATIENT
Start: 2019-08-06 | End: 2019-08-06

## 2019-08-06 RX ORDER — HYDROMORPHONE HYDROCHLORIDE 1 MG/ML
0.2 INJECTION, SOLUTION INTRAMUSCULAR; INTRAVENOUS; SUBCUTANEOUS
Status: ACTIVE | OUTPATIENT
Start: 2019-08-06 | End: 2019-08-07

## 2019-08-06 RX ORDER — KETOROLAC TROMETHAMINE 30 MG/ML
30 INJECTION, SOLUTION INTRAMUSCULAR; INTRAVENOUS EVERY 6 HOURS
Status: COMPLETED | OUTPATIENT
Start: 2019-08-06 | End: 2019-08-07

## 2019-08-06 RX ORDER — ONDANSETRON 2 MG/ML
4 INJECTION INTRAMUSCULAR; INTRAVENOUS
Status: CANCELLED | OUTPATIENT
Start: 2019-08-06

## 2019-08-06 RX ORDER — OXYTOCIN 10 [USP'U]/ML
INJECTION, SOLUTION INTRAMUSCULAR; INTRAVENOUS PRN
Status: DISCONTINUED | OUTPATIENT
Start: 2019-08-06 | End: 2019-08-06 | Stop reason: SURG

## 2019-08-06 RX ORDER — DIPHENHYDRAMINE HYDROCHLORIDE 50 MG/ML
12.5 INJECTION INTRAMUSCULAR; INTRAVENOUS
Status: CANCELLED | OUTPATIENT
Start: 2019-08-06

## 2019-08-06 RX ORDER — MEPERIDINE HYDROCHLORIDE 25 MG/ML
6.25 INJECTION INTRAMUSCULAR; INTRAVENOUS; SUBCUTANEOUS
Status: CANCELLED | OUTPATIENT
Start: 2019-08-06

## 2019-08-06 RX ORDER — ONDANSETRON 2 MG/ML
4 INJECTION INTRAMUSCULAR; INTRAVENOUS EVERY 6 HOURS PRN
Status: ACTIVE | OUTPATIENT
Start: 2019-08-06 | End: 2019-08-07

## 2019-08-06 RX ORDER — KETOROLAC TROMETHAMINE 30 MG/ML
INJECTION, SOLUTION INTRAMUSCULAR; INTRAVENOUS PRN
Status: DISCONTINUED | OUTPATIENT
Start: 2019-08-06 | End: 2019-08-06 | Stop reason: SURG

## 2019-08-06 RX ORDER — DOCUSATE SODIUM 100 MG/1
100 CAPSULE, LIQUID FILLED ORAL 2 TIMES DAILY PRN
Status: DISCONTINUED | OUTPATIENT
Start: 2019-08-06 | End: 2019-08-09 | Stop reason: HOSPADM

## 2019-08-06 RX ORDER — OXYTOCIN 10 [USP'U]/ML
10 INJECTION, SOLUTION INTRAMUSCULAR; INTRAVENOUS ONCE
Status: CANCELLED | OUTPATIENT
Start: 2019-08-06 | End: 2019-08-06

## 2019-08-06 RX ORDER — SODIUM CHLORIDE, SODIUM GLUCONATE, SODIUM ACETATE, POTASSIUM CHLORIDE AND MAGNESIUM CHLORIDE 526; 502; 368; 37; 30 MG/100ML; MG/100ML; MG/100ML; MG/100ML; MG/100ML
500 INJECTION, SOLUTION INTRAVENOUS CONTINUOUS
Status: CANCELLED | OUTPATIENT
Start: 2019-08-06

## 2019-08-06 RX ADMIN — EPHEDRINE SULFATE 10 MG: 50 INJECTION INTRAMUSCULAR; INTRAVENOUS; SUBCUTANEOUS at 09:52

## 2019-08-06 RX ADMIN — OXYCODONE HYDROCHLORIDE 5 MG: 5 TABLET ORAL at 20:14

## 2019-08-06 RX ADMIN — KETOROLAC TROMETHAMINE 30 MG: 30 INJECTION, SOLUTION INTRAMUSCULAR at 10:26

## 2019-08-06 RX ADMIN — DEXAMETHASONE SODIUM PHOSPHATE 4 MG: 4 INJECTION, SOLUTION INTRA-ARTICULAR; INTRALESIONAL; INTRAMUSCULAR; INTRAVENOUS; SOFT TISSUE at 10:08

## 2019-08-06 RX ADMIN — Medication 20 UNITS: at 11:01

## 2019-08-06 RX ADMIN — ACETAMINOPHEN 1000 MG: 500 TABLET ORAL at 13:59

## 2019-08-06 RX ADMIN — DOCUSATE SODIUM 100 MG: 100 CAPSULE, LIQUID FILLED ORAL at 20:14

## 2019-08-06 RX ADMIN — MORPHINE SULFATE 0.2 MG: 2 INJECTION, SOLUTION INTRAMUSCULAR; INTRAVENOUS at 09:54

## 2019-08-06 RX ADMIN — SODIUM CHLORIDE, SODIUM GLUCONATE, SODIUM ACETATE, POTASSIUM CHLORIDE AND MAGNESIUM CHLORIDE: 526; 502; 368; 37; 30 INJECTION, SOLUTION INTRAVENOUS at 09:39

## 2019-08-06 RX ADMIN — OXYTOCIN 20 UNITS: 10 INJECTION, SOLUTION INTRAMUSCULAR; INTRAVENOUS at 10:08

## 2019-08-06 RX ADMIN — ACETAMINOPHEN 1000 MG: 500 TABLET ORAL at 20:13

## 2019-08-06 RX ADMIN — KETOROLAC TROMETHAMINE 30 MG: 30 INJECTION, SOLUTION INTRAMUSCULAR at 16:11

## 2019-08-06 RX ADMIN — SODIUM CHLORIDE, POTASSIUM CHLORIDE, SODIUM LACTATE AND CALCIUM CHLORIDE: 600; 310; 30; 20 INJECTION, SOLUTION INTRAVENOUS at 16:39

## 2019-08-06 RX ADMIN — METFORMIN HYDROCHLORIDE 500 MG: 500 TABLET, FILM COATED ORAL at 17:35

## 2019-08-06 RX ADMIN — FAMOTIDINE 20 MG: 10 INJECTION INTRAVENOUS at 09:03

## 2019-08-06 RX ADMIN — BUPIVACAINE HYDROCHLORIDE IN DEXTROSE 1.8 ML: 7.5 INJECTION, SOLUTION SUBARACHNOID at 09:54

## 2019-08-06 RX ADMIN — KETOROLAC TROMETHAMINE 30 MG: 30 INJECTION, SOLUTION INTRAMUSCULAR at 22:24

## 2019-08-06 RX ADMIN — SODIUM CITRATE AND CITRIC ACID MONOHYDRATE 30 ML: 500; 334 SOLUTION ORAL at 09:03

## 2019-08-06 RX ADMIN — METOCLOPRAMIDE 10 MG: 5 INJECTION, SOLUTION INTRAMUSCULAR; INTRAVENOUS at 09:03

## 2019-08-06 RX ADMIN — EPHEDRINE SULFATE 10 MG: 50 INJECTION INTRAMUSCULAR; INTRAVENOUS; SUBCUTANEOUS at 10:03

## 2019-08-06 RX ADMIN — SODIUM CHLORIDE, SODIUM GLUCONATE, SODIUM ACETATE, POTASSIUM CHLORIDE AND MAGNESIUM CHLORIDE 1500 ML: 526; 502; 368; 37; 30 INJECTION, SOLUTION INTRAVENOUS at 07:50

## 2019-08-06 RX ADMIN — CEFAZOLIN 2 G: 330 INJECTION, POWDER, FOR SOLUTION INTRAMUSCULAR; INTRAVENOUS at 09:48

## 2019-08-06 RX ADMIN — ONDANSETRON 4 MG: 2 INJECTION INTRAMUSCULAR; INTRAVENOUS at 11:42

## 2019-08-06 RX ADMIN — ONDANSETRON 4 MG: 2 INJECTION INTRAMUSCULAR; INTRAVENOUS at 10:10

## 2019-08-06 ASSESSMENT — PAIN SCALES - GENERAL
PAIN_LEVEL: 0
PAINLEVEL: 0 - NO PAIN

## 2019-08-06 ASSESSMENT — PATIENT HEALTH QUESTIONNAIRE - PHQ9
SUM OF ALL RESPONSES TO PHQ9 QUESTIONS 1 AND 2: 0
2. FEELING DOWN, DEPRESSED, IRRITABLE, OR HOPELESS: NOT AT ALL
1. LITTLE INTEREST OR PLEASURE IN DOING THINGS: NOT AT ALL

## 2019-08-06 ASSESSMENT — COPD QUESTIONNAIRES
DURING THE PAST 4 WEEKS HOW MUCH DID YOU FEEL SHORT OF BREATH: NONE/LITTLE OF THE TIME
IN THE PAST 12 MONTHS DO YOU DO LESS THAN YOU USED TO BECAUSE OF YOUR BREATHING PROBLEMS: DISAGREE/UNSURE
DO YOU EVER COUGH UP ANY MUCUS OR PHLEGM?: NO/ONLY WITH OCCASIONAL COLDS OR INFECTIONS
HAVE YOU SMOKED AT LEAST 100 CIGARETTES IN YOUR ENTIRE LIFE: NO/DON'T KNOW
COPD SCREENING SCORE: 0

## 2019-08-06 ASSESSMENT — LIFESTYLE VARIABLES
TOTAL SCORE: 0
TOTAL SCORE: 0
EVER HAD A DRINK FIRST THING IN THE MORNING TO STEADY YOUR NERVES TO GET RID OF A HANGOVER: NO
HAVE YOU EVER FELT YOU SHOULD CUT DOWN ON YOUR DRINKING: NO
EVER_SMOKED: NEVER
CONSUMPTION TOTAL: INCOMPLETE
TOTAL SCORE: 0
ALCOHOL_USE: NO
EVER FELT BAD OR GUILTY ABOUT YOUR DRINKING: NO
HAVE PEOPLE ANNOYED YOU BY CRITICIZING YOUR DRINKING: NO

## 2019-08-06 NOTE — ANESTHESIA QCDR
2019 [facility] Clinical Data Registry (for Quality Improvement)     Postoperative nausea/vomiting risk protocol (Adult = 18 yrs and Pediatric 3-17 yrs)- (430 and 463)  General inhalation anesthetic (NOT TIVA) with PONV risk factors: Yes  Provision of anti-emetic therapy with at least 2 different classes of agents: Yes   Patient DID NOT receive anti-emetic therapy and reason is documented in Medical Record:  N/A    Multimodal Pain Management- (AQI59)  Patient undergoing Elective Surgery (i.e. Outpatient, or ASC, or Prescheduled Surgery prior to Hospital Admission): Yes  Use of Multimodal Pain Management, two or more drugs and/or interventions, NOT including systemic opioids: Yes   Exception: Documented allergy to multiple classes of analgesics:  N/A    PACU assessment of acute postoperative pain prior to Anesthesia Care End- Applies to Patients Age = 18- (ABG7)  Initial PACU pain score is which of the following: < 7/10  Patient unable to report pain score: N/A    Post-anesthetic transfer of care checklist/protocol to PACU/ICU- (426 and 427)  Upon conclusion of case, patient transferred to which of the following locations: PACU/Non-ICU  Use of transfer checklist/protocol: Yes  Exclusion: Service Performed in Patient Hospital Room (and thus did not require transfer): N/A    PACU Reintubation- (AQI31)  General anesthesia requiring endotracheal intubation (ETT) along with subsequent extubation in OR or PACU: No  Required reintubation in the PACU: N/A  Extubation was a planned trial documented in the medical record prior to removal of the original airway device: N/A    Unplanned admission to ICU related to anesthesia service up through end of PACU care- (MD51)  Unplanned admission to ICU (not initially anticipated at anesthesia start time): No

## 2019-08-06 NOTE — ANESTHESIA PROCEDURE NOTES
Spinal Block  Performed by: Destiny Salgado M.D.  Authorized by: Destiny Salgado M.D.     Patient Location:  OB  Start Time:  8/6/2019 9:31 AM  End Time:  8/6/2019 9:55 AM  Reason for Block: primary anesthetic    patient identified, IV checked, site marked, risks and benefits discussed, surgical consent, monitors and equipment checked, pre-op evaluation and timeout performed    Patient Position:  Sitting  Prep: ChloraPrep    Monitoring:  Blood pressure and continuous pulse oximetry  Approach:  Midline  Location:  L2-3  Injection Technique:  Single-shot  Skin infiltration:  Lidocaine  Strength:  1%  Dose:  3ml  Needle Type:  Pencan  Needle Gauge:  25 G  CSF flowing pre/post injection:  Yes  Sensory Level:  T6

## 2019-08-06 NOTE — ANESTHESIA PREPROCEDURE EVALUATION
Relevant Problems   NEURO   (+) Hx of preeclampsia, prior pregnancy, currently pregnant, third trimester      OB   (+) GDM, class A2   (+) History of  delivery-documented LTCS       Physical Exam    Airway   Mallampati: II  TM distance: >3 FB       Cardiovascular - normal exam     Dental    Pulmonary    Abdominal    Neurological              Anesthesia Plan    ASA 2       Plan - spinal   Neuraxial block will be primary anesthetic      Plan Factors:   Patient did not smoke on day of procedure.        Postoperative Plan: Postoperative administration of opioids is intended.        Informed Consent:    Anesthetic plan and risks discussed with patient and spouse.    Use of blood products discussed with: patient and spouse whom.

## 2019-08-06 NOTE — H&P
History and Physical    Angelina Jarquin is a 29 y.o. female  at 39w1d who presents for repeat scheduled  section today.  Patient states she has changed her mind about bilateral salpingectomy and desires to have an IUD placed postpartum.  Her pregnancy is complicated by diabetes mellitus.  Her blood sugars have been stable and well-controlled on 500 mg of metformin twice daily.    Subjective:   CTXs: negative   Pain: negative  LOF: negative  Vaginal bleeding: negative   Fetal movement: positive and regular    ROS: Pertinent positives documented in HPI and all other systems reviewed & are negative    OB History    Para Term  AB Living   2 1   1   1   SAB TAB Ectopic Molar Multiple Live Births             1      # Outcome Date GA Lbr Augustus/2nd Weight Sex Delivery Anes PTL Lv   2 Current            1  17 32w2d  1.43 kg (3 lb 2.4 oz) F CS-LTranv Spinal Y LOLI      Birth Comments: Pt had preeclapmsia        PGYNHx: She denies any history of sexually transmitted infections including HSV.  She denies any vulvovaginal disorders and has no history of abnormal cervical cytology.  Last Pap smear was in 2019 and was negative for intraepithelial lesion or malignancy.    Past Medical History:   Diagnosis Date   • Diabetes (HCC)    • Hypertension        Past Surgical History:   Procedure Laterality Date   • PRIMARY C SECTION  2017    Procedure: PRIMARY C SECTION;  Surgeon: Goran Webb M.D.;  Location: LABOR AND DELIVERY;  Service: Gynecology         Current Facility-Administered Medications:   •  Na citrate-citric acid (BICITRA) 500-334 MG/5ML solution 30 mL, 30 mL, Oral, Once, Destiny Salgado M.D.  •  famotidine (PEPCID) injection 20 mg, 20 mg, Intravenous, Once, Destiny Salgado M.D.  •  metoclopramide (REGLAN) injection 10 mg, 10 mg, Intravenous, Once, Destiny Salgado M.D.  •  lactated ringers (LR) infusion, , Intravenous, Continuous, Lexis Linton D.O.  •   ceFAZolin (ANCEF) injection 1 g, 1 g, Intravenous, Once, Lexis Linton D.O.    Allergies: Patient has no known allergies.    Social History     Socioeconomic History   • Marital status:      Spouse name: Not on file   • Number of children: Not on file   • Years of education: Not on file   • Highest education level: Not on file   Occupational History   • Not on file   Social Needs   • Financial resource strain: Not on file   • Food insecurity:     Worry: Not on file     Inability: Not on file   • Transportation needs:     Medical: Not on file     Non-medical: Not on file   Tobacco Use   • Smoking status: Never Smoker   • Smokeless tobacco: Never Used   Substance and Sexual Activity   • Alcohol use: No   • Drug use: No   • Sexual activity: Yes     Partners: Male     Comment: planned pregnancy    Lifestyle   • Physical activity:     Days per week: Not on file     Minutes per session: Not on file   • Stress: Not on file   Relationships   • Social connections:     Talks on phone: Not on file     Gets together: Not on file     Attends Religion service: Not on file     Active member of club or organization: Not on file     Attends meetings of clubs or organizations: Not on file     Relationship status: Not on file   • Intimate partner violence:     Fear of current or ex partner: Not on file     Emotionally abused: Not on file     Physically abused: Not on file     Forced sexual activity: Not on file   Other Topics Concern   • Not on file   Social History Narrative   • Not on file     FamHx:   Denies any genetic disorders    Prenatal care with [facility] with following problems:  Patient Active Problem List    Diagnosis Date Noted   • GBS (group B Streptococcus carrier), +RV culture, currently pregnant 07/15/2019   • GDM, class A2 06/13/2019   • Hx of preeclampsia, prior pregnancy, currently pregnant, third trimester 04/18/2019   • High-risk pregnancy supervision, third trimester 03/21/2019   • Modified White class C  pregestational diabetes mellitus 2019   • History of  delivery-documented LTCS 12/15/2017       Objective:      /90   Pulse 82   Temp 36.5 °C (97.7 °F) (Temporal)   Ht 1.524 m (5')   Wt 82.1 kg (181 lb)     General:   no acute distress, alert, cooperative   Skin:   normal   HEENT:  PERRLA   Lungs:   CTA bilateral   Heart:   chest is clear without rales or wheezing, no pedal edema   Abdomen:   soft, gravid, NT   EFW:  3200gr   Pelvis:  adequate with gynecoid pelvis   FHT:  130 BPM  Accels yes  Decels no  Variability moderate  Category I   Uterine Size: S=D   Presentations: Cephalic   Cervix: Not assessed     Lab Review  Lab:   Blood type: O     Recent Results (from the past 5880 hour(s))   POCT Pregnancy    Collection Time: 19  9:30 AM   Result Value Ref Range    POC Urine Pregnancy Test positive Negative    Internal Control Positive Positive     Internal Control Negative Negative    POCT Urinalysis    Collection Time: 19  9:30 AM   Result Value Ref Range    POC Color dark yellow Negative    POC Appearance cloudy Negative    POC Leukocyte Esterase small Negative    POC Nitrites negative Negative    POC Urobiligen  Negative (0.2) mg/dL    POC Protein trace Negative mg/dL    POC Urine PH 7.0 5.0 - 8.0    POC Blood negative Negative    POC Specific Gravity 1.020 <1.005 - >1.030    POC Ketones trace Negative mg/dL    POC Bilirubin  Negative mg/dL    POC Glucose 100 Negative mg/dL   THINPREP RFLX HPV ASCUS W/CTNG    Collection Time: 19 10:33 AM   Result Value Ref Range    Cytology Reg See Path Report     C. trachomatis by PCR Negative Negative    N. gonorrhoeae by PCR Negative Negative    Source Cervical    HEMOGLOBIN A1C    Collection Time: 19 12:17 PM   Result Value Ref Range    Glycohemoglobin 7.4 (H) 0.0 - 5.6 %    Est Avg Glucose 166 mg/dL   COMP METABOLIC PANEL    Collection Time: 19 12:17 PM   Result Value Ref Range    Sodium 134 (L) 135 - 145 mmol/L     Potassium 4.2 3.6 - 5.5 mmol/L    Chloride 105 96 - 112 mmol/L    Co2 23 20 - 33 mmol/L    Anion Gap 6.0 0.0 - 11.9    Glucose 94 65 - 99 mg/dL    Bun 10 8 - 22 mg/dL    Creatinine 0.69 0.50 - 1.40 mg/dL    Calcium 9.1 8.5 - 10.5 mg/dL    AST(SGOT) 21 12 - 45 U/L    ALT(SGPT) 24 2 - 50 U/L    Alkaline Phosphatase 48 30 - 99 U/L    Total Bilirubin 0.5 0.1 - 1.5 mg/dL    Albumin 3.9 3.2 - 4.9 g/dL    Total Protein 6.5 6.0 - 8.2 g/dL    Globulin 2.6 1.9 - 3.5 g/dL    A-G Ratio 1.5 g/dL   LDH    Collection Time: 01/31/19 12:17 PM   Result Value Ref Range    LDH Total 229 107 - 266 U/L   URIC ACID    Collection Time: 01/31/19 12:17 PM   Result Value Ref Range    Uric Acid 3.4 1.9 - 8.2 mg/dL   OP PRENATAL PANEL-BLOOD BANK    Collection Time: 01/31/19 12:17 PM   Result Value Ref Range    ABO Grouping Only O     Rh Grouping Only POS     Antibody Screen Scrn NEG    ESTIMATED GFR    Collection Time: 01/31/19 12:17 PM   Result Value Ref Range    GFR If African American >60 >60 mL/min/1.73 m 2    GFR If Non African American >60 >60 mL/min/1.73 m 2   PREG CNTR PRENATAL PN    Collection Time: 01/31/19  1:28 PM   Result Value Ref Range    Color Yellow     Character Clear     Specific Gravity 1.012 <1.035    Ph 7.0 5.0 - 8.0    Glucose Negative Negative mg/dL    Ketones Negative Negative mg/dL    Protein Negative Negative mg/dL    Bilirubin Negative Negative    Urobilinogen, Urine 0.2 Negative    Nitrite Negative Negative    Leukocyte Esterase Large (A) Negative    Occult Blood Negative Negative    Micro Urine Req Microscopic     WBC 13.6 (H) 4.8 - 10.8 K/uL    RBC 4.64 4.20 - 5.40 M/uL    Hemoglobin 13.5 12.0 - 16.0 g/dL    Hematocrit 40.9 37.0 - 47.0 %    MCV 88.1 81.4 - 97.8 fL    MCH 29.1 27.0 - 33.0 pg    MCHC 33.0 (L) 33.6 - 35.0 g/dL    RDW 42.8 35.9 - 50.0 fL    Platelet Count 300 164 - 446 K/uL    MPV 10.0 9.0 - 12.9 fL    Neutrophils-Polys 71.00 44.00 - 72.00 %    Lymphocytes 21.00 (L) 22.00 - 41.00 %    Monocytes 5.90  0.00 - 13.40 %    Eosinophils 1.00 0.00 - 6.90 %    Basophils 0.70 0.00 - 1.80 %    Immature Granulocytes 0.40 0.00 - 0.90 %    Nucleated RBC 0.00 /100 WBC    Neutrophils (Absolute) 9.64 (H) 2.00 - 7.15 K/uL    Lymphs (Absolute) 2.86 1.00 - 4.80 K/uL    Monos (Absolute) 0.80 0.00 - 0.85 K/uL    Eos (Absolute) 0.14 0.00 - 0.51 K/uL    Baso (Absolute) 0.10 0.00 - 0.12 K/uL    Immature Granulocytes (abs) 0.06 0.00 - 0.11 K/uL    NRBC (Absolute) 0.00 K/uL    Hepatitis B Surface Antigen Negative Negative    Rubella IgG Antibody 34.80 IU/mL    Syphilis, Treponemal Qual Non Reactive Non Reactive   HIV AG/AB COMBO ASSAY SCREENING    Collection Time: 01/31/19  1:28 PM   Result Value Ref Range    HIV Ag/Ab Combo Assay Non Reactive Non Reactive   URINE CULTURE(NEW)    Collection Time: 01/31/19  1:28 PM   Result Value Ref Range    Significant Indicator POS (POS)     Source UR     Site -     Urine Culture Mixed skin ap 10-50,000 cfu/mL (A)     Urine Culture (A)      Probable Gardnerella vaginalis  ,000 cfu/mL     URINE MICROSCOPIC (W/UA)    Collection Time: 01/31/19  1:28 PM   Result Value Ref Range    WBC 10-20 (A) /hpf    RBC 0-2 /hpf    Bacteria Moderate (A) None /hpf    Epithelial Cells Moderate (A) /hpf    Hyaline Cast 3-5 (A) /lpf   URINETOTAL PROTEIN 24 HR    Collection Time: 02/05/19 10:00 AM   Result Value Ref Range    Total Volume, Urine 1650 mL    Total Protein, Urine 5.0 0.0 - 15.0 mg/dL    Total Protein, 24 Hour Urine 82.5 30.0 - 150.0 mg/24 Hr   POCT Glucose    Collection Time: 02/28/19 11:03 AM   Result Value Ref Range    Glucose - Accu-Ck 114 (A) 70 - 100 mg/dL   POCT Glucose    Collection Time: 03/21/19  8:35 AM   Result Value Ref Range    Glucose - Accu-Ck 102 (A) 70 - 100 mg/dL   HEMOGLOBIN A1C    Collection Time: 04/15/19 11:27 AM   Result Value Ref Range    Glycohemoglobin 5.8 (H) 0.0 - 5.6 %    Est Avg Glucose 120 mg/dL   POCT Glucose    Collection Time: 04/18/19  8:19 AM   Result Value Ref Range     Glucose - Accu-Ck 126 (A) 70 - 100 mg/dL   HCT    Collection Time: 04/30/19  1:23 PM   Result Value Ref Range    Hematocrit 36.9 (L) 37.0 - 47.0 %   HGB    Collection Time: 04/30/19  1:23 PM   Result Value Ref Range    Hemoglobin 12.3 12.0 - 16.0 g/dL   T.PALLIDUM AB EIA    Collection Time: 04/30/19  1:23 PM   Result Value Ref Range    Syphilis, Treponemal Qual Non Reactive Non Reactive   POCT Glucose    Collection Time: 05/02/19  8:35 AM   Result Value Ref Range    Glucose - Accu-Ck 118 (A) 70 - 100 mg/dL   POCT Glucose    Collection Time: 05/16/19  8:29 AM   Result Value Ref Range    Glucose - Accu-Ck 124 (A) 70 - 100 mg/dL   POC UA    Collection Time: 05/20/19 11:55 PM   Result Value Ref Range    POC Color Yellow     POC Appearance Clear     POC Glucose Negative Negative mg/dL    POC Ketones Trace (A) Negative mg/dL    POC Specific Gravity 1.010 1.005 - 1.030    POC Blood Negative Negative    POC Urine PH 5.5 5.0 - 8.0    POC Protein Negative Negative mg/dL    POC Nitrites Negative Negative    POC Leukocyte Esterase Moderate (A) Negative   POCT Glucose    Collection Time: 05/30/19  8:37 AM   Result Value Ref Range    Glucose - Accu-Ck 136 (A) 70 - 100 mg/dL   POCT Glucose    Collection Time: 06/13/19  8:30 AM   Result Value Ref Range    Glucose - Accu-Ck 97 70 - 100 mg/dL   POCT Fetal Nonstress Test    Collection Time: 06/17/19  3:18 PM   Result Value Ref Range    NST Indications      NST Baseline      NST Uterine Activity      NST Acoustic Stimulation      NST Assessment      NST Action Necessary      NST Other Data      NST Return      NST Read By     POCT NST    Collection Time: 06/20/19  8:11 AM   Result Value Ref Range    NST Indications      NST Baseline      NST Uterine Activity      NST Acoustic Stimulation      NST Assessment      NST Action Necessary      NST Other Data      NST Return      NST Read By     POCT Glucose    Collection Time: 06/20/19  8:39 AM   Result Value Ref Range    Glucose -  Accu-Ck 116 (A) 70 - 100 mg/dL   POCT Fetal Nonstress Test    Collection Time: 06/24/19  3:42 PM   Result Value Ref Range    NST Indications HTN, GDMA1     NST Baseline 130bpm     NST Uterine Activity None     NST Acoustic Stimulation None     NST Assessment Cat 1/appropriate for GA     NST Action Necessary      NST Other Data BPs 120s/70s     NST Return 2x/wk until delivery     NST Read By JERI    POCT Glucose    Collection Time: 06/27/19  9:30 AM   Result Value Ref Range    Glucose - Accu-Ck 71 70 - 100 mg/dL   POCT Fetal Nonstress Test    Collection Time: 06/27/19  9:46 AM   Result Value Ref Range    NST Indications      NST Baseline      NST Uterine Activity      NST Acoustic Stimulation      NST Assessment      NST Action Necessary      NST Other Data      NST Return      NST Read By     POCT Fetal Nonstress Test    Collection Time: 07/01/19  2:35 PM   Result Value Ref Range    NST Indications GDM A2     NST Baseline 125     NST Uterine Activity quiet     NST Acoustic Stimulation n/a     NST Assessment reactive, category 1 but with 1 variable     NST Action Necessary US now for growth and REGINALD     NST Other Data cont 2x wkly NST     NST Return as sched     NST Read By ANTONIETA Kay CNM    POCT Fetal Nonstress Test    Collection Time: 07/03/19 11:18 AM   Result Value Ref Range    NST Indications      NST Baseline      NST Uterine Activity      NST Acoustic Stimulation      NST Assessment      NST Action Necessary      NST Other Data      NST Return      NST Read By     POCT Glucose    Collection Time: 07/03/19 11:19 AM   Result Value Ref Range    Glucose - Accu-Ck 180 (A) 70 - 100 mg/dL   Fetal Nonstress Test    Collection Time: 07/08/19  4:13 PM   Result Value Ref Range    NST Indications A2GDM     NST Baseline 120s     NST Uterine Activity none     NST Acoustic Stimulation      NST Assessment Cat 1     NST Action Necessary      NST Other Data      NST Return twice weekly     NST Read By Magy    POCT Fetal Nonstress  Test    Collection Time: 07/11/19  9:17 AM   Result Value Ref Range    NST Indications      NST Baseline      NST Uterine Activity      NST Acoustic Stimulation      NST Assessment      NST Action Necessary      NST Other Data      NST Return      NST Read By     POCT Glucose    Collection Time: 07/11/19  9:27 AM   Result Value Ref Range    Glucose - Accu-Ck 100 70 - 100 mg/dL   GRP B STREP, BY PCR (HILARIO BROTH)    Collection Time: 07/11/19 10:01 AM   Result Value Ref Range    Strep Gp B DNA PCR POSITIVE (A) Negative   POCT Fetal Nonstress Test    Collection Time: 07/15/19  3:43 PM   Result Value Ref Range    NST Indications      NST Baseline      NST Uterine Activity      NST Acoustic Stimulation      NST Assessment      NST Action Necessary      NST Other Data      NST Return      NST Read By     POCT Fetal Nonstress Test    Collection Time: 07/19/19 11:26 AM   Result Value Ref Range    NST Indications      NST Baseline      NST Uterine Activity      NST Acoustic Stimulation      NST Assessment      NST Action Necessary      NST Other Data      NST Return      NST Read By     POCT Glucose    Collection Time: 07/22/19  3:26 PM   Result Value Ref Range    Glucose - Accu-Ck 116 (A) 70 - 100 mg/dL   POCT Fetal Nonstress Test    Collection Time: 07/22/19  3:27 PM   Result Value Ref Range    NST Indications      NST Baseline      NST Uterine Activity      NST Acoustic Stimulation      NST Assessment      NST Action Necessary      NST Other Data      NST Return      NST Read By     POCT NST    Collection Time: 07/25/19  9:59 AM   Result Value Ref Range    NST Indications      NST Baseline      NST Uterine Activity      NST Acoustic Stimulation      NST Assessment      NST Action Necessary      NST Other Data      NST Return      NST Read By     POCT Fetal Nonstress Test    Collection Time: 07/29/19  3:44 PM   Result Value Ref Range    NST Indications GDM A2     NST Baseline 120s     NST Uterine Activity no     NST Acoustic  Stimulation no     NST Assessment Reactive NST     NST Action Necessary      NST Other Data      NST Return      NST Read By     POCT Glucose    Collection Time: 19 11:00 AM   Result Value Ref Range    Glucose - Accu-Ck 104 (A) 70 - 100 mg/dL   ACCU-CHEK GLUCOSE    Collection Time: 19  7:44 AM   Result Value Ref Range    Glucose - Accu-Ck 71 65 - 99 mg/dL   CBC WITH DIFFERENTIAL    Collection Time: 19  7:50 AM   Result Value Ref Range    WBC 11.6 (H) 4.8 - 10.8 K/uL    RBC 4.51 4.20 - 5.40 M/uL    Hemoglobin 13.2 12.0 - 16.0 g/dL    Hematocrit 39.4 37.0 - 47.0 %    MCV 87.4 81.4 - 97.8 fL    MCH 29.3 27.0 - 33.0 pg    MCHC 33.5 (L) 33.6 - 35.0 g/dL    RDW 45.3 35.9 - 50.0 fL    Platelet Count 254 164 - 446 K/uL    MPV 10.7 9.0 - 12.9 fL    Neutrophils-Polys 61.60 44.00 - 72.00 %    Lymphocytes 29.20 22.00 - 41.00 %    Monocytes 6.50 0.00 - 13.40 %    Eosinophils 1.70 0.00 - 6.90 %    Basophils 0.50 0.00 - 1.80 %    Immature Granulocytes 0.50 0.00 - 0.90 %    Nucleated RBC 0.00 /100 WBC    Neutrophils (Absolute) 7.13 2.00 - 7.15 K/uL    Lymphs (Absolute) 3.39 1.00 - 4.80 K/uL    Monos (Absolute) 0.75 0.00 - 0.85 K/uL    Eos (Absolute) 0.20 0.00 - 0.51 K/uL    Baso (Absolute) 0.06 0.00 - 0.12 K/uL    Immature Granulocytes (abs) 0.06 0.00 - 0.11 K/uL    NRBC (Absolute) 0.00 K/uL        Assessment:   Angelina Jarquin at 39w1d  Labor status: Not in labor.  Obstetrical history significant for   Patient Active Problem List    Diagnosis Date Noted   • GBS (group B Streptococcus carrier), +RV culture, currently pregnant 07/15/2019   • GDM, class A2 2019   • Hx of preeclampsia, prior pregnancy, currently pregnant, third trimester 2019   • High-risk pregnancy supervision, third trimester 2019   • Modified White class C pregestational diabetes mellitus 2019   • History of  delivery-documented LTCS 12/15/2017   .      Plan:     Admit to L&D for repeat  section  GBS  positive  Fetal monitoring/toco  IVF and NPO since midnight  Anesthesiologist aware  Postpartum birth control method: Mirena IUD

## 2019-08-06 NOTE — OP REPORT
DATE OF SERVICE:  2019     PREOPERATIVE DIAGNOSES:  1.  Intrauterine pregnancy at 39w1d  2.  Mild peritoneal adhesions     POSTOPERATIVE DIAGNOSES:  1.  Intrauterine pregnancy at 39w1d  2.  same    PROCEDURE PERFORMED:  repeat low transverse  section.     SURGEON: Lexis Linton DO     ASSISTANT: Ashleigh Arenas CNM; for optimal visualization and retraction.     ANESTHESIA:  Spinal.     ANESTHESIOLOGIST:  Destiny Salgado MD     SPECIMEN:  none     ESTIMATED BLOOD LOSS:  500c mL     FINDINGS:  A viable female infant, weight 2695gr (5lb15.1oz), Apgars of 8 and 9 in vertex    presentation with clear amniotic fluid.  There was a normal uterus,   tubes, and ovaries bilaterally. Intercede utilized for adhesions prevention.     COMPLICATIONS:  None.     PROCEDURE:  After appropriate consents were obtained, the patient was taken to the operating room where spinal  anesthesia was applied without complications.  The patient was then prepped and draped in the usual sterile manner.  Clamp test on the skin verified adequate anesthesia.  A Pfannenstiel incision was made with a scalpel 3cm superior to the pubic symphysis and extended down to the rectus fascia.  The rectus fascia was incised with the scalpel and tented up. The underlying rectus muscle was  from the fascia first inferiorly and then superiorly using the cohen scissors.  The rectus muscle was  bluntly in the midline.  The peritoneum was entered bluntly in the midline. The peritoneum incision was extended superiorly and inferiorly with the Metzenbaum scissors with great care to avoid injury to underlying bowel or bladder.  Chandra retractor was placed. The vesicouterine peritoneum was tented up and entered with Metzenbaum scissors, and a bladder flap was created.  An incision was made into the lower uterine segment transversely and the incision was extended bluntly.  Amniotomy was performed and there was noted to be clear amniotic fluid. The  Infant's head was grasped and delivered atraumatically followed by the remainder of the body without any complications.  The mouth and nares were suctioned. The cord was doubly clamped and cut and the infant was handed off to awaiting neonatology team.  The placenta was then allowed to spontaneously deliver. The uterus was cleared of clots and debris.  The hysterotomy incision was reapproximated with 0 PDS suture in a running locked fashion. Hemostasis was noted.  The tubes and ovaries were examined and noted to be normal. The pericolic gutters were examined and any blood clots were removed.  The Chandra retractor was removed.  The rectus muscles were examined and hemostatic. They were re approximated with 3-0 Monocryl suture in an interrupted fashion. The fascia was reapproximated with 0 Vicryl suture running.  The subcutaneous fat was irrigated and any small bleeders were bovied for hemostasis.  The subcutaneous fat was then reapproximated with 3-0 Vicryl suture running.  The skin was reapproximated with 4-0 Monocryl suture in a running fashion. Steri strips and a dressing were placed.  Sponge, needle, instrument, and lap counts were correct x2.  Patient tolerated the procedure well and went to recovery room in stable condition.        ____________________________________     Lexis Linton DO

## 2019-08-06 NOTE — PROGRESS NOTES
1600- Fundus firm.  Lochia light.  Pads changed.  Indwelling catheter care done.  Patient c/o nausea and had small emesis of approximately 25 mls.  Patient declined offer for anti-emetic at this time.

## 2019-08-06 NOTE — ANESTHESIA TIME REPORT
Anesthesia Start and Stop Event Times     Date Time Event    2019 0923 Ready for Procedure     0931 Anesthesia Start     1039 Anesthesia Stop        Responsible Staff  19    Name Role Begin End    Destiny Salgado M.D. Anesth 0931 1039        Preop Diagnosis (Free Text):  Pre-op Diagnosis     PREVIOUS  DELIVERY, PERMANENT STERILIZATION, 39+1 WEEKS        Preop Diagnosis (Codes):    Post op Diagnosis  Pregnancy      Premium Reason  Non-Premium    Comments:

## 2019-08-06 NOTE — CARE PLAN
Problem: Communication  Goal: The ability to communicate needs accurately and effectively will improve  Outcome: PROGRESSING AS EXPECTED  Note:   Reviewed plan of care with patient who verbalized understanding.      Problem: Venous Thromboembolism (VTW)/Deep Vein Thrombosis (DVT) Prevention:  Goal: Patient will participate in Venous Thrombosis (VTE)/Deep Vein Thrombosis (DVT)Prevention Measures  Outcome: PROGRESSING AS EXPECTED  Flowsheets  Taken 2019 1235  Mechanical Prophylaxis : SCDs, Sequential Compression Device  Taken 2019 1453  SCDs, Sequential Compression Device: On     Problem: Potential for postpartum infection related to surgical incision, compromised uterine condition, urinary tract or respiratory compromise  Goal: Patient will be afebrile and free from signs and symptoms of infection  Outcome: PROGRESSING AS EXPECTED  Note:   Patient is afebrile.      Problem: Altered physiologic condition related to postoperative  delivery  Goal: Patient physiologically stable as evidenced by normal lochia, palpable uterine involution and vital signs within normal limits  Outcome: PROGRESSING SLOWER THAN EXPECTED  Note:   Fundus firm.  Lochia scant to light.  Blood pressure elevated.  REYMUNDO Jane notifed.  Orders received.

## 2019-08-06 NOTE — PROGRESS NOTES
1235- Patient arrived to Room S341.  Report received from SCOTT Osei RN.  Patient assessment done.  IV patent, infusing LR with 20 units pitocin at 125 ml/hr.  Abdominal dressing is clean, dry, and intact.  Sequential stockings on.  Indwelling catheter to gravity with clear urine.  Discussed pain management with patient.  Patient informed of MD order for tylenol and toradol.  Patient prefers to call for further pain intervention as needed.  Patient denied headache, blurred vision, epigastric pain, and nausea.  Patient oriented to room, call system, and infant security.  Reviewed plan of care.  Patient verbalized understanding.  FOB at bedside.  1430- Patient denied headache, blurred vision, epigastric pain, and nausea.  Patient stated she takes Metformin 500 mg, PO, twice daily.  1442- REYMUNDO Jane, notified of patient's blood pressures since transfer to postpartum.  Telephone order received to notify MD for systolic blood pressures greater than 160 or diastolic blood pressures greater than 110.  Telephone order received to discontinue methergine and to order Cytotec 800 mcg, [place], once prn, for excessive uterine bleeding.  Telephone order received for Metformin 500 mg, PO, BID.

## 2019-08-07 ENCOUNTER — TELEPHONE (OUTPATIENT)
Dept: OBGYN | Facility: CLINIC | Age: 29
End: 2019-08-07

## 2019-08-07 PROCEDURE — 700102 HCHG RX REV CODE 250 W/ 637 OVERRIDE(OP): Performed by: NURSE PRACTITIONER

## 2019-08-07 PROCEDURE — 700102 HCHG RX REV CODE 250 W/ 637 OVERRIDE(OP): Performed by: ANESTHESIOLOGY

## 2019-08-07 PROCEDURE — 770002 HCHG ROOM/CARE - OB PRIVATE (112)

## 2019-08-07 PROCEDURE — 700111 HCHG RX REV CODE 636 W/ 250 OVERRIDE (IP): Performed by: ANESTHESIOLOGY

## 2019-08-07 PROCEDURE — A9270 NON-COVERED ITEM OR SERVICE: HCPCS | Performed by: NURSE PRACTITIONER

## 2019-08-07 PROCEDURE — A9270 NON-COVERED ITEM OR SERVICE: HCPCS | Performed by: ANESTHESIOLOGY

## 2019-08-07 RX ORDER — ONDANSETRON 4 MG/1
4 TABLET, ORALLY DISINTEGRATING ORAL EVERY 6 HOURS PRN
Status: DISCONTINUED | OUTPATIENT
Start: 2019-08-07 | End: 2019-08-09 | Stop reason: HOSPADM

## 2019-08-07 RX ORDER — IBUPROFEN 800 MG/1
800 TABLET ORAL EVERY 8 HOURS PRN
Status: DISCONTINUED | OUTPATIENT
Start: 2019-08-07 | End: 2019-08-09 | Stop reason: HOSPADM

## 2019-08-07 RX ORDER — ONDANSETRON 2 MG/ML
4 INJECTION INTRAMUSCULAR; INTRAVENOUS EVERY 6 HOURS PRN
Status: DISCONTINUED | OUTPATIENT
Start: 2019-08-07 | End: 2019-08-09 | Stop reason: HOSPADM

## 2019-08-07 RX ORDER — OXYCODONE HYDROCHLORIDE AND ACETAMINOPHEN 5; 325 MG/1; MG/1
2 TABLET ORAL EVERY 4 HOURS PRN
Status: DISCONTINUED | OUTPATIENT
Start: 2019-08-07 | End: 2019-08-09 | Stop reason: HOSPADM

## 2019-08-07 RX ORDER — OXYCODONE HYDROCHLORIDE AND ACETAMINOPHEN 5; 325 MG/1; MG/1
1 TABLET ORAL EVERY 4 HOURS PRN
Status: DISCONTINUED | OUTPATIENT
Start: 2019-08-07 | End: 2019-08-09 | Stop reason: HOSPADM

## 2019-08-07 RX ADMIN — ACETAMINOPHEN 1000 MG: 500 TABLET ORAL at 02:14

## 2019-08-07 RX ADMIN — METFORMIN HYDROCHLORIDE 500 MG: 500 TABLET, FILM COATED ORAL at 08:13

## 2019-08-07 RX ADMIN — METFORMIN HYDROCHLORIDE 500 MG: 500 TABLET, FILM COATED ORAL at 17:31

## 2019-08-07 RX ADMIN — KETOROLAC TROMETHAMINE 30 MG: 30 INJECTION, SOLUTION INTRAMUSCULAR at 10:33

## 2019-08-07 RX ADMIN — ACETAMINOPHEN 1000 MG: 500 TABLET ORAL at 08:13

## 2019-08-07 RX ADMIN — KETOROLAC TROMETHAMINE 30 MG: 30 INJECTION, SOLUTION INTRAMUSCULAR at 04:08

## 2019-08-07 ASSESSMENT — EDINBURGH POSTNATAL DEPRESSION SCALE (EPDS)
I HAVE BLAMED MYSELF UNNECESSARILY WHEN THINGS WENT WRONG: NOT VERY OFTEN
I HAVE LOOKED FORWARD WITH ENJOYMENT TO THINGS: AS MUCH AS I EVER DID
THINGS HAVE BEEN GETTING ON TOP OF ME: NO, MOST OF THE TIME I HAVE COPED QUITE WELL
I HAVE FELT SCARED OR PANICKY FOR NO GOOD REASON: NO, NOT AT ALL
THE THOUGHT OF HARMING MYSELF HAS OCCURRED TO ME: NEVER
I HAVE BEEN SO UNHAPPY THAT I HAVE BEEN CRYING: NO, NEVER
I HAVE BEEN SO UNHAPPY THAT I HAVE HAD DIFFICULTY SLEEPING: NOT AT ALL
I HAVE BEEN ANXIOUS OR WORRIED FOR NO GOOD REASON: HARDLY EVER
I HAVE BEEN ABLE TO LAUGH AND SEE THE FUNNY SIDE OF THINGS: AS MUCH AS I ALWAYS COULD
I HAVE FELT SAD OR MISERABLE: NO, NOT AT ALL

## 2019-08-07 NOTE — LACTATION NOTE
This note was copied from a baby's chart.  Met with Mom/baby, born at 1007 today via  at 39.1 wks gestation, now about 6 hrs old. Mom with diabetes controlled by with Metformin 500 mg twice a day. Baby's glucose at 1325 was 78.     Helped mother latch baby in cross-cradle for a few minutes and then football hold for about 10 min. Baby opens mouth widely and goes onto breast, latches well with help. At this time mother needs help because of limited mobility. Will need follow up.

## 2019-08-07 NOTE — PROGRESS NOTES
Assumed care of patient. Assessment complete. Fundus is firm, at the umbilicus, with light lochia rubra. Pain level is low, at a 1/10,patient medicated per MAR. After assessment, assisted patient out of bed and to the bathroom. Sat on edge of bed, patient needed assistance of one to restroom. Tolerated well, steady gait. Provided perineal care and given new gown. Bed is locked and in low position. Call light left within reach and encouraged to call for any needs if necessary.

## 2019-08-07 NOTE — PROGRESS NOTES
0700 Received bedside report from JUANITA Alberto. Discussed plan of care. Patient will call for pain medication as needed. All needs met at this time.

## 2019-08-07 NOTE — CARE PLAN
Problem: Potential for postpartum infection related to surgical incision, compromised uterine condition, urinary tract or respiratory compromise  Goal: Patient will be afebrile and free from signs and symptoms of infection  Outcome: PROGRESSING AS EXPECTED  Note:   Vital signs stable and within parameters. Pt afebrile.      Problem: Alteration in comfort related to surgical incision and/or after birth pains  Goal: Patient is able to ambulate, care for self and infant with acceptable pain level  Outcome: PROGRESSING AS EXPECTED  Note:   Assisted patient up to bathroom. Gait steady, tolerated well.

## 2019-08-07 NOTE — PROGRESS NOTES
"Received bedside report  from \"JUANITA Colon\"  on patient and assumed care.  Pt denies any pain or any questions or needs at this time.  "

## 2019-08-07 NOTE — ANESTHESIA POSTPROCEDURE EVALUATION
Patient: Angelina Jarquin    Procedure Summary     Date:  19 Room / Location:  [facility] OR  / LABOR AND DELIVERY    Anesthesia Start:  931 Anesthesia Stop:      Procedure:   SECTION, REPEAT, WITH SALPINGECTOMY (Bilateral Abdomen) Diagnosis:  (Same Del)    Surgeon:  Lexis Linton D.O. Responsible Provider:  Destiny Salgado M.D.    Anesthesia Type:  spinal ASA Status:  2          Final Anesthesia Type: spinal  Last vitals  BP   Blood Pressure: 136/78    Temp   36.4 °C (97.6 °F)    Pulse   Pulse: 95   Resp   18    SpO2   96 %      Anesthesia Post Evaluation    Patient location during evaluation: PACU  Patient participation: complete - patient participated  Level of consciousness: awake and alert  Pain score: 0    Airway patency: patent  Anesthetic complications: no  Cardiovascular status: adequate and hemodynamically stable  Respiratory status: acceptable  Hydration status: euvolemic    PONV: none           Nurse Pain Score: 1 (NPRS)

## 2019-08-07 NOTE — PROGRESS NOTES
Post Partum Progress Note    Name:   Angelina Jarquin   Date/Time:  2019 - 6:56 AM  Chief Admitting Dx:  PREVIOUS  DELIVERY, PERMANENT STERILIZATION, 39+1 WEEKS  History of  delivery  Delivery Type:   for repeat  Post-Op/Post Partum Days #:  1    Subjective:  Abdominal pain: no  Ambulating:   yes  Tolerating liquids:  yes  Tolerating food:  yes common adult  Flatus:   yes  BM:    no  Bleeding:   With small amount of bleeding  Voiding:   yes  Dizziness:   no  Feeding:   breast    Vitals:    19 0000 19 0100 19 0200 19 0600   BP:   118/73 131/83   Pulse: 88 73 79 73   Resp: 18 17 17 17   Temp:   36.9 °C (98.4 °F) 36.6 °C (97.8 °F)   TempSrc:   Temporal Temporal   SpO2: 94% 93% 93% 94%   Weight:       Height:           Exam:  Breast: Tenderness no and Engorged no  Abdomen: Abdomen soft, non-tender. BS normal. No masses,  No organomegaly  Fundal Tenderness:  no  Fundus Firm: yes  Incision: no evidence of infection, separation or keloid formation.  Below umbilicus: yes  Perineum: perineum intact  Lochia: mild  Extremities: trace extremities, peripheral pulses and reflexes normal    Meds:  Current Facility-Administered Medications   Medication Dose   • lactated ringers (LR) infusion     • acetaminophen (TYLENOL) tablet 1,000 mg  1,000 mg   • ketorolac (TORADOL) injection 30 mg  30 mg   • oxyCODONE immediate-release (ROXICODONE) tablet 5 mg  5 mg   • oxyCODONE immediate release (ROXICODONE) tablet 10 mg  10 mg   • HYDROmorphone pf (DILAUDID) injection 0.2 mg  0.2 mg   • HYDROmorphone pf (DILAUDID) injection 0.4 mg  0.4 mg   • ondansetron (ZOFRAN) syringe/vial injection 4 mg  4 mg   • diphenhydrAMINE (BENADRYL) injection 12.5 mg  12.5 mg   • naloxone (NARCAN) 0.4 mg in NS 1,000 mL infusion  0.4 mg   • diphenhydrAMINE (BENADRYL) injection 12.5 mg  12.5 mg    Or   • diphenhydrAMINE (BENADRYL) injection 25 mg  25 mg    Or   • naloxone (NARCAN) 0.4 mg in NS 1,000 mL  infusion  0.4 mg   • LR infusion     • PRN oxytocin (PITOCIN) (20 Units/1000 mL) PRN for excessive uterine bleeding - See Admin Instr  125-999 mL/hr   • docusate sodium (COLACE) capsule 100 mg  100 mg   • miSOPROStol (CYTOTEC) tablet 800 mcg  800 mcg   • metFORMIN (GLUCOPHAGE) tablet 500 mg  500 mg       Labs:   Recent Labs     19  0750 19  1854   WBC 11.6* 16.1*   RBC 4.51 4.23   HEMOGLOBIN 13.2 12.3   HEMATOCRIT 39.4 37.0   MCV 87.4 87.5   MCH 29.3 29.1   MCHC 33.5* 33.2*   RDW 45.3 44.7   PLATELETCT 254 244   MPV 10.7 10.5       Assessment:  Chief Admitting Dx:  PREVIOUS  DELIVERY, PERMANENT STERILIZATION, 39+1 WEEKS  History of  delivery  Delivery Type:   for repeat  Tubal Ligation:  no    Plan:  Continue routine post partum care. Advance care, encourage ambulation, pain control, anticipate d/c home in 2 days, POD#3.     ELIF Castillo.

## 2019-08-07 NOTE — TELEPHONE ENCOUNTER
Pt called wanting to know if we received Physician Statement from [facility], notified pt it is not in her chart, provided fax # to have it faxed to us. Pt will call them and have them fax to us    8/9/19 Pt called to see if we had received form from [facility]. Notified pt we have it and we will fax it back

## 2019-08-08 PROCEDURE — A9270 NON-COVERED ITEM OR SERVICE: HCPCS | Performed by: OBSTETRICS & GYNECOLOGY

## 2019-08-08 PROCEDURE — 770002 HCHG ROOM/CARE - OB PRIVATE (112)

## 2019-08-08 PROCEDURE — 700102 HCHG RX REV CODE 250 W/ 637 OVERRIDE(OP): Performed by: OBSTETRICS & GYNECOLOGY

## 2019-08-08 PROCEDURE — 700102 HCHG RX REV CODE 250 W/ 637 OVERRIDE(OP): Performed by: NURSE PRACTITIONER

## 2019-08-08 PROCEDURE — A9270 NON-COVERED ITEM OR SERVICE: HCPCS | Performed by: NURSE PRACTITIONER

## 2019-08-08 RX ADMIN — IBUPROFEN 800 MG: 800 TABLET, FILM COATED ORAL at 07:59

## 2019-08-08 RX ADMIN — IBUPROFEN 800 MG: 800 TABLET, FILM COATED ORAL at 17:37

## 2019-08-08 RX ADMIN — METFORMIN HYDROCHLORIDE 500 MG: 500 TABLET, FILM COATED ORAL at 17:35

## 2019-08-08 RX ADMIN — METFORMIN HYDROCHLORIDE 500 MG: 500 TABLET, FILM COATED ORAL at 07:59

## 2019-08-08 ASSESSMENT — PATIENT HEALTH QUESTIONNAIRE - PHQ9
2. FEELING DOWN, DEPRESSED, IRRITABLE, OR HOPELESS: NOT AT ALL
SUM OF ALL RESPONSES TO PHQ9 QUESTIONS 1 AND 2: 0
1. LITTLE INTEREST OR PLEASURE IN DOING THINGS: NOT AT ALL

## 2019-08-08 NOTE — PROGRESS NOTES
Assumed care of pt.  Denies c/o pain at this time.  Call light in place, encouraged to call with needs

## 2019-08-08 NOTE — PROGRESS NOTES
"Received bedside report  from \"JUANITA Ash\"  on patient and assumed care.  Pt denies any pain or any questions or needs at this time.  "

## 2019-08-08 NOTE — PROGRESS NOTES
1900- Report received from JUANITA Madden    Assessment completed, VSS. POC, feeding frequency, safe sleep, and pain management, all questions answered. Pt requests pain meds as needed. Pt requesting DBM due to sore nipples, pt educated on need for DBM, and at this time infant does not meet criteria at this time, encouraged to call for next feed. Education reviewed all questions answered.   Infant began to attempt to show signs of hunger, attempted to latch infant, MOB expressed pain with every latch. SHEILA Vuong on floor and asked to see patient.

## 2019-08-08 NOTE — PROGRESS NOTES
Assessment done.  Care plan reviewed and pt progressing according to caremap.  FOB at bedside and very supportive. Reviewed infant security measures with pt per hospital protocol.  Pt advised of emergency cords in her room .  Pt wearing supportive bra and encouraged to continue.  Pt using peribottle to perineum. Ambulating well by self. Please see MAR for pain med administration.  Pt advised to call for any needs.

## 2019-08-08 NOTE — CARE PLAN
Problem: Potential for postpartum infection related to surgical incision, compromised uterine condition, urinary tract or respiratory compromise  Goal: Patient will be afebrile and free from signs and symptoms of infection  Outcome: PROGRESSING AS EXPECTED  Note:   Patient has no signs/symptoms of infection.  Vital signs stable.  Ambulating without difficulty.      Problem: Alteration in comfort related to surgical incision and/or after birth pains  Goal: Patient verbalizes acceptable pain level  Outcome: PROGRESSING AS EXPECTED  Note:   Patient states pain control is adequate.

## 2019-08-08 NOTE — LACTATION NOTE
This note was copied from a baby's chart.  STEPHAN is a multip who did not breastfeed her first who was in NICU. This infant is latching shallow causing breakdown of the nipples. Nipple care taught. Infant latched deep in the football hold without difficulty. MOB reports sore. Teach signs of a good latch. Encouraged to call for assistance when infant is showing cues. Lactation education provided as above.

## 2019-08-08 NOTE — CARE PLAN
Problem: Bowel/Gastric:  Goal: Normal bowel function is maintained or improved  Outcome: PROGRESSING AS EXPECTED  Note:   Pt confirms she has been having bowel movements     Problem: Altered physiologic condition related to postoperative  delivery  Goal: Patient physiologically stable as evidenced by normal lochia, palpable uterine involution and vital signs within normal limits  Outcome: PROGRESSING AS EXPECTED  Note:   Pt is firm and light

## 2019-08-08 NOTE — LACTATION NOTE
"Primary RN, Nilsa John, reported MOB is experiencing pain with breastfeeding and is requesting DBM for infant in place of putting infant to the breast.  Met with MOB and offered lactation assistance, but MOB declined.  MOB stated infant just fed at 2000 and is sleeping.  MOB stated has tenderness at breasts bilaterally and stated, \"I will just go through the pain tonight\" until she can be provided with lactation assistance in the morning as this LC will be leaving shortly.  Breast assessment performed and MOB observed to have bruising at nipples bilaterally.  However, skin remains intact and MOB stated redness at nipples has gone away with the use of Lanolin cream.  MOB was provided with latch assistance earlier today by Lactation and she stated that infant is latching deeper onto the breast.  Infant's weight loss to date remains within defined limits at 8% and infant has voided and stooled multiple times since birth.    MOB provided with 24 mm nipple shield along with verbal and written instructions on use and cleaning of nipple shield.  MOB informed of the potential risks to infant and milk supply with use of nipple shield.  MOB will use nipple shield tonight to help ease discomfort at the breasts with latch.  MOB aware to look for colostrum in nipple shield to assess for transfer of breast milk.  MOB also provided with hospital grade double electric breast pump to use following breastfeeds with nipple shield to protect milk supply.  Verbal and written instructions on pump use and cleaning of pump parts provided at this time as well.    Breastfeeding Plan:  1.  Place nipple shield on breast as instructed at the start of each breastfeed.  MOB instructed to call RN so that latch can be observed with nipple shield in place.  2.  Pump immediately afterwards and feed back expressed breast milk to infant.    If there is an interruption in the voiding and stooling pattern of infant, if infant is showing signs of hunger " or if no colostrum is visualized in nipple shield following breastfeeds, provide MOB with formula to use in addition to expressed breast milk to meet supplemental volume requirements per the 10-20-30 supplementation guidelines.    Primary RN, Nilsa John, updated on plan of care.    MOB verbalized understanding of all information provided to her and denied having any further questions at this time.  Encouraged MOB to call for lactation assistance as needed.

## 2019-08-08 NOTE — PROGRESS NOTES
Obstetrics & Gynecology Post-Delivery Progress Note    Date of Service  2019    29 y.o.  2 s/p , Low Transverse   Delivery date: 2019  Breastfeeding: Yes    Events  No events    Subjective  Pain: No  Bleeding: lochia minimal  PO's: taking regular diet  Voiding: without difficulty  Ambulating: yes, without dizziness or weakness  Feeding: breastfeeding well, but topped up with formula because baby fussy this morning    Objective  Temp:  [36.1 °C (97 °F)-36.9 °C (98.4 °F)] 36.3 °C (97.4 °F)  Pulse:  [72-81] 81  Resp:  [16-18] 16  BP: (130-140)/(79-93) 130/79  SpO2:  [94 %-96 %] 94 %    Physical Exam  General: well and resting  Chest/Breasts: nipples intact and breasts soft  Fundus: firm, below umbilicus and nontender  Incision: healing well and steristrips in place  Perineum: deferred  Extremities: no edema    Lab Results   Component Value Date    RBC 4.23 2019    ABOGROUP O 2019    RH POS 2019     Immunization History   Administered Date(s) Administered   • Influenza Vaccine Quad Inj (Pf) 10/26/2017, 2019   • Tdap Vaccine 2017, 2019   • Tuberculin Skin Test 2016       Assessment/Plan  Angelina Jarquin is a 29 y.o.  postop day 2 s/p , Low Transverse .    1. Post care: meeting all goals, will continue to observe BP's as several elevated >140/90  2. Hemodynamics: stable  3. Pain: controlled  4. PNL:   Lab Results   Component Value Date    RH POS 2019    RUBELLAIGG 34.80 2019     5. Method of Feeding: plans to breastfeed  6. Method of Contraception: NA  7. Vaccinations:   Immunization History   Administered Date(s) Administered   • Influenza Vaccine Quad Inj (Pf) 10/26/2017, 2019   • Tdap Vaccine 2017, 2019   • Tuberculin Skin Test 2016     8. Disposition: likely home postop day 3   9. Continue metformin     No new Assessment & Plan notes have been filed under this hospital service since the last note  was generated.  Service: Obstetrics & Gynecology       VTE prophylaxis: pt is ambulatory    ZULY Carlos

## 2019-08-09 VITALS
OXYGEN SATURATION: 95 % | RESPIRATION RATE: 17 BRPM | DIASTOLIC BLOOD PRESSURE: 89 MMHG | WEIGHT: 181 LBS | SYSTOLIC BLOOD PRESSURE: 130 MMHG | BODY MASS INDEX: 35.53 KG/M2 | HEIGHT: 60 IN | HEART RATE: 87 BPM | TEMPERATURE: 97.7 F

## 2019-08-09 PROBLEM — O99.820 GBS (GROUP B STREPTOCOCCUS CARRIER), +RV CULTURE, CURRENTLY PREGNANT: Status: RESOLVED | Noted: 2019-07-15 | Resolved: 2019-08-09

## 2019-08-09 PROBLEM — O24.419 GDM, CLASS A2: Status: RESOLVED | Noted: 2019-06-13 | Resolved: 2019-08-09

## 2019-08-09 PROBLEM — O09.93 HIGH-RISK PREGNANCY SUPERVISION, THIRD TRIMESTER: Status: RESOLVED | Noted: 2019-03-21 | Resolved: 2019-08-09

## 2019-08-09 PROBLEM — Z98.891 HISTORY OF CESAREAN DELIVERY: Status: RESOLVED | Noted: 2017-12-15 | Resolved: 2019-08-09

## 2019-08-09 PROBLEM — O09.293 HX OF PREECLAMPSIA, PRIOR PREGNANCY, CURRENTLY PREGNANT, THIRD TRIMESTER: Status: RESOLVED | Noted: 2019-04-18 | Resolved: 2019-08-09

## 2019-08-09 PROCEDURE — A9270 NON-COVERED ITEM OR SERVICE: HCPCS | Performed by: NURSE PRACTITIONER

## 2019-08-09 PROCEDURE — A9270 NON-COVERED ITEM OR SERVICE: HCPCS | Performed by: OBSTETRICS & GYNECOLOGY

## 2019-08-09 PROCEDURE — 700102 HCHG RX REV CODE 250 W/ 637 OVERRIDE(OP): Performed by: NURSE PRACTITIONER

## 2019-08-09 PROCEDURE — 700112 HCHG RX REV CODE 229: Performed by: OBSTETRICS & GYNECOLOGY

## 2019-08-09 PROCEDURE — 700102 HCHG RX REV CODE 250 W/ 637 OVERRIDE(OP): Performed by: OBSTETRICS & GYNECOLOGY

## 2019-08-09 RX ORDER — IBUPROFEN 800 MG/1
800 TABLET ORAL EVERY 8 HOURS PRN
Qty: 30 TAB | Refills: 2 | Status: SHIPPED | OUTPATIENT
Start: 2019-08-09 | End: 2020-06-01

## 2019-08-09 RX ORDER — OXYCODONE HYDROCHLORIDE AND ACETAMINOPHEN 5; 325 MG/1; MG/1
1 TABLET ORAL EVERY 4 HOURS PRN
Qty: 30 TAB | Refills: 0 | Status: SHIPPED | OUTPATIENT
Start: 2019-08-09 | End: 2019-08-14

## 2019-08-09 RX ORDER — PSEUDOEPHEDRINE HCL 30 MG
100 TABLET ORAL 2 TIMES DAILY PRN
Qty: 60 CAP | Refills: 2 | Status: SHIPPED | OUTPATIENT
Start: 2019-08-09 | End: 2019-08-14

## 2019-08-09 RX ADMIN — DOCUSATE SODIUM 100 MG: 100 CAPSULE, LIQUID FILLED ORAL at 02:21

## 2019-08-09 RX ADMIN — IBUPROFEN 800 MG: 800 TABLET, FILM COATED ORAL at 02:21

## 2019-08-09 RX ADMIN — METFORMIN HYDROCHLORIDE 500 MG: 500 TABLET, FILM COATED ORAL at 09:07

## 2019-08-09 ASSESSMENT — PATIENT HEALTH QUESTIONNAIRE - PHQ9
1. LITTLE INTEREST OR PLEASURE IN DOING THINGS: NOT AT ALL
2. FEELING DOWN, DEPRESSED, IRRITABLE, OR HOPELESS: NOT AT ALL
SUM OF ALL RESPONSES TO PHQ9 QUESTIONS 1 AND 2: 0

## 2019-08-09 NOTE — LACTATION NOTE
This note was copied from a baby's chart.  Called to room to assist with latch. Attempt to latch without shield and infant was only getting the tip with a very shallow latch; Infant latched with 24mm shield achieving a good latch with a nurtitive feed; audible gulping and greater comfort for MOB was observed. MOB reports living in [place]. Called and set up an appointment on Tuesday @3:30pm for a 1:1 consultation @[facility] through the lactation services. Encourage to call for support while inpatient for assistance with latch.

## 2019-08-09 NOTE — CARE PLAN
Problem: Safety  Goal: Will remain free from injury  Outcome: PROGRESSING AS EXPECTED  Note:   Safety precautions in place, bed rails up x2, bed locked and lowest position, belongings and call light within reach. Rounding in place.      Problem: Alteration in comfort related to surgical incision and/or after birth pains  Goal: Patient is able to ambulate, care for self and infant with acceptable pain level  Outcome: PROGRESSING AS EXPECTED  Note:   Discussed pain management and verbalization of pain utilizing the 0-10 pain scale. White board updated with times of pain medication availability and pt requests pain medication be provided as available. Discussed non-pharmacological pain control therapies including splinting with a pillow and light abdominal stretching. Pt ambulates with a steady gait and demonstrates the ability to participate in ADLs and provide care for  daughter.

## 2019-08-09 NOTE — DISCHARGE SUMMARY
Discharge Summary:      Angelina Jarquin    Admit Date:   2019  Discharge Date:  2019     Admitting diagnosis:  PREVIOUS  DELIVERY, PERMANENT STERILIZATION, 39+1 WEEKS  History of  delivery  Discharge Diagnosis: Status post  for repeat.  Pregnancy Complications: gestational diabetes  Tubal Ligation:  no        History:  Past Medical History:   Diagnosis Date   • Diabetes (HCC)    • Hypertension      OB History    Para Term  AB Living   2 2 1 1   2   SAB TAB Ectopic Molar Multiple Live Births           0 2      # Outcome Date GA Lbr Augustus/2nd Weight Sex Delivery Anes PTL Lv   2 Term 19 39w1d  2.695 kg (5 lb 15.1 oz) F CS-LTranv  N LOLI   1  17 32w2d  1.43 kg (3 lb 2.4 oz) F CS-LTranv Spinal Y LOLI      Birth Comments: Pt had preeclapmsia         Patient has no known allergies.  Patient Active Problem List    Diagnosis Date Noted   • Postpartum care following  delivery 2019   • Modified White class C pregestational diabetes mellitus 2019        Hospital Course:   29 y.o. , now para 2, was admitted with the above mentioned diagnosis, underwent Repeat  repeat,  for repeat. Patient postpartum course was unremarkable, with progressive advancement in diet , ambulation and toleration of oral analgesia. Patient without complaints today and desires discharge.      Vitals:    19 2200 19 0200 19 0600 19 0615   BP: 134/81 141/88 143/109 151/99   Pulse: 82 85 97    Resp:     Temp: 36.8 °C (98.3 °F) 36.6 °C (97.8 °F) 36.5 °C (97.7 °F)    TempSrc: Temporal Temporal Temporal    SpO2: 95% 90% 92%    Weight:       Height:           Current Facility-Administered Medications   Medication Dose   • ibuprofen (MOTRIN) tablet 800 mg  800 mg   • oxyCODONE-acetaminophen (PERCOCET) 5-325 MG per tablet 1 Tab  1 Tab   • oxyCODONE-acetaminophen (PERCOCET) 5-325 MG per tablet 2 Tab  2 Tab   • ondansetron  (ZOFRAN) syringe/vial injection 4 mg  4 mg    Or   • ondansetron (ZOFRAN ODT) dispertab 4 mg  4 mg   • lactated ringers (LR) infusion     • LR infusion     • PRN oxytocin (PITOCIN) (20 Units/1000 mL) PRN for excessive uterine bleeding - See Admin Instr  125-999 mL/hr   • docusate sodium (COLACE) capsule 100 mg  100 mg   • miSOPROStol (CYTOTEC) tablet 800 mcg  800 mcg   • metFORMIN (GLUCOPHAGE) tablet 500 mg  500 mg       Exam:  Breast Exam: negative  Abdomen: Abdomen soft, non-tender. BS normal. No masses,  No organomegaly  Fundus Non Tender: yes  Incision: dry and intact  Perineum: perineum intact  Extremity: extremities, peripheral pulses and reflexes normal, no edema, redness or tenderness in the calves or thighs     Labs:  Recent Labs     08/06/19  0750 08/06/19  1854   WBC 11.6* 16.1*   RBC 4.51 4.23   HEMOGLOBIN 13.2 12.3   HEMATOCRIT 39.4 37.0   MCV 87.4 87.5   MCH 29.3 29.1   MCHC 33.5* 33.2*   RDW 45.3 44.7   PLATELETCT 254 244   MPV 10.7 10.5        Activity:   Discharge to home  Pelvic Rest x 6 weeks    Assessment:  normal postpartum course  Discharge Assessment: No areas of skin breakdown/redness; surgical incision intact/healing     Follow up: [facility] or [facility] in 5 weeks for vaginal ; 1 week for incision check.      Discharge Meds:   Current Outpatient Medications   Medication Sig Dispense Refill   • docusate sodium 100 MG Cap Take 100 mg by mouth 2 times a day as needed for Constipation. 60 Cap 2   • ibuprofen (MOTRIN) 800 MG Tab Take 1 Tab by mouth every 8 hours as needed (For cramping after delivery; do not give if patient is receiving ketorolac (Toradol)). 30 Tab 2   • oxyCODONE-acetaminophen (PERCOCET) 5-325 MG Tab Take 1 Tab by mouth every four hours as needed for up to 6 days. 30 Tab 0       ZULY Calvillo

## 2019-08-09 NOTE — LACTATION NOTE
This note was copied from a baby's chart.  MOB is gaining confidence with latching infant and it is more comfortable. Nipples are healing; review nipple care and the need to buy soothies for continued healing. POC is to feed infant on cue a minimum of 8x/24 hours with cluster feeding as normal. Discuss obtaining a pump from [facility] in [place]. If suboptimal latching, follow with pumping to feed back to infant. Follow up appt set for Tuesday @3:30 for lacation Consultation @[facility] in L&D.

## 2019-08-09 NOTE — PROGRESS NOTES
"Received report  from \" JUANITA Ambrose\"  on patient and assumed care.  Pt asleep at this time.  "

## 2019-08-09 NOTE — PROGRESS NOTES
Patient assessment completed. Discussed pain management plan and patient requests Motrin be provided as available. Patient denies dizziness and headaches; states she is voiding w/o difficulty and passing flatus. Reviewed plan of care, all questions answered, and rounding in place.

## 2019-08-09 NOTE — PROGRESS NOTES
I gave patient her pink packet, bands checked for accuracy, and infant already has discharge sleep sack  Patient education and discharge instructions reviewed with patient by Clarisse who also did the bilizap and pre & post ductal heart screening.    Patient verbalized understanding of instructions with me.  Patient states all questions have been answered and denies any problems.   Patient to be discharged home in stable condition with all belongings. LYNB is out running errands. Once he gets back, cord clamp and cuddles need to be removed and carseat checked for discharge.

## 2019-08-09 NOTE — PROGRESS NOTES
Assessment done.  Care plan reviewed and pt progressing according to caremap.  FOB at bedside and very supportive. Reviewed infant security measures with pt per hospital protocol.  Pt advised of emergency cords in her room .  Pt wearing supportive bra and encouraged to continue.  Pt using peribottle to perineum. Ambulating well by self.  Denies need for pain meds at this time. Pt advised to call for any needs.  Pt desires discharge today.

## 2019-08-09 NOTE — DISCHARGE INSTRUCTIONS
POSTPARTUM DISCHARGE INSTRUCTIONS FOR MOM    YOB: 1990   Age: 29 y.o.               Admit Date: 2019     Discharge Date: 2019  Attending Doctor:  Lexis Linton D.O.                  Allergies:  Patient has no known allergies.    Discharged to home by car. Discharged via wheelchair, hospital escort: Yes.  Special equipment needed: Not Applicable  Belongings with: Personal  Be sure to schedule a follow-up appointment with your primary care doctor or any specialists as instructed.     Discharge Plan:   Diet Plan: Discussed  Activity Level: Discussed  Confirmed Follow up Appointment: Patient to Call and Schedule Appointment  Confirmed Symptoms Management: Discussed  Medication Reconciliation Updated: Yes  Influenza Vaccine Indication: Indicated: Not available from distributor/    REASONS TO CALL YOUR OBSTETRICIAN:  1.   Persistent fever or shaking chills (Temperature higher than 100.4)  2.   Heavy bleeding (soaking more than 1 pad per hour); Passing clots  3.   Foul odor from vagina  4.   Mastitis (Breast infection; breast pain, chills, fever, redness)  5.   Urinary pain, burning or frequency  6.   Episiotomy infection  7.   Abdominal incision infection  8.   Severe depression longer than 24 hours    HAND WASHING  · Prior to handling the baby.  · Before breastfeeding or bottle feeding baby.  · After using the bathroom or changing the baby's diaper.    WOUND CARE  Ask your physician for additional care instructions.  In general:    ·  Incision:      · Keep clean and dry.    · Do NOT lift anything heavier than your baby for up to 6 weeks.    · There should not be any opening or pus.      VAGINAL CARE  · Nothing inside vagina for 6 weeks: no sexual intercourse, tampons or douching.  · Bleeding may continue for 2-4 weeks.  Amount may vary.    · Call your physician for heavy bleeding which means soaking more than 1 pad per hour    BIRTH CONTROL  · It is possible to become pregnant at  "any time after delivery and while breastfeeding.  · Plan to discuss a method of birth control with your physician at your follow up visit. visit.    DIET AND ELIMINATION  · Eating more fiber (bran cereal, fruits, and vegetables) and drinking plenty of fluids will help to avoid constipation.  · Urinary frequency after childbirth is normal.    POSTPARTUM BLUES  During the first few days after birth, you may experience a sense of the \"blues\" which may include impatience, irritability or even crying.  These feeling come and go quickly.  However, as many as 1 in 10 women experience emotional symptoms known as postpartum depression.    Postpartum depression:  May start as early as the second or third day after delivery or take several weeks or months to develop.  Symptoms of \"blues\" are present, but are more intense:  Crying spells; loss of appetite; feelings of hopelessness or loss of control; fear of touching the baby; over concern or no concern at all about the baby; little or no concern about your own appearance/caring for yourself; and/or inability to sleep or excessive sleeping.  Contact your physician if you are experiencing any of these symptoms.    Crisis Hotline:  · [place] Crisis Hotline:  0-496-VIFYGZF  Or 1-293.811.4992  · [place] Crisis Hotline:  1-730.280.2567  Or 564-288-2684    PREVENTING SHAKEN BABY:  If you are angry or stressed, PUT THE BABY IN THE CRIB, step away, take some deep breaths, and wait until you are calm to care for the baby.  DO NOT SHAKE THE BABY.  You are not alone, call a supporter for help.    · Crisis Call Center 24/7 crisis line 635-181-6268 or 1-743.772.1011  · You can also text them, text \"ANSWER\" to 915892    QUIT SMOKING/TOBACCO USE:  I understand the use of any tobacco products increases my chance of suffering from future heart disease and could cause other illnesses which may shorten my life. Quitting the use of tobacco products is the single most important thing I can do to " improve my health. For further information on smoking / tobacco cessation call a Toll Free Quit Line at 1-921.402.1402 (*[employer] [place]) or 1-572.554.6201 (American Lung Association) or you can access the web based program at www.lungusa.org.    · [place] Tobacco Users Help Line:  (255) 439-8407       Toll Free: 1-864.477.9969  · Quit Tobacco Program [facility] (512)092-5619    DEPRESSION / SUICIDE RISK:  As you are discharged from this [facility], it is important to learn how to keep safe from harming yourself.    Recognize the warning signs:  · Abrupt changes in personality, positive or negative- including increase in energy   · Giving away possessions  · Change in eating patterns- significant weight changes-  positive or negative  · Change in sleeping patterns- unable to sleep or sleeping all the time   · Unwillingness or inability to communicate  · Depression  · Unusual sadness, discouragement and loneliness  · Talk of wanting to die  · Neglect of personal appearance   · Rebelliousness- reckless behavior  · Withdrawal from people/activities they love  · Confusion- inability to concentrate     If you or a loved one observes any of these behaviors or has concerns about self-harm, here's what you can do:  · Talk about it- your feelings and reasons for harming yourself  · Remove any means that you might use to hurt yourself (examples: pills, rope, extension cords, firearm)  · Get professional help from the community (Mental Health, Substance Abuse, psychological counseling)  · Do not be alone:Call your Safe Contact- someone whom you trust who will be there for you.  · Call your local CRISIS HOTLINE 038-3018 or 365-638-3264  · Call your local Children's Mobile Crisis Response Team [place] (869) 706-9043 or www.[facility]  · Call the toll free National Suicide Prevention Hotlines   · National Suicide Prevention Lifeline 267-444-REOE (5271)  · National Hope Line  [facility] 800-SUICIDE (921-3460)    DISCHARGE SURVEY:  Thank you for choosing [facility].  We hope we provided you with very good care.  You may be receiving a survey in the mail.  Please fill it out.  Your opinion is valuable to us.    ADDITIONAL EDUCATIONAL MATERIALS GIVEN TO PATIENT:        My signature on this form indicates that:  1.  I have reviewed and understand the above information  2.  My questions regarding this information have been answered to my satisfaction.  3.  I have formulated a plan with my discharge nurse to obtain my prescribed medication for home.

## 2019-08-14 ENCOUNTER — POST PARTUM (OUTPATIENT)
Dept: OBGYN | Facility: CLINIC | Age: 29
End: 2019-08-14
Payer: COMMERCIAL

## 2019-08-14 VITALS — WEIGHT: 169 LBS | SYSTOLIC BLOOD PRESSURE: 120 MMHG | BODY MASS INDEX: 33.01 KG/M2 | DIASTOLIC BLOOD PRESSURE: 70 MMHG

## 2019-08-14 DIAGNOSIS — O24.319 MODIFIED WHITE CLASS C PREGESTATIONAL DIABETES MELLITUS: ICD-10-CM

## 2019-08-14 PROCEDURE — 99024 POSTOP FOLLOW-UP VISIT: CPT | Performed by: OBSTETRICS & GYNECOLOGY

## 2019-08-14 ASSESSMENT — EDINBURGH POSTNATAL DEPRESSION SCALE (EPDS)
I HAVE BLAMED MYSELF UNNECESSARILY WHEN THINGS WENT WRONG: NO, NEVER
I HAVE BEEN ABLE TO LAUGH AND SEE THE FUNNY SIDE OF THINGS: AS MUCH AS I ALWAYS COULD
I HAVE BEEN ANXIOUS OR WORRIED FOR NO GOOD REASON: NO, NOT AT ALL
I HAVE BEEN SO UNHAPPY THAT I HAVE BEEN CRYING: NO, NEVER
I HAVE FELT SCARED OR PANICKY FOR NO GOOD REASON: NO, NOT AT ALL
TOTAL SCORE: 1
I HAVE BEEN SO UNHAPPY THAT I HAVE HAD DIFFICULTY SLEEPING: NOT AT ALL
THINGS HAVE BEEN GETTING ON TOP OF ME: NO, MOST OF THE TIME I HAVE COPED QUITE WELL
THE THOUGHT OF HARMING MYSELF HAS OCCURRED TO ME: NEVER
I HAVE FELT SAD OR MISERABLE: NO, NOT AT ALL
I HAVE LOOKED FORWARD WITH ENJOYMENT TO THINGS: AS MUCH AS I EVER DID

## 2019-08-14 NOTE — PROGRESS NOTES
Incision Check    CC: s/p c/s incision check    HPI: 29 y.o.  s/p  delivery on 19 with Dr. Linton for repeat here for incision check.   Pt reports doing well with minimal pain.  No fevers.  Denies trouble with urination or BM.  Minimal vaginal bleeding.    BFing which is going well.     Denies concerns about mood.   EDPDS: 1    /70   Wt 76.7 kg (169 lb)   BMI 33.01 kg/m²   Gen: AAO, NAD  Abd: soft, NT, ND, incision C/D/I, healing well    A/P: 29 y.o.  s/p c/s doing well  - no signs of postop complications  - encouraged BFing, lactation visit prn  - no signs of PP depression  - contraception: MIrena appt scheduled  - referral for PCP placed to follow up for DM2      RTC for routine postpartum visit in approx 3-4wks

## 2019-08-14 NOTE — PROGRESS NOTES
C Section check. Had C Section on 8/6/19.  States doing okay. pain controled with ibuprofen.  Vag bleeding is light  Pt is breast feeding

## 2019-08-22 ENCOUNTER — TELEPHONE (OUTPATIENT)
Dept: SCHEDULING | Facility: IMAGING CENTER | Age: 29
End: 2019-08-22

## 2019-08-27 ENCOUNTER — OFFICE VISIT (OUTPATIENT)
Dept: MEDICAL GROUP | Facility: PHYSICIAN GROUP | Age: 29
End: 2019-08-27
Payer: COMMERCIAL

## 2019-08-27 VITALS
BODY MASS INDEX: 33.38 KG/M2 | HEART RATE: 80 BPM | OXYGEN SATURATION: 96 % | SYSTOLIC BLOOD PRESSURE: 110 MMHG | TEMPERATURE: 98.6 F | RESPIRATION RATE: 20 BRPM | HEIGHT: 59 IN | WEIGHT: 165.6 LBS | DIASTOLIC BLOOD PRESSURE: 80 MMHG

## 2019-08-27 DIAGNOSIS — Z00.00 ANNUAL PHYSICAL EXAM: ICD-10-CM

## 2019-08-27 DIAGNOSIS — E83.51 SERUM CALCIUM DECREASED: ICD-10-CM

## 2019-08-27 DIAGNOSIS — Z76.89 ENCOUNTER TO ESTABLISH CARE: ICD-10-CM

## 2019-08-27 PROCEDURE — 99203 OFFICE O/P NEW LOW 30 MIN: CPT | Performed by: NURSE PRACTITIONER

## 2019-08-27 ASSESSMENT — PATIENT HEALTH QUESTIONNAIRE - PHQ9: CLINICAL INTERPRETATION OF PHQ2 SCORE: 0

## 2019-08-27 NOTE — ASSESSMENT & PLAN NOTE
Is a new issue.  Reviewed postpartum labs from 8/6/2019, noted slightly decreased serum calcium level at 8.3.   Ref. Range 8/6/2019 07:50   Calcium Latest Ref Range: 8.5 - 10.5 mg/dL 8.3 (L)

## 2019-08-27 NOTE — ASSESSMENT & PLAN NOTE
Patient is here to establish, no issues today.  Postpartum since 2019.  Patient verbalizing feeling well, denies symptoms of postpartum depression, no issues with breast-feeding.  Patient had a , being followed by OB/GYN, denies abdominal pain, noted no diarrhea or constipation.  Patient denies fevers, shortness of breath, CP, NVD.

## 2019-08-27 NOTE — PROGRESS NOTES
Chief Complaint   Patient presents with   • New Patient     establish care       HISTORY OF PRESENT ILLNESS: Patient is a 29 y.o. female, new  patient who presents today to discuss medical problems as listed below:    Health Maintenance:  COMPLETED. Patient states last Pap completed postpartum and is normal.    Serum calcium decreased  Is a new issue.  Reviewed postpartum labs from 2019, noted slightly decreased serum calcium level at 8.3.   Ref. Range 2019 07:50   Calcium Latest Ref Range: 8.5 - 10.5 mg/dL 8.3 (L)       Encounter to establish care  Patient is here to establish, no issues today.  Postpartum since 2019.  Patient verbalizing feeling well, denies symptoms of postpartum depression, no issues with breast-feeding.  Patient had a , being followed by OB/GYN, denies abdominal pain, noted no diarrhea or constipation.  Patient denies fevers, shortness of breath, CP, NVD.      Patient Active Problem List    Diagnosis Date Noted   • Annual physical exam 2019   • Encounter to establish care 2019   • Serum calcium decreased 2019   • Postpartum care following  delivery 2019   • Modified White class C pregestational diabetes mellitus 2019        Allergies: Patient has no known allergies.    Current Outpatient Medications   Medication Sig Dispense Refill   • ibuprofen (MOTRIN) 800 MG Tab Take 1 Tab by mouth every 8 hours as needed (For cramping after delivery; do not give if patient is receiving ketorolac (Toradol)). 30 Tab 2   • Prenatal MV-Min-Fe Fum-FA-DHA (PRENATAL 1 PO) Take  by mouth.       No current facility-administered medications for this visit.        Social History     Tobacco Use   • Smoking status: Never Smoker   • Smokeless tobacco: Never Used   Substance Use Topics   • Alcohol use: No   • Drug use: No     Social History     Social History Narrative   • Not on file       Family History   Problem Relation Age of Onset   • Hypertension Mother   "  • Hyperlipidemia Father        Allergies, past medical history, past surgical history, family history, social history reviewed and updated.    Review of Systems:      - Constitutional: Negative for fever, chills, unexpected weight change, and fatigue/generalized weakness.     - HEENT: Negative for headaches, vision changes, hearing changes, ear pain, ear discharge, rhinorrhea, sinus congestion, sore throat, and neck pain.      - Respiratory: Negative for cough, sputum production, chest congestion, dyspnea, wheezing, and crackles.      - Cardiovascular: Negative for chest pain, palpitations, orthopnea, and bilateral lower extremity edema.     - Gastrointestinal: Negative for heartburn, nausea, vomiting, abdominal pain, hematochezia, melena, diarrhea, constipation, and greasy/foul-smelling stools.     - Genitourinary: Negative for dysuria, polyuria, hematuria, pyuria, urinary urgency, and urinary incontinence.    - Musculoskeletal: Negative for myalgias, back pain, and joint pain.     - Skin: Negative for rash, itching, cyanotic skin color change.     - Neurological: Negative for dizziness, tingling, tremors, focal sensory deficit, focal weakness and headaches.     - Endo/Heme/Allergies: Does not bruise/bleed easily.     - Psychiatric/Behavioral: Negative for depression, suicidal/homicidal ideation and memory loss.      All other systems reviewed and are negative    Exam:    /80   Pulse 80   Temp 37 °C (98.6 °F)   Resp 20   Ht 1.49 m (4' 10.66\")   Wt 75.1 kg (165 lb 9.6 oz)   SpO2 96%   BMI 33.83 kg/m²   Body mass index is 33.83 kg/m².    Physical Exam:  Constitutional: Well-developed and well-nourished. Not diaphoretic. No distress.   Skin: Skin is warm and dry. No rash noted.  Head: Atraumatic without lesions.  Eyes: Conjunctivae and extraocular motions are normal. Pupils are equal, round, and reactive to light. No scleral icterus.   Ears:  External ears unremarkable. Tympanic membranes clear and " intact.  Nose: Nares patent. Septum midline. Turbinates without erythema nor edema. No discharge.   Mouth/Throat: Dentition is normal. Tongue normal. Oropharynx is clear and moist. Posterior pharynx without erythema or exudates.  Neck: Supple, trachea midline. Normal range of motion. No thyromegaly present. No lymphadenopathy--cervical or supraclavicular.  Cardiovascular: Regular rate and rhythm, S1 and S2 without murmur, rubs, or gallops.    Chest: Effort normal. Clear to auscultation throughout. No adventitious sounds. No CVA tenderness.  Abdomen: Soft, non tender, and without distention. Active bowel sounds in all four quadrants. No rebound, guarding, masses or HSM.  : Negative for dysuria, polyuria, hematuria, pyuria, urinary urgency, and urinary incontinence.  Extremities: No cyanosis, clubbing, erythema, nor edema. Distal pulses intact and symmetric.   Musculoskeletal: All major joints AROM full in all directions without pain.  Neurological: Alert and oriented x 3. DTRs 2+/3 and symmetric. No cranial nerve deficit. 5/5 myotomes. Sensation intact. Negative Rhomberg.  Psychiatric:  Behavior, mood, and affect are appropriate.    LABS: 8/6/2019 results reviewed and discussed with the patient, questions answered.    Assessment/Plan:  1. Annual physical exam  - FREE THYROXINE; Future  - TSH; Future  - Lipid Profile; Future  - HEMOGLOBIN A1C; Future  - VITAMIN D,25 HYDROXY; Future  - CBC WITH DIFFERENTIAL; Future  - Comp Metabolic Panel; Future    2. Encounter to establish care    3. Serum calcium decreased  Stable.  Recommend calcium sources in nutrition, such as dairy products yogurts,kefir, cheese, also green leafy vegetables.  Will evaluate labs for vitamin D and make further recommendations at that time.    Discussed with patient possible alternative diagnoses, pt is to take all medications as prescribed. If symptoms persist FU w/PCP, if symptoms worsen go to emergency room.  Reviewed indication, dosage, usage  and potential adverse effects of prescribed medications. Reviewed risks and benefits of treatment plan. Patient verbalizes understanding of all instruction and verbally agrees to plan.    No follow-ups on file.

## 2019-09-04 ENCOUNTER — HOSPITAL ENCOUNTER (OUTPATIENT)
Dept: LAB | Facility: MEDICAL CENTER | Age: 29
End: 2019-09-04
Attending: NURSE PRACTITIONER
Payer: COMMERCIAL

## 2019-09-04 DIAGNOSIS — Z00.00 ANNUAL PHYSICAL EXAM: ICD-10-CM

## 2019-09-04 LAB
25(OH)D3 SERPL-MCNC: 19 NG/ML (ref 30–100)
ALBUMIN SERPL BCP-MCNC: 4.1 G/DL (ref 3.2–4.9)
ALBUMIN/GLOB SERPL: 1.3 G/DL
ALP SERPL-CCNC: 84 U/L (ref 30–99)
ALT SERPL-CCNC: 40 U/L (ref 2–50)
ANION GAP SERPL CALC-SCNC: 9 MMOL/L (ref 0–11.9)
AST SERPL-CCNC: 30 U/L (ref 12–45)
BASOPHILS # BLD AUTO: 1.7 % (ref 0–1.8)
BASOPHILS # BLD: 0.15 K/UL (ref 0–0.12)
BILIRUB SERPL-MCNC: 0.5 MG/DL (ref 0.1–1.5)
BUN SERPL-MCNC: 16 MG/DL (ref 8–22)
CALCIUM SERPL-MCNC: 9 MG/DL (ref 8.5–10.5)
CHLORIDE SERPL-SCNC: 105 MMOL/L (ref 96–112)
CHOLEST SERPL-MCNC: 162 MG/DL (ref 100–199)
CO2 SERPL-SCNC: 27 MMOL/L (ref 20–33)
CREAT SERPL-MCNC: 0.96 MG/DL (ref 0.5–1.4)
EOSINOPHIL # BLD AUTO: 0.36 K/UL (ref 0–0.51)
EOSINOPHIL NFR BLD: 4 % (ref 0–6.9)
ERYTHROCYTE [DISTWIDTH] IN BLOOD BY AUTOMATED COUNT: 46.4 FL (ref 35.9–50)
EST. AVERAGE GLUCOSE BLD GHB EST-MCNC: 117 MG/DL
FASTING STATUS PATIENT QL REPORTED: NORMAL
GLOBULIN SER CALC-MCNC: 3.2 G/DL (ref 1.9–3.5)
GLUCOSE SERPL-MCNC: 83 MG/DL (ref 65–99)
HBA1C MFR BLD: 5.7 % (ref 0–5.6)
HCT VFR BLD AUTO: 46.1 % (ref 37–47)
HDLC SERPL-MCNC: 43 MG/DL
HGB BLD-MCNC: 14.4 G/DL (ref 12–16)
IMM GRANULOCYTES # BLD AUTO: 0.01 K/UL (ref 0–0.11)
IMM GRANULOCYTES NFR BLD AUTO: 0.1 % (ref 0–0.9)
LDLC SERPL CALC-MCNC: 84 MG/DL
LYMPHOCYTES # BLD AUTO: 3.18 K/UL (ref 1–4.8)
LYMPHOCYTES NFR BLD: 35.5 % (ref 22–41)
MCH RBC QN AUTO: 28.3 PG (ref 27–33)
MCHC RBC AUTO-ENTMCNC: 31.2 G/DL (ref 33.6–35)
MCV RBC AUTO: 90.6 FL (ref 81.4–97.8)
MONOCYTES # BLD AUTO: 0.67 K/UL (ref 0–0.85)
MONOCYTES NFR BLD AUTO: 7.5 % (ref 0–13.4)
NEUTROPHILS # BLD AUTO: 4.58 K/UL (ref 2–7.15)
NEUTROPHILS NFR BLD: 51.2 % (ref 44–72)
NRBC # BLD AUTO: 0 K/UL
NRBC BLD-RTO: 0 /100 WBC
PLATELET # BLD AUTO: 350 K/UL (ref 164–446)
PMV BLD AUTO: 9.9 FL (ref 9–12.9)
POTASSIUM SERPL-SCNC: 3.9 MMOL/L (ref 3.6–5.5)
PROT SERPL-MCNC: 7.3 G/DL (ref 6–8.2)
RBC # BLD AUTO: 5.09 M/UL (ref 4.2–5.4)
SODIUM SERPL-SCNC: 141 MMOL/L (ref 135–145)
T4 FREE SERPL-MCNC: 0.86 NG/DL (ref 0.53–1.43)
TRIGL SERPL-MCNC: 176 MG/DL (ref 0–149)
TSH SERPL DL<=0.005 MIU/L-ACNC: 2.37 UIU/ML (ref 0.38–5.33)
WBC # BLD AUTO: 9 K/UL (ref 4.8–10.8)

## 2019-09-04 PROCEDURE — 83036 HEMOGLOBIN GLYCOSYLATED A1C: CPT

## 2019-09-04 PROCEDURE — 84439 ASSAY OF FREE THYROXINE: CPT

## 2019-09-04 PROCEDURE — 85025 COMPLETE CBC W/AUTO DIFF WBC: CPT

## 2019-09-04 PROCEDURE — 80053 COMPREHEN METABOLIC PANEL: CPT

## 2019-09-04 PROCEDURE — 80061 LIPID PANEL: CPT

## 2019-09-04 PROCEDURE — 36415 COLL VENOUS BLD VENIPUNCTURE: CPT

## 2019-09-04 PROCEDURE — 84443 ASSAY THYROID STIM HORMONE: CPT

## 2019-09-04 PROCEDURE — 82306 VITAMIN D 25 HYDROXY: CPT

## 2019-09-11 ENCOUNTER — POST PARTUM (OUTPATIENT)
Dept: OBGYN | Facility: CLINIC | Age: 29
End: 2019-09-11
Payer: COMMERCIAL

## 2019-09-11 ENCOUNTER — OFFICE VISIT (OUTPATIENT)
Dept: MEDICAL GROUP | Facility: PHYSICIAN GROUP | Age: 29
End: 2019-09-11
Payer: COMMERCIAL

## 2019-09-11 VITALS
SYSTOLIC BLOOD PRESSURE: 102 MMHG | TEMPERATURE: 98.1 F | WEIGHT: 169 LBS | RESPIRATION RATE: 18 BRPM | OXYGEN SATURATION: 97 % | DIASTOLIC BLOOD PRESSURE: 70 MMHG | BODY MASS INDEX: 35.48 KG/M2 | HEART RATE: 78 BPM | HEIGHT: 58 IN

## 2019-09-11 VITALS — DIASTOLIC BLOOD PRESSURE: 74 MMHG | WEIGHT: 169 LBS | SYSTOLIC BLOOD PRESSURE: 118 MMHG | BODY MASS INDEX: 34.53 KG/M2

## 2019-09-11 DIAGNOSIS — E78.1 HIGH BLOOD TRIGLYCERIDES: ICD-10-CM

## 2019-09-11 DIAGNOSIS — E55.9 VITAMIN D DEFICIENCY: ICD-10-CM

## 2019-09-11 PROBLEM — Z00.00 ANNUAL PHYSICAL EXAM: Status: RESOLVED | Noted: 2019-08-27 | Resolved: 2019-09-11

## 2019-09-11 PROBLEM — Z76.89 ENCOUNTER TO ESTABLISH CARE: Status: RESOLVED | Noted: 2019-08-27 | Resolved: 2019-09-11

## 2019-09-11 PROCEDURE — 99213 OFFICE O/P EST LOW 20 MIN: CPT | Performed by: NURSE PRACTITIONER

## 2019-09-11 PROCEDURE — 0503F POSTPARTUM CARE VISIT: CPT | Performed by: NURSE PRACTITIONER

## 2019-09-11 RX ORDER — ERGOCALCIFEROL 1.25 MG/1
50000 CAPSULE ORAL
Qty: 12 CAP | Refills: 0 | Status: SHIPPED | OUTPATIENT
Start: 2019-09-11 | End: 2020-06-01

## 2019-09-11 ASSESSMENT — ENCOUNTER SYMPTOMS
MUSCULOSKELETAL NEGATIVE: 1
CARDIOVASCULAR NEGATIVE: 1
EYES NEGATIVE: 1
CONSTITUTIONAL NEGATIVE: 1
GASTROINTESTINAL NEGATIVE: 1
PSYCHIATRIC NEGATIVE: 1
NEUROLOGICAL NEGATIVE: 1
RESPIRATORY NEGATIVE: 1

## 2019-09-11 NOTE — PROGRESS NOTES
Subjective:      Angelina Jarquin is a 29 y.o.  female who presents for her postpartum exam. She had a R C/S without complication. Her prenatal course was complicated by pre-gestational Type 2 daibetes. She has already established care with a PCP to follow her medical condition. She denies dysuria,odor, itching or breast problems. She report she is continuing to spot and that when she has a bowel movement she notes a small amount of blood. She reports low abd pain with physical exertion. She denies painful BM or hemorroids. She is both bottle and breast feeding with no problems. She desires an IUD for her birth control method and has an appt for placement. Reports no sex prior to this appointment.  Denies any S/S of PP depression.    Assessment   Assessment:    1. PP care of lactating women   2. Exam WNL   3. Pap WNL on 2019  4. Desires contraception     Patient Active Problem List    Diagnosis Date Noted   • Serum calcium decreased 2019   • Postpartum care following  delivery 2019   • Modified White class C pregestational diabetes mellitus 2019       Plan   Plan:    1. Breastfeeding support   2. Continue PNV   3. Contraceptive counseling - follow up with appt for contraceptive management  4. Encouraged to defer sexual activity until after IUD placement  5. Discussed diet, exercise and resumption of sexual activity and work  6. Gave copy of pap   7.  F/u c PCP or [facility] clinic as needed for primary care needs.     HPI    Review of Systems   Constitutional: Negative.    HENT: Negative.    Eyes: Negative.    Respiratory: Negative.    Cardiovascular: Negative.    Gastrointestinal: Negative.    Genitourinary: Negative.    Musculoskeletal: Negative.    Skin: Negative.    Neurological: Negative.    Endo/Heme/Allergies: Negative.    Psychiatric/Behavioral: Negative.           Objective:     /74   Wt 76.7 kg (169 lb)   BMI 34.53 kg/m²      Physical Exam   Constitutional: She is  oriented to person, place, and time. She appears well-developed and well-nourished.   Pulmonary/Chest: Effort normal.   Abdominal: Soft.   Genitourinary: Vagina normal and uterus normal. Rectal exam shows no tenderness. Pelvic exam was performed with patient supine. No labial fusion. There is no rash, tenderness, lesion or injury on the right labia. There is no rash, tenderness, lesion or injury on the left labia. No erythema, tenderness or bleeding in the vagina. No signs of injury around the vagina. No vaginal discharge found.   Neurological: She is alert and oriented to person, place, and time.   Skin: Skin is warm and dry.   Psychiatric: She has a normal mood and affect. Her behavior is normal. Judgment and thought content normal.               Assessment/Plan:     1. Encounter for postpartum care of lactating mother

## 2019-09-11 NOTE — PROGRESS NOTES
Pt here today for postpartum exam, pt delivered on 8/6/19  Currently breast feeding.   BCM: , Pt states has an appt on 9/13/19 for Mirena insertion.   Pt states still having bleeding.   Good ph: [phone]  Chaperone offered and not needed.   Last pap smear 1/29/19 WNL

## 2019-09-11 NOTE — PROGRESS NOTES
Chief Complaint   Patient presents with   • Labs Only     review       HISTORY OF PRESENT ILLNESS: Patient is a 29 y.o. female, established patient who presents today to discuss medical problems as listed below:    Vitamin D deficiency  Reviewed recent lab values from 2019, vitamin D at 19, normal values are .    High blood triglycerides  Reviewed recent labs, noted elevated triglycerides is 176, normal values below 149.      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    Current Outpatient Medications   Medication Sig Dispense Refill   • vitamin D, Ergocalciferol, (DRISDOL) 80485 units Cap capsule Take 1 Cap by mouth every 7 days. 12 Cap 0   • ibuprofen (MOTRIN) 800 MG Tab Take 1 Tab by mouth every 8 hours as needed (For cramping after delivery; do not give if patient is receiving ketorolac (Toradol)). 30 Tab 2   • Prenatal MV-Min-Fe Fum-FA-DHA (PRENATAL 1 PO) Take  by mouth.       No current facility-administered medications for this visit.        Social History     Tobacco Use   • Smoking status: Never Smoker   • Smokeless tobacco: Never Used   Substance Use Topics   • Alcohol use: No   • Drug use: No     Social History     Social History Narrative   • Not on file       Family History   Problem Relation Age of Onset   • Hypertension Mother    • Hyperlipidemia Father        Allergies, past medical history, past surgical history, family history, social history reviewed and updated.    Review of Systems:      - Constitutional: Negative for fever, chills, unexpected weight change, and fatigue/generalized weakness.       - Respiratory: Negative for cough, sputum production, chest congestion, dyspnea, wheezing,  "and crackles.      - Cardiovascular: Negative for chest pain, palpitations, orthopnea, and bilateral lower extremity edema.     - Psychiatric/Behavioral: Negative for depression, suicidal/homicidal ideation and memory loss.      All other systems reviewed and are negative    Exam:    /70   Pulse 78   Temp 36.7 °C (98.1 °F) (Temporal)   Resp 18   Ht 1.473 m (4' 10\")   Wt 76.7 kg (169 lb)   SpO2 97%   BMI 35.32 kg/m²  Body mass index is 35.32 kg/m².    Physical Exam:  Constitutional: Well-developed and well-nourished. Not diaphoretic. No distress.   Cardiovascular: Regular rate and rhythm, S1 and S2 without murmur, rubs, or gallops.    Chest: Effort normal. Clear to auscultation throughout. No adventitious sounds.   Psychiatric:  Behavior, mood, and affect are appropriate.    LABS: 9/4/19  results reviewed and discussed with the patient, questions answered.    Patient was seen for 15 minutes face to face of which > 50% of appointment time was spent on counseling and coordination of care regarding the above.     Assessment/Plan:  1. Vitamin D deficiency  Uncontrolled, stable.  After Rx completion, take OTC vitamin D3 2000 IUs daily.   - vit D2, 81779 IUs every 7 days for 12 weeks    2. High blood triglycerides  Discussed healthy lifestyle, including exercise and healthy nutrition.  Avoid simple carbohydrates, such as juices, milk, sodas, sweets, greasy and fried foods.  Incorporate fiber, protein and healthy fats in your diet.  Include vegetables in diet.    Discussed with patient possible alternative diagnoses, pt is to take all medications as prescribed. If symptoms persist FU w/PCP, if symptoms worsen go to emergency room.  Reviewed indication, dosage, usage and potential adverse effects of prescribed medications. Reviewed risks and benefits of treatment plan. Patient verbalizes understanding of all instruction and verbally agrees to plan.    Return in about 1 year (around 9/11/2020), or if symptoms " worsen or fail to improve.

## 2019-09-26 ENCOUNTER — GYNECOLOGY VISIT (OUTPATIENT)
Dept: OBGYN | Facility: CLINIC | Age: 29
End: 2019-09-26
Payer: COMMERCIAL

## 2019-09-26 VITALS — WEIGHT: 168 LBS | SYSTOLIC BLOOD PRESSURE: 120 MMHG | BODY MASS INDEX: 35.11 KG/M2 | DIASTOLIC BLOOD PRESSURE: 84 MMHG

## 2019-09-26 DIAGNOSIS — Z30.430 ENCOUNTER FOR INSERTION OF MIRENA IUD: ICD-10-CM

## 2019-09-26 DIAGNOSIS — O24.410 DIET CONTROLLED GESTATIONAL DIABETES MELLITUS (GDM), ANTEPARTUM: ICD-10-CM

## 2019-09-26 LAB
INT CON NEG: NEGATIVE
INT CON POS: POSITIVE
POC URINE PREGNANCY TEST: NEGATIVE

## 2019-09-26 PROCEDURE — 58300 INSERT INTRAUTERINE DEVICE: CPT | Performed by: OBSTETRICS & GYNECOLOGY

## 2019-09-26 PROCEDURE — 81025 URINE PREGNANCY TEST: CPT | Mod: QW | Performed by: OBSTETRICS & GYNECOLOGY

## 2019-09-26 NOTE — PROGRESS NOTES
29 y.o.    Female presents here today for her IUD insertion:     No LMP recorded.      Today the patient is counseled on the risks of IUD insertion. I also discussed with the patient the risk of infection on insertion, and had asked the patient to remain on pelvic rest for one week following the insertion. We also discussed the risk of IUD expulsion, the risk of uterine perforation and IUD migration. If the IUD does migrate the patient may require a separate procedure such as a laparoscopy to retrieve the migrated IUD. I also discussed the 1% risk of pregnancy with IUD use. Also discussed with patient today increased risk of ectopic pregnancy with IUD use. Also discussed today the possibility that IUD may need to be removed secondary to bleeding profile or pain. Also discussed were the possibility that partner can feel the IUD during intercourse. I also discussed the side effects of Mirena which can be amenorrhea or dysfunctional uterine bleeding or spotting.  Patient had the opportunity to ask questions regarding insertion, risks and benefits, all questions are answered in their entirety.  Informed consent is signed.    Procedure note  Urine pregnancy test is negative, informed consent was previously signed  The bimanual exam is performed the uterus is noted to be 8 weeks in size and is mid position  A speculum was inserted into the vagina, the cervix was cleansed with Betadine swabs x3  Tenaculum was placed on the anterior lip of the cervix  The uterus was sounded to a depth of 8 centimeters  The IUD is placed under sterile conditions,   The strings trimmed to approximately 3 cm  Tenaculum was removed from the cervix and hemostasis was achieved with silver nitrate  The patient tolerated the procedure well    IUD lot number  [id number], expiration date 2021    Patient is asked to followup in 4 to 6 weeks for IUD check. The patient is asked to remain on pelvic rest for one week. She is asked to return  to office sooner as needed for heavy vaginal bleeding, uncontrolled pain, fever, or any other concerns.    Patient also had diet-controlled gestational diabetes during pregnancy.  Ordered 2-hour glucose tolerance test to assess for resolution

## 2019-09-26 NOTE — NON-PROVIDER
Pt here today for Insertion of Mirena IUD   UPT Result: Negative   LMP: 08/2018 Irregular periods   Current BCM: none   Phone #: [phone]   Pharmacy verified and confirmed

## 2019-09-26 NOTE — LETTER
September 26, 2019       Patient: Angelina Jarquin   YOB: 1990   Date of Visit: 9/26/2019         To Whom It May Concern:    It is my medical opinion that Angelina Jarquin is able to go back to work with no restrictions.     If you have any questions or concerns, please don't hesitate to call [phone]          Sincerely,          Luca Tejeda M.D.  Electronically Signed

## 2019-10-08 ENCOUNTER — HOSPITAL ENCOUNTER (OUTPATIENT)
Dept: LAB | Facility: MEDICAL CENTER | Age: 29
End: 2019-10-08
Attending: OBSTETRICS & GYNECOLOGY
Payer: COMMERCIAL

## 2019-10-08 DIAGNOSIS — R73.09 ELEVATED GLUCOSE: ICD-10-CM

## 2019-10-08 DIAGNOSIS — O24.410 DIET CONTROLLED GESTATIONAL DIABETES MELLITUS (GDM), ANTEPARTUM: ICD-10-CM

## 2019-10-08 LAB — GLUCOSE 2H P GLC SERPL-MCNC: 196 MG/DL (ref 70–110)

## 2019-10-08 PROCEDURE — 82947 ASSAY GLUCOSE BLOOD QUANT: CPT

## 2019-10-08 PROCEDURE — 36415 COLL VENOUS BLD VENIPUNCTURE: CPT

## 2020-04-14 ENCOUNTER — HOSPITAL ENCOUNTER (OUTPATIENT)
Facility: MEDICAL CENTER | Age: 30
End: 2020-04-14
Attending: NURSE PRACTITIONER
Payer: COMMERCIAL

## 2020-04-14 ENCOUNTER — OFFICE VISIT (OUTPATIENT)
Dept: MEDICAL GROUP | Facility: PHYSICIAN GROUP | Age: 30
End: 2020-04-14
Payer: COMMERCIAL

## 2020-04-14 VITALS
TEMPERATURE: 97.6 F | BODY MASS INDEX: 25.8 KG/M2 | RESPIRATION RATE: 20 BRPM | HEART RATE: 95 BPM | OXYGEN SATURATION: 95 % | DIASTOLIC BLOOD PRESSURE: 74 MMHG | HEIGHT: 70 IN | SYSTOLIC BLOOD PRESSURE: 122 MMHG | WEIGHT: 180.2 LBS

## 2020-04-14 DIAGNOSIS — N89.8 VAGINAL PRURITUS: ICD-10-CM

## 2020-04-14 DIAGNOSIS — B37.31 VAGINAL CANDIDIASIS: ICD-10-CM

## 2020-04-14 LAB — AMBIGUOUS DTTM AMBI4: NORMAL

## 2020-04-14 PROCEDURE — 81002 URINALYSIS NONAUTO W/O SCOPE: CPT | Performed by: NURSE PRACTITIONER

## 2020-04-14 PROCEDURE — 87480 CANDIDA DNA DIR PROBE: CPT

## 2020-04-14 PROCEDURE — 87660 TRICHOMONAS VAGIN DIR PROBE: CPT

## 2020-04-14 PROCEDURE — 99213 OFFICE O/P EST LOW 20 MIN: CPT | Performed by: NURSE PRACTITIONER

## 2020-04-14 PROCEDURE — 87510 GARDNER VAG DNA DIR PROBE: CPT

## 2020-04-14 RX ORDER — FLUCONAZOLE 150 MG/1
150 TABLET ORAL DAILY
Qty: 1 TAB | Refills: 1 | Status: SHIPPED | OUTPATIENT
Start: 2020-04-14 | End: 2020-04-15

## 2020-04-14 ASSESSMENT — FIBROSIS 4 INDEX: FIB4 SCORE: 0.41

## 2020-04-14 ASSESSMENT — PATIENT HEALTH QUESTIONNAIRE - PHQ9: CLINICAL INTERPRETATION OF PHQ2 SCORE: 0

## 2020-04-14 NOTE — PROGRESS NOTES
Chief Complaint   Patient presents with   • UTI     x4 days; mirena iud        HISTORY OF PRESENT ILLNESS: Patient is a 29 y.o. female, established patient who presents today to discuss medical problems as listed below:    Vaginal candidiasis  New problem. Vaginal pruritis and abnormal white vaginal discharge. No other associated s/s. She denies fevers, flank, pelvic or mid back pain, no dysuria, frequency, urgency, no blood in urine. Has tried nothing OTC. Not a diabetic.   Unfortunately unable to run POC UA d/t strips being . Running vaginal pathogen DNA panel.      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    Current Outpatient Medications   Medication Sig Dispense Refill   • fluconazole (DIFLUCAN) 150 MG tablet Take 1 Tab by mouth every day for 1 day. Okay to repeat one week later if symptoms persist 1 Tab 1   • Prenatal MV-Min-Fe Fum-FA-DHA (PRENATAL 1 PO) Take  by mouth.     • vitamin D, Ergocalciferol, (DRISDOL) 77275 units Cap capsule Take 1 Cap by mouth every 7 days. (Patient not taking: Reported on 2019) 12 Cap 0   • ibuprofen (MOTRIN) 800 MG Tab Take 1 Tab by mouth every 8 hours as needed (For cramping after delivery; do not give if patient is receiving ketorolac (Toradol)). (Patient not taking: Reported on 2019) 30 Tab 2     No current facility-administered medications for this visit.        Social History     Tobacco Use   • Smoking status: Never Smoker   • Smokeless tobacco: Never Used   Substance Use Topics   • Alcohol use: No   • Drug use: No     Social History     Social History Narrative   • Not on file       Family History   Problem Relation Age  "of Onset   • Hypertension Mother    • Hyperlipidemia Father        Allergies, past medical history, past surgical history, family history, social history reviewed and updated.    Review of Systems:     - Constitutional: Negative for fever, chills, unexpected weight change, and fatigue/generalized weakness.     - Respiratory: Negative for cough, sputum production, chest congestion, dyspnea, wheezing, and crackles.      - Cardiovascular: Negative for chest pain, palpitations, orthopnea, and bilateral lower extremity edema.     - Genitourinary: vaginal pruritis and abnormal white  discharge. Negative for dysuria, polyuria, hematuria, urinary urgency, and urinary incontinence.    - Psychiatric/Behavioral: Negative for depression, suicidal/homicidal ideation and memory loss.      All other systems reviewed and are negative    Exam:    /74 (BP Location: Right arm, Patient Position: Sitting, BP Cuff Size: Adult)   Pulse 95   Temp 36.4 °C (97.6 °F) (Temporal)   Resp 20   Ht 1.778 m (5' 10\")   Wt 81.7 kg (180 lb 3.2 oz)   SpO2 95%   BMI 25.86 kg/m²  Body mass index is 25.86 kg/m².    Physical Exam:  Constitutional: Well-developed and well-nourished. Not diaphoretic. No distress.   Cardiovascular: Regular rate and rhythm, S1 and S2 without murmur, rubs, or gallops.    Chest: Effort normal. Clear to auscultation throughout. No adventitious sounds. No CVA tenderness.  Abdomen: Soft, non tender, and without distention.No rebound, guarding, masses or HSM.  : Negative for dysuria, polyuria, hematuria, pyuria, urinary urgency, and urinary incontinence.  Neurological: Alert and oriented x 3.   Psychiatric:  Behavior, mood, and affect are appropriate.  MA/nursing note and vitals reviewed.    Patient was seen for 15 minutes face to face of which > 50% of appointment time was spent on counseling and coordination of care regarding the above.     Assessment/Plan:  1. Vaginal pruritus  2. Vaginal candidiasis  Uncontrolled. " will  Treat with antifungal PO Diflucan. Will review DNA panel to r/o Tricomoniasis. Discussed UTI prevention strategies.  Avoid damp or tight clothing, avoid scented detergents, recommend wearing underwear with cotton lining. Increase hydration ( at least 6-8 glasses of water per day). Consume fermented foods (ex. greek yogurt, kefir, fermented veggies.), add OTC vit C and non-sweetened cranberry juice and/or PO cranberry supplement daily.  Also recommend OTC supplement D-Mannose.  Avoid simple carbohydrates, maintain good hygiene post voidal / post BM wiping technique ( front to back). If diabetic, strive to achieve goal A1C.  If sexually active, drink 1 to 2 glasses of water prior to coitus and void right after coitus.   - POCT Urinalysis  - VAGINAL PATHOGENS DNA PANEL; Future      Discussed with patient possible alternative diagnoses, pt is to take all medications as prescribed. If symptoms persist FU w/PCP, if symptoms worsen go to emergency room. If experiencing any side effects from prescribed medications reports to the office immediately or go to emergency room.  Reviewed indication, dosage, usage and potential adverse effects of prescribed medications. Reviewed risks and benefits of treatment plan. Patient verbalizes understanding of all instruction and verbally agrees to plan.    Return if symptoms worsen or fail to improve.

## 2020-04-14 NOTE — ASSESSMENT & PLAN NOTE
New problem. Vaginal pruritis and abnormal white vaginal discharge. No other associated s/s. She denies fevers, flank, pelvic or mid back pain, no dysuria, frequency, urgency, no blood in urine. Has tried nothing OTC. Not a diabetic.   Unfortunately unable to run POC UA d/t strips being . Running vaginal pathogen DNA panel.

## 2020-04-15 ENCOUNTER — TELEPHONE (OUTPATIENT)
Dept: MEDICAL GROUP | Facility: PHYSICIAN GROUP | Age: 30
End: 2020-04-15

## 2020-04-15 LAB
CANDIDA DNA VAG QL PROBE+SIG AMP: POSITIVE
G VAGINALIS DNA VAG QL PROBE+SIG AMP: NEGATIVE
T VAGINALIS DNA VAG QL PROBE+SIG AMP: NEGATIVE

## 2020-04-15 NOTE — TELEPHONE ENCOUNTER
Called pt with results as promised. Left a message. Positive for Candida, pt was Rx'd appropriate antifungal medication. Prophylactic care discussed during initial encounter.

## 2020-04-21 ENCOUNTER — TELEPHONE (OUTPATIENT)
Dept: URGENT CARE | Facility: CLINIC | Age: 30
End: 2020-04-21

## 2020-04-21 NOTE — TELEPHONE ENCOUNTER
Patient is calling to see if she can get an antibiotic for a UTI. Patient is still having symptoms.

## 2020-04-22 ENCOUNTER — OFFICE VISIT (OUTPATIENT)
Dept: MEDICAL GROUP | Facility: PHYSICIAN GROUP | Age: 30
End: 2020-04-22
Payer: COMMERCIAL

## 2020-04-22 VITALS
SYSTOLIC BLOOD PRESSURE: 130 MMHG | HEIGHT: 58 IN | RESPIRATION RATE: 20 BRPM | WEIGHT: 178.2 LBS | TEMPERATURE: 97.8 F | OXYGEN SATURATION: 95 % | HEART RATE: 82 BPM | BODY MASS INDEX: 37.41 KG/M2 | DIASTOLIC BLOOD PRESSURE: 78 MMHG

## 2020-04-22 DIAGNOSIS — R80.8 OTHER PROTEINURIA: ICD-10-CM

## 2020-04-22 DIAGNOSIS — R81 GLUCOSURIA: ICD-10-CM

## 2020-04-22 DIAGNOSIS — R82.90 ABNORMAL FINDING ON URINALYSIS: ICD-10-CM

## 2020-04-22 DIAGNOSIS — B37.31 VAGINAL CANDIDIASIS: ICD-10-CM

## 2020-04-22 LAB
APPEARANCE UR: NORMAL
BILIRUB UR STRIP-MCNC: NEGATIVE MG/DL
COLOR UR AUTO: NORMAL
GLUCOSE UR STRIP.AUTO-MCNC: 500 MG/DL
KETONES UR STRIP.AUTO-MCNC: NEGATIVE MG/DL
LEUKOCYTE ESTERASE UR QL STRIP.AUTO: NEGATIVE
NITRITE UR QL STRIP.AUTO: NEGATIVE
PH UR STRIP.AUTO: 5.5 [PH] (ref 5–8)
PROT UR QL STRIP: 30 MG/DL
RBC UR QL AUTO: NEGATIVE
SP GR UR STRIP.AUTO: >=1.03
UROBILINOGEN UR STRIP-MCNC: 0.2 MG/DL

## 2020-04-22 PROCEDURE — 99214 OFFICE O/P EST MOD 30 MIN: CPT | Performed by: NURSE PRACTITIONER

## 2020-04-22 ASSESSMENT — FIBROSIS 4 INDEX: FIB4 SCORE: 0.41

## 2020-04-23 ENCOUNTER — TELEPHONE (OUTPATIENT)
Dept: URGENT CARE | Facility: CLINIC | Age: 30
End: 2020-04-23

## 2020-04-23 NOTE — ASSESSMENT & PLAN NOTE
New problem. Last week pt was seen and Tx'd for vaginal candidiasis. Her s/s included vaginal itching and moderate white vaginal thick discharge. After one time Fluconazone her sx significantly improved and are mild, however still persist and pt is conserved she may have a bladder infection. Her POC UA today is normal except for GLucosuria and proteinuria. Last available labs from 2019 w/ A1c 5/7. She denies frequency, urgency, dysuria, hematuria, fevers, no chills, no abd/pelvic/ flank tenderness. She does consume high carbohydrate diet and juices. Hx of gestational DM, BP is normal. Elevated BMI 37.24   Ref. Range 4/22/2020 11:18   POC Color Latest Ref Range: Negative  Dark Yellow   POC Appearance Latest Ref Range: Negative  Slightly Cloudy   POC Specific Gravity Latest Ref Range: <1.005 - >1.030  >=1.030   POC Urine PH Latest Ref Range: 5.0 - 8.0  5.5   POC Glucose Latest Ref Range: Negative mg/dL 500   POC Ketones Latest Ref Range: Negative mg/dL Negative   POC Protein Latest Ref Range: Negative mg/dL 30   POC Nitrites Latest Ref Range: Negative  Negative   POC Leukocyte Esterase Latest Ref Range: Negative  Negative   POC Blood Latest Ref Range: Negative  negative   POC Bilirubin Latest Ref Range: Negative mg/dL Negative   POC Urobiligen Latest Ref Range: Negative (0.2) mg/dL 0.2

## 2020-04-23 NOTE — PROGRESS NOTES
Chief Complaint   Patient presents with   • UTI     x1 week        HISTORY OF PRESENT ILLNESS: Patient is a 29 y.o. female, established patient who presents today to discuss medical problems as listed below:    Health Maintenance:  COMPLETED.    Abnormal finding on urinalysis  New problem. Last week pt was seen and Tx'd for vaginal candidiasis. Her s/s included vaginal itching and moderate white vaginal thick discharge. After one time Fluconazone her sx significantly improved and are mild, however still persist and pt is conserved she may have a bladder infection. Her POC UA today is normal except for GLucosuria and proteinuria. Last available labs from 2019 w/ A1c . She denies frequency, urgency, dysuria, hematuria, fevers, no chills, no abd/pelvic/ flank tenderness. She does consume high carbohydrate diet and juices. Hx of gestational DM, BP is normal. Elevated BMI 37.24   Ref. Range 2020 11:18   POC Color Latest Ref Range: Negative  Dark Yellow   POC Appearance Latest Ref Range: Negative  Slightly Cloudy   POC Specific Gravity Latest Ref Range: <1.005 - >1.030  >=1.030   POC Urine PH Latest Ref Range: 5.0 - 8.0  5.5   POC Glucose Latest Ref Range: Negative mg/dL 500   POC Ketones Latest Ref Range: Negative mg/dL Negative   POC Protein Latest Ref Range: Negative mg/dL 30   POC Nitrites Latest Ref Range: Negative  Negative   POC Leukocyte Esterase Latest Ref Range: Negative  Negative   POC Blood Latest Ref Range: Negative  negative   POC Bilirubin Latest Ref Range: Negative mg/dL Negative   POC Urobiligen Latest Ref Range: Negative (0.2) mg/dL 0.2       Patient Active Problem List    Diagnosis Date Noted   • Glucosuria 2020   • Abnormal finding on urinalysis 2020   • Other proteinuria 2020   • Vaginal candidiasis 2020   • Vitamin D deficiency 2019   • High blood triglycerides 2019   • Serum calcium decreased 2019   • Postpartum care following  delivery  08/09/2019   • Modified White class C pregestational diabetes mellitus 02/12/2019        Allergies: Patient has no known allergies.    Current Outpatient Medications   Medication Sig Dispense Refill   • miconazole (MONISTAT-7) 100 MG Suppos Insert 1 Suppository in vagina every evening. 7 Suppository 1   • D-Mannose 350 MG Cap Take 1 Capsule by mouth 2 Times a Day. 60 Cap 0   • Prenatal MV-Min-Fe Fum-FA-DHA (PRENATAL 1 PO) Take  by mouth.     • vitamin D, Ergocalciferol, (DRISDOL) 70833 units Cap capsule Take 1 Cap by mouth every 7 days. (Patient not taking: Reported on 9/26/2019) 12 Cap 0   • ibuprofen (MOTRIN) 800 MG Tab Take 1 Tab by mouth every 8 hours as needed (For cramping after delivery; do not give if patient is receiving ketorolac (Toradol)). (Patient not taking: Reported on 9/26/2019) 30 Tab 2     No current facility-administered medications for this visit.        Social History     Tobacco Use   • Smoking status: Never Smoker   • Smokeless tobacco: Never Used   Substance Use Topics   • Alcohol use: No   • Drug use: No     Social History     Social History Narrative   • Not on file       Family History   Problem Relation Age of Onset   • Hypertension Mother    • Hyperlipidemia Father        Allergies, past medical history, past surgical history, family history, social history reviewed and updated.    Review of Systems:     - Constitutional: Negative for fever, chills, unexpected weight change, and fatigue/generalized weakness.     - Respiratory: Negative for cough, sputum production, chest congestion, dyspnea, wheezing, and crackles.      - Cardiovascular: Negative for chest pain, palpitations, orthopnea, and bilateral lower extremity edema.     - Genitourinary: vaginal pruritus and thick white vaginal discharge. Negative for dysuria, polyuria, hematuria, pyuria, urinary urgency, and urinary incontinence.    - Psychiatric/Behavioral: Negative for depression, suicidal/homicidal ideation and memory loss.   "    All other systems reviewed and are negative    Exam:    /78 (BP Location: Right arm, Patient Position: Sitting, BP Cuff Size: Adult)   Pulse 82   Temp 36.6 °C (97.8 °F) (Temporal)   Resp 20   Ht 1.473 m (4' 10\")   Wt 80.8 kg (178 lb 3.2 oz)   SpO2 95%   BMI 37.24 kg/m²  Body mass index is 37.24 kg/m².    Physical Exam:  Constitutional: Well-developed and well-nourished. Not diaphoretic. No distress.   Cardiovascular: Regular rate and rhythm, S1 and S2 without murmur, rubs, or gallops.    Chest: Effort normal. Clear to auscultation throughout. No adventitious sounds. No CVA tenderness.  Abdomen: Soft, non tender, and without distention. Active bowel sounds in all four quadrants. No rebound, guarding, masses or HSM.  : Negative for dysuria, polyuria, hematuria, pyuria, urinary urgency, and urinary incontinence.  Neurological: Alert and oriented x 3.   Psychiatric:  Behavior, mood, and affect are appropriate.  MA/nursing note and vitals reviewed.    LABS: 2019  results reviewed and discussed with the patient, questions answered.    Patient was seen for 25 minutes face to face of which > 50% of appointment time was spent on counseling and coordination of care regarding the above.     Assessment/Plan:  1. Vaginal candidiasis  Stable, improving. Given abnormal findings in UA today, will evaluate for underlying DM with known Hx of gestational DM, will check kidney function.  - miconazole (MONISTAT-7) 100 MG Suppos; Insert 1 Suppository in vagina every evening.  Dispense: 7 Suppository; Refill: 1  - D-Mannose 350 MG Cap; Take 1 Capsule by mouth 2 Times a Day.  Dispense: 60 Cap; Refill: 0    2. Glucosuria   - A1C, Future   - CMP, Future    3. Abnormal finding on urinalysis  POC UA positive for glucose and protein.    4. Other proteinuria   - CMP, Future    5. BMI 37.0-37.9, adult   - TSH, Future   - Free thyroxine, Future   - Lipid panel    Discussed with patient possible alternative diagnoses, pt is to " take all medications as prescribed. If symptoms persist FU w/PCP, if symptoms worsen go to emergency room. If experiencing any side effects from prescribed medications reports to the office immediately or go to emergency room.  Reviewed indication, dosage, usage and potential adverse effects of prescribed medications. Reviewed risks and benefits of treatment plan. Patient verbalizes understanding of all instruction and verbally agrees to plan.    Return if symptoms worsen or fail to improve.

## 2020-04-24 NOTE — TELEPHONE ENCOUNTER
VOICEMAIL:    Patient is calling about prescriptions. If they were sent to her pharmacy.      I called her back and left voicemail to call here to let her know that the prescriptions were sent to [facility] pharmacy.

## 2020-06-01 ENCOUNTER — OFFICE VISIT (OUTPATIENT)
Dept: URGENT CARE | Facility: CLINIC | Age: 30
End: 2020-06-01
Payer: COMMERCIAL

## 2020-06-01 VITALS
WEIGHT: 173 LBS | TEMPERATURE: 97.6 F | OXYGEN SATURATION: 95 % | HEIGHT: 58 IN | DIASTOLIC BLOOD PRESSURE: 94 MMHG | BODY MASS INDEX: 36.31 KG/M2 | HEART RATE: 94 BPM | SYSTOLIC BLOOD PRESSURE: 138 MMHG | RESPIRATION RATE: 14 BRPM

## 2020-06-01 DIAGNOSIS — H00.024 HORDEOLUM INTERNUM OF LEFT UPPER EYELID: ICD-10-CM

## 2020-06-01 PROCEDURE — 99214 OFFICE O/P EST MOD 30 MIN: CPT | Performed by: PHYSICIAN ASSISTANT

## 2020-06-01 RX ORDER — SULFAMETHOXAZOLE AND TRIMETHOPRIM 800; 160 MG/1; MG/1
1 TABLET ORAL 2 TIMES DAILY
Qty: 14 TAB | Refills: 0 | Status: SHIPPED | OUTPATIENT
Start: 2020-06-01 | End: 2020-06-08

## 2020-06-01 RX ORDER — ERYTHROMYCIN 5 MG/G
OINTMENT OPHTHALMIC
Qty: 1 TUBE | Refills: 0 | Status: SHIPPED | OUTPATIENT
Start: 2020-06-01 | End: 2021-01-08

## 2020-06-01 ASSESSMENT — ENCOUNTER SYMPTOMS
EYE REDNESS: 1
BLURRED VISION: 0
VOMITING: 0
CHILLS: 0
EYE PAIN: 1
BRUISES/BLEEDS EASILY: 0
DIZZINESS: 0
SHORTNESS OF BREATH: 0
EYE DISCHARGE: 1
DOUBLE VISION: 0
ABDOMINAL PAIN: 0
NAUSEA: 0
HEADACHES: 0
FEVER: 0
DIARRHEA: 0
COUGH: 0
PHOTOPHOBIA: 0
MYALGIAS: 0

## 2020-06-01 ASSESSMENT — VISUAL ACUITY: OU: 1

## 2020-06-01 ASSESSMENT — FIBROSIS 4 INDEX: FIB4 SCORE: 0.41

## 2020-06-01 NOTE — LETTER
June 1, 2020         Patient: Angelina Jarquin   YOB: 1990   Date of Visit: 6/1/2020           To Whom it May Concern:    Angelina Jarquin was seen in my clinic on 6/1/2020.     If you have any questions or concerns, please don't hesitate to call.        Sincerely,           Hanane cD P.A.-C.  Electronically Signed

## 2020-06-01 NOTE — PROGRESS NOTES
Subjective:      Angelina Jarquin is a 29 y.o. female who presents with Eye Problem (left eye swelling)    HPI:  This is a new problem. Angelina Jarquin is a 29 y.o. female who presents today for evaluation of left eye swelling.  Patient states that she started to notice some very minimal pain on Saturday.  Says that she woke up yesterday morning and her eye was very swollen.  Says it has been gradually worsening.  Is mainly left upper eyelid and there is pain on the lateral aspect of the left upper eyelid.  She is also had some sticky white discharge from the eye.  No visual changes.  No pain with eye movement.  No fever/chills.  No recent URI.  Patient does not wear contact lenses.  She does occasionally wear eye make-up but has not worn any since Friday.  States she tried applying some warm chamomile but it provided no relief.        Review of Systems   Constitutional: Negative for chills, fever and malaise/fatigue.   HENT: Negative for congestion.    Eyes: Positive for pain, discharge and redness. Negative for blurred vision, double vision and photophobia.   Respiratory: Negative for cough and shortness of breath.    Cardiovascular: Negative for chest pain.   Gastrointestinal: Negative for abdominal pain, diarrhea, nausea and vomiting.   Musculoskeletal: Negative for myalgias.   Skin: Negative for rash.   Neurological: Negative for dizziness and headaches.   Endo/Heme/Allergies: Does not bruise/bleed easily.       PMH:  has a past medical history of Diabetes (HCC) and Hypertension.  MEDS:   Current Outpatient Medications:   •  sulfamethoxazole-trimethoprim (BACTRIM DS) 800-160 MG tablet, Take 1 Tab by mouth 2 times a day for 7 days., Disp: 14 Tab, Rfl: 0  •  erythromycin 5 MG/GM Ointment, Instill ~1 cm ribbon into affected eye(s) up to 6 times daily, Disp: 1 Tube, Rfl: 0  •  miconazole (MONISTAT-7) 100 MG Suppos, Insert 1 Suppository in vagina every evening., Disp: 7 Suppository, Rfl: 1  •  D-Mannose 350  "MG Cap, Take 1 Capsule by mouth 2 Times a Day., Disp: 60 Cap, Rfl: 0  •  Prenatal MV-Min-Fe Fum-FA-DHA (PRENATAL 1 PO), Take  by mouth., Disp: , Rfl:   ALLERGIES: No Known Allergies  SURGHX:   Past Surgical History:   Procedure Laterality Date   • REPEAT C SECTOIN WITH SALPINGECTOMY Bilateral 2019    Procedure:  SECTION, REPEAT, WITH SALPINGECTOMY;  Surgeon: Lexis Linton D.O.;  Location: LABOR AND DELIVERY;  Service: Obstetrics   • PRIMARY C SECTION  2017    Procedure: PRIMARY C SECTION;  Surgeon: Goran Webb M.D.;  Location: LABOR AND DELIVERY;  Service: Gynecology     SOCHX:  reports that she has never smoked. She has never used smokeless tobacco. She reports that she does not drink alcohol or use drugs.  FH: Family history was reviewed, no pertinent findings to report     Objective:     /94 (BP Location: Right arm, Patient Position: Sitting)   Pulse 94   Temp 36.4 °C (97.6 °F)   Resp 14   Ht 1.473 m (4' 10\")   Wt 78.5 kg (173 lb)   SpO2 95%   BMI 36.16 kg/m²      Physical Exam  Constitutional:       Appearance: She is well-developed.   HENT:      Head: Normocephalic and atraumatic.      Right Ear: External ear normal.      Left Ear: External ear normal.   Eyes:      General: Lids are everted, no foreign bodies appreciated. Vision grossly intact.         Left eye: Discharge and hordeolum present.No foreign body.      Extraocular Movements: Extraocular movements intact.      Conjunctiva/sclera: Conjunctivae normal.      Left eye: Left conjunctiva is not injected.      Pupils: Pupils are equal, round, and reactive to light.      Comments: Left upper eyelid with erythema and swelling.  Tenderness palpation and hordeolum noted on the lateral aspect of the inner eye lid.  Swelling extends mildly into the inferior eyelid but there is no redness or tenderness to palpation inferiorly.   Neck:      Musculoskeletal: Normal range of motion.   Cardiovascular:      Rate and Rhythm: Normal " rate and regular rhythm.      Heart sounds: Normal heart sounds. No murmur.   Pulmonary:      Effort: Pulmonary effort is normal.      Breath sounds: Normal breath sounds. No wheezing.   Lymphadenopathy:      Cervical: No cervical adenopathy.   Skin:     General: Skin is warm and dry.      Capillary Refill: Capillary refill takes less than 2 seconds.   Neurological:      Mental Status: She is alert and oriented to person, place, and time.   Psychiatric:         Behavior: Behavior normal.         Judgment: Judgment normal.            Assessment/Plan:     1. Hordeolum internum of left upper eyelid  - sulfamethoxazole-trimethoprim (BACTRIM DS) 800-160 MG tablet; Take 1 Tab by mouth 2 times a day for 7 days.  Dispense: 14 Tab; Refill: 0  - erythromycin 5 MG/GM Ointment; Instill ~1 cm ribbon into affected eye(s) up to 6 times daily  Dispense: 1 Tube; Refill: 0    *Physical exam consistent with hordeolum of left upper eyelid.  Due to the extent of the pain/swelling, however, opted to place the patient on oral antibiotics as well as prescribed erythromycin ointment.  Patient was advised to do warm compresses multiple times per day.  If no improvement after 48 hours or if symptoms worsen at all she will need to follow-up with her eye doctor.  Patient understands and agrees with the plan.            Differential Diagnosis, natural history, and supportive care discussed. Return to the Urgent Care or follow up with your PCP if symptoms fail to resolve, or for any new or worsening symptoms. Emergency room precautions discussed. Patient and/or family appears understanding of information.

## 2020-09-20 NOTE — PROGRESS NOTES
[facility].  She was provided an Accucheck Verio meter and 20 test strips.  She was told about the Relion meter at [facility] and about [facility].  The education letter is under the letters tab and the meal plan is scanned into Media.   No

## 2021-01-08 ENCOUNTER — OFFICE VISIT (OUTPATIENT)
Dept: MEDICAL GROUP | Facility: PHYSICIAN GROUP | Age: 31
End: 2021-01-08
Payer: COMMERCIAL

## 2021-01-08 VITALS
WEIGHT: 168 LBS | HEART RATE: 96 BPM | DIASTOLIC BLOOD PRESSURE: 82 MMHG | TEMPERATURE: 98.8 F | OXYGEN SATURATION: 95 % | RESPIRATION RATE: 18 BRPM | SYSTOLIC BLOOD PRESSURE: 130 MMHG | BODY MASS INDEX: 35.26 KG/M2 | HEIGHT: 58 IN

## 2021-01-08 DIAGNOSIS — R21 SKIN RASH: ICD-10-CM

## 2021-01-08 PROCEDURE — 99213 OFFICE O/P EST LOW 20 MIN: CPT | Performed by: NURSE PRACTITIONER

## 2021-01-08 RX ORDER — PREDNISONE 10 MG/1
10 TABLET ORAL DAILY
Qty: 7 TAB | Refills: 0 | Status: SHIPPED | OUTPATIENT
Start: 2021-01-08 | End: 2021-01-15

## 2021-01-08 RX ORDER — TRIAMCINOLONE ACETONIDE 1 MG/G
1 CREAM TOPICAL 2 TIMES DAILY
Qty: 15 G | Refills: 1 | Status: SHIPPED | OUTPATIENT
Start: 2021-01-08 | End: 2023-03-17

## 2021-01-08 ASSESSMENT — FIBROSIS 4 INDEX: FIB4 SCORE: 0.42

## 2021-01-08 ASSESSMENT — PATIENT HEALTH QUESTIONNAIRE - PHQ9: CLINICAL INTERPRETATION OF PHQ2 SCORE: 0

## 2021-01-08 NOTE — PROGRESS NOTES
Chief Complaint   Patient presents with   • Rash     rash on glute, moved on hips    • Vaginitis     happing all the time       HISTORY OF PRESENT ILLNESS: Patient is a 30 y.o. female, established patient who presents today to discuss medical problems as listed below:    Health Maintenance:  COMPLETED     Skin rash  New problem. Skin bumps noted 3 wks ago. Generalized bumps on both glutes, spreading around lateral right leg. Non painful, however tender. Associated symptoms are itching. Denies systemic symptoms such as fever, CP, SOB, nausea, skin discoloration. Feels problem getting worse. Denies new hygiene products, detergents or soaps. Tried nothing OTC.        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    Current Outpatient Medications   Medication Sig Dispense Refill   • triamcinolone acetonide (KENALOG) 0.1 % Cream Apply 1 Application topically 2 times a day. 15 g 1   • predniSONE (DELTASONE) 10 MG Tab Take 1 Tab by mouth every day for 7 days. 7 Tab 0   • miconazole (MONISTAT-7) 100 MG Suppos Insert 1 Suppository in vagina every evening. 7 Suppository 1   • Prenatal MV-Min-Fe Fum-FA-DHA (PRENATAL 1 PO) Take  by mouth.       No current facility-administered medications for this visit.        Social History     Tobacco Use   • Smoking status: Never Smoker   • Smokeless tobacco: Never Used   Substance Use Topics   • Alcohol use: No   • Drug use: No     Social History     Social History Narrative   • Not on file       Family History   Problem Relation  "Age of Onset   • Hypertension Mother    • Hyperlipidemia Father        Allergies, past medical history, past surgical history, family history, social history reviewed and updated.    Review of Systems:     - Constitutional: Negative for fever, chills, unexpected weight change, and fatigue/generalized weakness.     - Respiratory: Negative for cough, sputum production, chest congestion, dyspnea, wheezing, and crackles.      - Cardiovascular: Negative for chest pain, palpitations, orthopnea, and bilateral lower extremity edema.     - Skin: Skin rash on bilateral glutes.   - Psychiatric/Behavioral: Negative for depression, suicidal/homicidal ideation and memory loss.      All other systems reviewed and are negative    Exam:    /82 (BP Location: Left arm, Patient Position: Sitting, BP Cuff Size: Adult)   Pulse 96   Temp 37.1 °C (98.8 °F) (Temporal)   Resp 18   Ht 1.473 m (4' 10\")   Wt 76.2 kg (168 lb)   LMP  (LMP Unknown)   SpO2 95%   BMI 35.11 kg/m²  Body mass index is 35.11 kg/m².    Physical Exam:  Constitutional: Well-developed and well-nourished. Not diaphoretic. No distress.   Skin: non bleeding skin rash evenly distributed on b/l glutes, no edema or discharge noted.    Cardiovascular: Regular rate and rhythm, S1 and S2 without murmur, rubs, or gallops.    Chest: Effort normal. Clear to auscultation throughout. No adventitious sounds.   Neurological: Alert and oriented x 3.   Psychiatric:  Behavior, mood, and affect are appropriate.  MA/nursing note and vitals reviewed.    Patient was seen for 15 minutes face to face of which > 50% of appointment time was spent on counseling and coordination of care regarding the above.     Assessment/Plan:  1. Skin rash  Uncontrolled, stable.  Suspecting allergic dermatitis. Will treat with kenalog topical and PO prednisone low dose x 7 days.  Follow-up in 1 week.  - triamcinolone acetonide (KENALOG) 0.1 % Cream; Apply 1 Application topically 2 times a day.  " Dispense: 15 g; Refill: 1  - predniSONE (DELTASONE) 10 MG Tab; Take 1 Tab by mouth every day for 7 days.  Dispense: 7 Tab; Refill: 0       Discussed with patient possible alternative diagnoses, pt is to take all medications as prescribed. If symptoms persist FU w/PCP, if symptoms worsen go to emergency room. If experiencing any side effects from prescribed medications reports to the office immediately or go to emergency room.  Reviewed indication, dosage, usage and potential adverse effects of prescribed medications. Reviewed risks and benefits of treatment plan. Patient verbalizes understanding of all instruction and verbally agrees to plan.    No follow-ups on file.

## 2021-01-08 NOTE — ASSESSMENT & PLAN NOTE
New problem. Skin bumps noted 3 wks ago. Generalized bumps on both glutes, spreading around lateral right leg. Non painful, however tender. Associated symptoms are itching. Denies systemic symptoms such as fever, CP, SOB, nausea, skin discoloration. Feels problem getting worse. Denies new hygiene products, detergents or soaps. Tried nothing OTC.

## 2021-09-16 NOTE — OR SURGEON
Immediate Post OP Note    PreOp Diagnosis: 39w1d IUP, history of previous  section    PostOp Diagnosis: same    Procedure(s):   SECTION, REPEAT    Surgeon(s):  Lexis Linton D.O.    Anesthesiologist/Type of Anesthesia:  Anesthesiologist: Destiny Salgado M.D./* No anesthesia type entered *    Surgical Staff:  * No surgical staff found *    Specimens removed if any:  * No specimens in log *    Estimated Blood Loss: 500cc    Urine: 300cc    Findings: normal appearing uterus, bilateral fallopian tubes, and ovaries. Viable female fetus.     Complications: none        2019 10:54 AM Lexis Linton D.O.      
with patient

## 2022-03-28 ENCOUNTER — OFFICE VISIT (OUTPATIENT)
Dept: MEDICAL GROUP | Facility: PHYSICIAN GROUP | Age: 32
End: 2022-03-28
Payer: COMMERCIAL

## 2022-03-28 VITALS
HEART RATE: 112 BPM | SYSTOLIC BLOOD PRESSURE: 124 MMHG | RESPIRATION RATE: 16 BRPM | DIASTOLIC BLOOD PRESSURE: 80 MMHG | TEMPERATURE: 97.9 F | HEIGHT: 58 IN | WEIGHT: 161.82 LBS | BODY MASS INDEX: 33.97 KG/M2 | OXYGEN SATURATION: 96 %

## 2022-03-28 DIAGNOSIS — L02.419 THIGH ABSCESS: ICD-10-CM

## 2022-03-28 PROCEDURE — 99213 OFFICE O/P EST LOW 20 MIN: CPT | Performed by: STUDENT IN AN ORGANIZED HEALTH CARE EDUCATION/TRAINING PROGRAM

## 2022-03-28 RX ORDER — SULFAMETHOXAZOLE AND TRIMETHOPRIM 800; 160 MG/1; MG/1
1 TABLET ORAL 2 TIMES DAILY
Qty: 14 TABLET | Refills: 0 | Status: SHIPPED | OUTPATIENT
Start: 2022-03-28 | End: 2022-04-04

## 2022-03-28 ASSESSMENT — ENCOUNTER SYMPTOMS: FEVER: 0

## 2022-03-28 NOTE — PROGRESS NOTES
"  HISTORY OF PRESENT ILLNESS: Angelina is a pleasant 31 y.o. female, established patient who presents today to discuss medical problems as listed below:    Problem   Thigh Abscess    Patient reports a right-sided inner thigh abscess that started about 2 days ago.  It has been getting increasing pain and erythema and swelling since then.  They tried to \"pop it\" yesterday with no success.  No significant discharge, no fevers.           Current Outpatient Medications Ordered in Epic   Medication Sig Dispense Refill   • sulfamethoxazole-trimethoprim (BACTRIM DS) 800-160 MG tablet Take 1 Tablet by mouth 2 times a day for 7 days. 14 Tablet 0   • triamcinolone acetonide (KENALOG) 0.1 % Cream Apply 1 Application topically 2 times a day. (Patient not taking: Reported on 3/28/2022) 15 g 1   • miconazole (MONISTAT-7) 100 MG Suppos Insert 1 Suppository in vagina every evening. (Patient not taking: Reported on 3/28/2022) 7 Suppository 1   • Prenatal MV-Min-Fe Fum-FA-DHA (PRENATAL 1 PO) Take  by mouth. (Patient not taking: Reported on 3/28/2022)       No current [facility]-ordered facility-administered medications on file.       Review of Systems   Constitutional: Negative for fever.        Past Medical History:   Diagnosis Date   • Diabetes (HCC)     gestational diabetes   • Hypertension     during pregnanacy only     Past Surgical History:   Procedure Laterality Date   • REPEAT C SECTOIN WITH SALPINGECTOMY Bilateral 2019    Procedure:  SECTION, REPEAT, WITH SALPINGECTOMY;  Surgeon: Lexis Linton D.O.;  Location: LABOR AND DELIVERY;  Service: Obstetrics   • PRIMARY C SECTION  2017    Procedure: PRIMARY C SECTION;  Surgeon: Goran Webb M.D.;  Location: LABOR AND DELIVERY;  Service: Gynecology     Social History     Tobacco Use   • Smoking status: Never Smoker   • Smokeless tobacco: Never Used   Vaping Use   • Vaping Use: Never used   Substance Use Topics   • Alcohol use: No   • Drug use: No      Family History " "  Problem Relation Age of Onset   • Hypertension Mother    • Hyperlipidemia Father      Current Outpatient Medications   Medication Sig Dispense Refill   • sulfamethoxazole-trimethoprim (BACTRIM DS) 800-160 MG tablet Take 1 Tablet by mouth 2 times a day for 7 days. 14 Tablet 0   • triamcinolone acetonide (KENALOG) 0.1 % Cream Apply 1 Application topically 2 times a day. (Patient not taking: Reported on 3/28/2022) 15 g 1   • miconazole (MONISTAT-7) 100 MG Suppos Insert 1 Suppository in vagina every evening. (Patient not taking: Reported on 3/28/2022) 7 Suppository 1   • Prenatal MV-Min-Fe Fum-FA-DHA (PRENATAL 1 PO) Take  by mouth. (Patient not taking: Reported on 3/28/2022)       No current facility-administered medications for this visit.       Allergies:  No Known Allergies    Allergies, past medical history, past surgical history, family history, social history reviewed and updated.    Objective:    /80   Pulse (!) 112   Temp 36.6 °C (97.9 °F) (Temporal)   Resp 16   Ht 1.473 m (4' 10\")   Wt 73.4 kg (161 lb 13.1 oz)   SpO2 96%   BMI 33.82 kg/m²    Body mass index is 33.82 kg/m².    Physical Exam  Constitutional:       Appearance: Normal appearance. She is obese. She is not ill-appearing or diaphoretic.   Skin:     Findings: Lesion present.      Comments: 5 cm diameter cyst medial thigh right leg.  Significant erythema and induration extending into the surrounding tissues.  Tender to palpation   Neurological:      Mental Status: She is alert.            Assessment/Plan:    Problem List Items Addressed This Visit     Thigh abscess     Patient having about a 5 cm abscess underneath the skin.  There is significant erythema and induration of the surrounding tissues.  She is requiring an incision and drainage, will send to the ER for the procedure as she will be needing pain medication.  Will write a letter for work for 5 days as she is having difficulty ambulating.    Return to care as needed.            "     Return if symptoms worsen or fail to improve.

## 2022-03-28 NOTE — LETTER
March 28, 2022    To Whom It May Concern:         This is confirmation that Angelina Mirandaes attended her scheduled appointment with Aristides Michaels D.O. on 3/28/22.    Robyn Jarquin is currently under my car. She is having a medical issue that she will to take off work for the next 5 days.          If you have any questions please do not hesitate to call me at the phone number listed below.    Sincerely,          Aristides Michaels D.O.  [phone]

## 2022-03-28 NOTE — ASSESSMENT & PLAN NOTE
Patient having about a 5 cm abscess underneath the skin.  There is significant erythema and induration of the surrounding tissues.  She is requiring an incision and drainage, will send to the ER for the procedure as she will be needing pain medication.  Will write a letter for work for 5 days as she is having difficulty ambulating.    Return to care as needed.

## 2022-05-09 ENCOUNTER — HOSPITAL ENCOUNTER (OUTPATIENT)
Facility: MEDICAL CENTER | Age: 32
End: 2022-05-09
Attending: PHYSICIAN ASSISTANT
Payer: COMMERCIAL

## 2022-05-09 ENCOUNTER — OFFICE VISIT (OUTPATIENT)
Dept: MEDICAL GROUP | Facility: PHYSICIAN GROUP | Age: 32
End: 2022-05-09
Payer: COMMERCIAL

## 2022-05-09 VITALS
OXYGEN SATURATION: 97 % | BODY MASS INDEX: 34.43 KG/M2 | SYSTOLIC BLOOD PRESSURE: 128 MMHG | HEART RATE: 100 BPM | DIASTOLIC BLOOD PRESSURE: 86 MMHG | HEIGHT: 58 IN | WEIGHT: 164 LBS | TEMPERATURE: 97.4 F | RESPIRATION RATE: 14 BRPM

## 2022-05-09 DIAGNOSIS — N89.8 VAGINAL DISCHARGE: ICD-10-CM

## 2022-05-09 LAB
APPEARANCE UR: CLEAR
BILIRUB UR STRIP-MCNC: NEGATIVE MG/DL
COLOR UR AUTO: NORMAL
GLUCOSE UR STRIP.AUTO-MCNC: NORMAL MG/DL
KETONES UR STRIP.AUTO-MCNC: NEGATIVE MG/DL
LEUKOCYTE ESTERASE UR QL STRIP.AUTO: NEGATIVE
NITRITE UR QL STRIP.AUTO: NEGATIVE
PH UR STRIP.AUTO: 5.5 [PH] (ref 5–8)
PROT UR QL STRIP: NEGATIVE MG/DL
RBC UR QL AUTO: NEGATIVE
SP GR UR STRIP.AUTO: 1.01
UROBILINOGEN UR STRIP-MCNC: NORMAL MG/DL

## 2022-05-09 PROCEDURE — 87510 GARDNER VAG DNA DIR PROBE: CPT

## 2022-05-09 PROCEDURE — 81002 URINALYSIS NONAUTO W/O SCOPE: CPT | Performed by: PHYSICIAN ASSISTANT

## 2022-05-09 PROCEDURE — 87480 CANDIDA DNA DIR PROBE: CPT

## 2022-05-09 PROCEDURE — 99213 OFFICE O/P EST LOW 20 MIN: CPT | Performed by: PHYSICIAN ASSISTANT

## 2022-05-09 PROCEDURE — 87660 TRICHOMONAS VAGIN DIR PROBE: CPT

## 2022-05-09 RX ORDER — DOXYCYCLINE 100 MG/1
CAPSULE ORAL
COMMUNITY
Start: 2022-03-28 | End: 2022-05-09

## 2022-05-09 RX ORDER — COVID-19 ANTIGEN TEST
KIT MISCELLANEOUS
COMMUNITY
Start: 2022-02-18 | End: 2023-03-17

## 2022-05-09 RX ORDER — FLUCONAZOLE 150 MG/1
TABLET ORAL
COMMUNITY
Start: 2022-03-31 | End: 2022-05-09

## 2022-05-09 RX ORDER — FLUCONAZOLE 150 MG/1
150 TABLET ORAL DAILY
Qty: 2 TABLET | Refills: 0 | Status: SHIPPED | OUTPATIENT
Start: 2022-05-09 | End: 2022-05-10

## 2022-05-09 RX ORDER — CEPHALEXIN 500 MG/1
CAPSULE ORAL
COMMUNITY
Start: 2022-03-29 | End: 2023-03-17

## 2022-05-09 ASSESSMENT — PATIENT HEALTH QUESTIONNAIRE - PHQ9: CLINICAL INTERPRETATION OF PHQ2 SCORE: 0

## 2022-05-09 NOTE — PROGRESS NOTES
CC:  Chief Complaint   Patient presents with   • Vaginitis     X3wks        HISTORY OF PRESENT ILLNESS: Patient is a 31 y.o. female established patient presenting with white clumpy vaginal discharge for past 4 weeks. Was on abx for abscess a few weeks ago. Has hx of yeast infections previously and it feels just like it.       No problem-specific Assessment & Plan notes found for this encounter.      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    Current Outpatient Medications   Medication Sig Dispense Refill   • cephALEXin (KEFLEX) 500 MG Cap TAKE 1 CAPSULE BY MOUTH 4 TIMES DAILY FOR 7 DAYS (Patient not taking: Reported on 2022)     • FLOWFLEX COVID-19 AG HOME TEST Kit  (Patient not taking: Reported on 2022)     • doxycycline (MONODOX) 100 MG capsule TAKE 1 CAPSULE BY MOUTH TWICE DAILY FOR 7 DAYS (Patient not taking: Reported on 2022)     • fluconazole (DIFLUCAN) 150 MG tablet TAKE 1 TABLET BY MOUTH ONCE. IF SYMPTOMS ARE NOT RESOLVED AFTER 72 HOURS, TAKE 2ND DOSE. (Patient not taking: Reported on 2022)     • triamcinolone acetonide (KENALOG) 0.1 % Cream Apply 1 Application topically 2 times a day. (Patient not taking: No sig reported) 15 g 1   • miconazole (MONISTAT-7) 100 MG Suppos Insert 1 Suppository in vagina every evening. (Patient not taking: No sig reported) 7 Suppository 1   • Prenatal MV-Min-Fe Fum-FA-DHA (PRENATAL 1 PO) Take  by mouth. (Patient not taking: No sig reported)       No current  "facility-administered medications for this visit.       Social History     Tobacco Use   • Smoking status: Never Smoker   • Smokeless tobacco: Never Used   Vaping Use   • Vaping Use: Never used   Substance Use Topics   • Alcohol use: No   • Drug use: No     Social History     Social History Narrative   • Not on file       Family History   Problem Relation Age of Onset   • Hypertension Mother    • Hyperlipidemia Father         ROS:     - Constitutional:  Negative for fever, chills, unexpected weight change, and fatigue/generalized weakness.     - Genitourinary: Negative for dysuria, polyuria, hematuria, pyuria, urinary urgency, and urinary incontinence.        Exam:    /86   Pulse 100   Temp 36.3 °C (97.4 °F) (Temporal)   Resp 14   Ht 1.473 m (4' 10\")   Wt 74.4 kg (164 lb)   SpO2 97%  Body mass index is 34.28 kg/m².    General:  Well nourished, well developed female in NAD  Head is grossly normal.  Neck: Supple. Thyroid is not enlarged.  Skin: Warm and dry. No obvious lesions  Neuro: Normal muscle tone. Gait normal. Alert and oriented.  Psych: Normal mood and affect      Please note that this dictation was created using voice recognition software. I have made every reasonable attempt to correct obvious errors, but I expect that there are errors of grammar and possibly content that I did not discover before finalizing the note.        Assessment/Plan:    1. Vaginal discharge  Will treat with diflucan and get culture to confirm that it is yeast  - POCT Urinalysis  - fluconazole (DIFLUCAN) 150 MG tablet; Take 1 Tablet by mouth every day for 1 dose. May repeat dose in 72 hours if needed  Dispense: 2 Tablet; Refill: 0  - VAGINAL PATHOGENS DNA PANEL; Future         "

## 2022-05-10 DIAGNOSIS — N76.0 BV (BACTERIAL VAGINOSIS): ICD-10-CM

## 2022-05-10 DIAGNOSIS — B96.89 BV (BACTERIAL VAGINOSIS): ICD-10-CM

## 2022-05-10 LAB
CANDIDA DNA VAG QL PROBE+SIG AMP: POSITIVE
G VAGINALIS DNA VAG QL PROBE+SIG AMP: POSITIVE
T VAGINALIS DNA VAG QL PROBE+SIG AMP: NEGATIVE

## 2022-05-10 RX ORDER — METRONIDAZOLE 500 MG/1
500 TABLET ORAL 2 TIMES DAILY
Qty: 14 TABLET | Refills: 0 | Status: SHIPPED | OUTPATIENT
Start: 2022-05-10 | End: 2022-05-17

## 2023-03-17 ENCOUNTER — HOSPITAL ENCOUNTER (OUTPATIENT)
Facility: MEDICAL CENTER | Age: 33
End: 2023-03-17
Attending: NURSE PRACTITIONER
Payer: COMMERCIAL

## 2023-03-17 ENCOUNTER — OFFICE VISIT (OUTPATIENT)
Dept: MEDICAL GROUP | Facility: PHYSICIAN GROUP | Age: 33
End: 2023-03-17
Payer: COMMERCIAL

## 2023-03-17 VITALS
RESPIRATION RATE: 14 BRPM | TEMPERATURE: 97.1 F | HEIGHT: 58 IN | DIASTOLIC BLOOD PRESSURE: 80 MMHG | OXYGEN SATURATION: 97 % | WEIGHT: 156.2 LBS | BODY MASS INDEX: 32.79 KG/M2 | HEART RATE: 101 BPM | SYSTOLIC BLOOD PRESSURE: 122 MMHG

## 2023-03-17 DIAGNOSIS — N89.8 VAGINAL DISCHARGE: ICD-10-CM

## 2023-03-17 DIAGNOSIS — B37.31 VAGINAL CANDIDIASIS: ICD-10-CM

## 2023-03-17 DIAGNOSIS — N89.8 VAGINAL SORE: ICD-10-CM

## 2023-03-17 DIAGNOSIS — R73.03 PREDIABETES: ICD-10-CM

## 2023-03-17 DIAGNOSIS — R10.2 VULVAR PAIN: ICD-10-CM

## 2023-03-17 DIAGNOSIS — E78.1 HIGH BLOOD TRIGLYCERIDES: ICD-10-CM

## 2023-03-17 DIAGNOSIS — E55.9 VITAMIN D DEFICIENCY: ICD-10-CM

## 2023-03-17 DIAGNOSIS — E78.1 HIGH TRIGLYCERIDES: ICD-10-CM

## 2023-03-17 DIAGNOSIS — Z97.5 IUD (INTRAUTERINE DEVICE) IN PLACE: ICD-10-CM

## 2023-03-17 DIAGNOSIS — Z00.00 ANNUAL PHYSICAL EXAM: ICD-10-CM

## 2023-03-17 PROBLEM — E83.51 HYPOCALCEMIA: Status: RESOLVED | Noted: 2019-08-27 | Resolved: 2023-03-17

## 2023-03-17 PROBLEM — R82.90 ABNORMAL FINDING ON URINALYSIS: Status: RESOLVED | Noted: 2020-04-22 | Resolved: 2023-03-17

## 2023-03-17 PROBLEM — R80.8 OTHER PROTEINURIA: Status: RESOLVED | Noted: 2020-04-22 | Resolved: 2023-03-17

## 2023-03-17 PROBLEM — L02.419 THIGH ABSCESS: Status: RESOLVED | Noted: 2022-03-28 | Resolved: 2023-03-17

## 2023-03-17 PROBLEM — R81 GLUCOSURIA: Status: RESOLVED | Noted: 2020-04-22 | Resolved: 2023-03-17

## 2023-03-17 PROBLEM — R21 SKIN RASH: Status: RESOLVED | Noted: 2021-01-08 | Resolved: 2023-03-17

## 2023-03-17 PROBLEM — O24.319 MODIFIED WHITE CLASS C PREGESTATIONAL DIABETES MELLITUS: Status: RESOLVED | Noted: 2019-02-12 | Resolved: 2023-03-17

## 2023-03-17 LAB
APPEARANCE UR: NORMAL
BILIRUB UR STRIP-MCNC: NEGATIVE MG/DL
COLOR UR AUTO: YELLOW
GLUCOSE UR STRIP.AUTO-MCNC: 500 MG/DL
KETONES UR STRIP.AUTO-MCNC: NORMAL MG/DL
LEUKOCYTE ESTERASE UR QL STRIP.AUTO: NORMAL
NITRITE UR QL STRIP.AUTO: NEGATIVE
PH UR STRIP.AUTO: 6 [PH] (ref 5–8)
PROT UR QL STRIP: NEGATIVE MG/DL
RBC UR QL AUTO: NORMAL
SP GR UR STRIP.AUTO: 1.02
UROBILINOGEN UR STRIP-MCNC: 0.2 MG/DL

## 2023-03-17 PROCEDURE — 87480 CANDIDA DNA DIR PROBE: CPT

## 2023-03-17 PROCEDURE — 87660 TRICHOMONAS VAGIN DIR PROBE: CPT

## 2023-03-17 PROCEDURE — 87591 N.GONORRHOEAE DNA AMP PROB: CPT

## 2023-03-17 PROCEDURE — 87510 GARDNER VAG DNA DIR PROBE: CPT

## 2023-03-17 PROCEDURE — 87529 HSV DNA AMP PROBE: CPT | Mod: 91

## 2023-03-17 PROCEDURE — 87491 CHLMYD TRACH DNA AMP PROBE: CPT

## 2023-03-17 PROCEDURE — 99214 OFFICE O/P EST MOD 30 MIN: CPT | Performed by: NURSE PRACTITIONER

## 2023-03-17 PROCEDURE — 81002 URINALYSIS NONAUTO W/O SCOPE: CPT | Performed by: NURSE PRACTITIONER

## 2023-03-17 RX ORDER — FLUCONAZOLE 100 MG/1
TABLET ORAL
Qty: 40 TABLET | Refills: 0 | Status: SHIPPED | OUTPATIENT
Start: 2023-03-17

## 2023-03-17 ASSESSMENT — ENCOUNTER SYMPTOMS
GASTROINTESTINAL NEGATIVE: 1
EYES NEGATIVE: 1
FEVER: 0
SHORTNESS OF BREATH: 0
ROS SKIN COMMENTS: VULVA
CHILLS: 0
WEIGHT LOSS: 0
PALPITATIONS: 0
FLANK PAIN: 0

## 2023-03-17 ASSESSMENT — PATIENT HEALTH QUESTIONNAIRE - PHQ9: CLINICAL INTERPRETATION OF PHQ2 SCORE: 0

## 2023-03-17 NOTE — PROGRESS NOTES
"Chief Complaint   Patient presents with    [facility]     Vaginal bumps and yeast infection      Subjective:     Angelina Jarquin is a 32 y.o. female presenting for      Problem   Vaginal Sore    Noted on left labia majora on the inside.  Patient stated is painful to touch.  Slight noted blood from site     Prediabetes    Patient notes family history of diabetes  Recent hemoglobin A1c was 5.7     Vaginal Candidiasis    History of multiple for the last 2 years  C/o vaginal dc that is cottage cheese like in texture, itchiness, the entire vulvar region is red  States it comes and goes   Notes she has had around six in the last year she thinks  Last about a week or two then goes away  Is unsure if it is the mirena as she never has yeast infection since getting IUD placed she has notice them more often  Past notes show she has had multiple events of yeast infections in the past 2 years   States she uses a cream and in the past she has had diflucan which seemed to help  Denies chills, fevers, lower pelvic pain     Vitamin D Deficiency    Noted from previous labs  Does not currently take any supplementation     High Blood Triglycerides    Previous labs show elevation in triglycerides  Needs new labs     Thigh Abscess (Resolved)    Patient reports a right-sided inner thigh abscess that started about 2 days ago.  It has been getting increasing pain and erythema and swelling since then.  They tried to \"pop it\" yesterday with no success.  No significant discharge, no fevers.     Skin Rash (Resolved)   Glucosuria (Resolved)   Abnormal Finding On Urinalysis (Resolved)   Other Proteinuria (Resolved)   Serum calcium decreased (Resolved)    IMO load 2020     Postpartum Care Following  Delivery (Resolved)   Modified White Class C Pregestational Diabetes Mellitus (Resolved)        Review of Systems   Constitutional:  Negative for chills, fever, malaise/fatigue and weight loss.   HENT: Negative.     Eyes: " "Negative.    Respiratory:  Negative for shortness of breath.    Cardiovascular:  Negative for chest pain and palpitations.   Gastrointestinal: Negative.    Genitourinary:  Negative for dysuria, flank pain, frequency, hematuria and urgency.   Skin:  Positive for itching and rash.        vulva        Current Outpatient Medications:     Levonorgestrel (MIRENA, 52 MG, IU), by Intrauterine route. Placed , Disp: , Rfl:     fluconazole (DIFLUCAN) 100 MG Tab, Take 1.5 tablets Every 72 hours for three doses then take 1.5 tablets once weekly for 6 months, Disp: 40 Tablet, Rfl: 0    miconazole (MICOTIN) 2 % Cream, Apply 1 Application. topically 2 times a day., Disp: 57 g, Rfl: 0    Past Medical History:   Diagnosis Date    Diabetes (HCC)     gestational diabetes    Hypertension     during pregnanacy only    Modified White class C pregestational diabetes mellitus 2019    Other proteinuria 2020    Thigh abscess 3/28/2022    Patient reports a right-sided inner thigh abscess that started about 2 days ago.  It has been getting increasing pain and erythema and swelling since then.  They tried to \"pop it\" yesterday with no success.  No significant discharge, no fevers.       Past Surgical History:   Procedure Laterality Date    REPEAT C SECTOIN WITH SALPINGECTOMY Bilateral 2019    Procedure:  SECTION, REPEAT, WITH SALPINGECTOMY;  Surgeon: Lexis Linton D.O.;  Location: LABOR AND DELIVERY;  Service: Obstetrics    PRIMARY C SECTION  2017    Procedure: PRIMARY C SECTION;  Surgeon: Goran Webb M.D.;  Location: LABOR AND DELIVERY;  Service: Gynecology       Social History     Tobacco Use    Smoking status: Never    Smokeless tobacco: Never   Vaping Use    Vaping Use: Never used   Substance Use Topics    Alcohol use: Yes     Comment: occasional    Drug use: No       Family History   Problem Relation Age of Onset    Hypertension Mother     Hyperlipidemia Father     No Known Problems Sister     No Known " "Problems Brother     No Known Problems Maternal Uncle     No Known Problems Maternal Grandmother     No Known Problems Maternal Grandfather     No Known Problems Paternal Grandmother     No Known Problems Paternal Grandfather     No Known Problems Daughter     No Known Problems Daughter     No Known Problems Maternal Aunt        Patient has no known allergies.    Allergies, past medical history, past surgical history, family history, social history reviewed and updated    Objective:     Vitals: /80 (BP Location: Left arm, Patient Position: Sitting, BP Cuff Size: Adult long)   Pulse (!) 101   Temp 36.2 °C (97.1 °F) (Temporal)   Resp 14   Ht 1.473 m (4' 10\")   Wt 70.9 kg (156 lb 3.2 oz)   LMP 02/17/2023 (Within Weeks)   SpO2 97%   BMI 32.65 kg/m²     Physical Exam  Genitourinary:     Labia:         Right: Rash, tenderness and lesion present. No injury.         Left: Rash present. No tenderness, lesion or injury.           Comments: Whole vulva area erythematous       Assessment/Plan:     1. Annual physical exam  - HEMOGLOBIN A1C; Future  - CBC WITHOUT DIFFERENTIAL; Future  - FREE THYROXINE; Future  - TSH; Future  - Lipid Profile; Future  - Comp Metabolic Panel; Future    2. Vaginal discharge  Chronic and exacerbated condition  - POCT Urinalysis   Latest Reference Range & Units 03/17/23 09:30   POC Color Negative  Yellow   POC Appearance Negative  Turbid   POC Specific Gravity <1.005 - >1.030  1.025   POC Urine PH 5.0 - 8.0  6.0   POC Glucose Negative mg/dL 500   POC Ketones Negative mg/dL Trace   POC Protein Negative mg/dL Negative   POC Nitrites Negative  Negative   POC Leukocyte Esterase Negative  Trace   POC Blood Negative  Large   POC Bilirubin Negative mg/dL Negative   POC Urobiligen Negative (0.2) mg/dL 0.2     - VAGINAL PATHOGENS DNA PANEL; Future  - Chlamydia/GC, PCR (Urine); Future  - Referral to OB/Gyn  - HERPES VIRAL CULTURE    3. Vaginal candidiasis  Chronic exacerbated condition  - " Chlamydia/GC, PCR (Urine); Future  - Referral to OB/Gyn    4. Vaginal sore  Undiagnosed new condition uncertain prognosis  - HERPES VIRAL CULTURE    5. Vulvar pain  Chronic and exacerbated condition  - Referral to OB/Gyn  - HERPES VIRAL CULTURE    6. Prediabetes  Chronic and stable condition  - HEMOGLOBIN A1C; Future  - Comp Metabolic Panel; Future    7. High triglycerides  Chronic and stable condition  - Lipid Profile; Future  8. High blood triglycerides    9. Vitamin D deficiency  Chronic and stable condition    10. IUD (intrauterine device) in place  Chronic and stable condition  - Referral to OB/Gyn       Discussed with patient possible alternative diagnoses, patient is to take all medications as prescribed.     If symptoms persist FU w/PCP, if symptoms worsen go to emergency room.     If experiencing any side effects from prescribed medications reports to the office immediately or go to emergency room.    Reviewed indication, dosage, usage and potential adverse effects of prescribed medications.     Reviewed risks and benefits of treatment plan. Patient verbalizes understanding of all instruction and verbally agrees to plan.    Discussed plan with the patient, and she agrees to the above.      I personally reviewed prior external notes and test results pertinent to today's visit.        Return in about 4 weeks (around 4/14/2023) for pending labs.      Please note that this dictation was created using voice recognition software. I have made every reasonable attempt to correct obvious errors, but I expect that there may be errors of grammar and possibly content that I did not discover before finalizing the note.

## 2023-03-19 LAB
C TRACH DNA GENITAL QL NAA+PROBE: NEGATIVE
N GONORRHOEA DNA GENITAL QL NAA+PROBE: NEGATIVE
SPECIMEN SOURCE: NORMAL

## 2023-03-21 DIAGNOSIS — B96.89 BACTERIAL VAGINOSIS: ICD-10-CM

## 2023-03-21 DIAGNOSIS — N76.0 BACTERIAL VAGINOSIS: ICD-10-CM

## 2023-03-21 LAB
HSV1 DNA CSF QL NAA+PROBE: NOT DETECTED
HSV2 DNA CSF QL NAA+PROBE: NOT DETECTED
SPECIMEN SOURCE: NORMAL

## 2023-03-21 RX ORDER — METRONIDAZOLE 500 MG/1
500 TABLET ORAL 2 TIMES DAILY
Qty: 14 TABLET | Refills: 0 | Status: SHIPPED | OUTPATIENT
Start: 2023-03-21 | End: 2023-05-04

## 2023-05-04 ENCOUNTER — OFFICE VISIT (OUTPATIENT)
Dept: MEDICAL GROUP | Facility: PHYSICIAN GROUP | Age: 33
End: 2023-05-04
Payer: COMMERCIAL

## 2023-05-04 VITALS
DIASTOLIC BLOOD PRESSURE: 80 MMHG | HEIGHT: 58 IN | WEIGHT: 160.4 LBS | SYSTOLIC BLOOD PRESSURE: 138 MMHG | OXYGEN SATURATION: 98 % | HEART RATE: 98 BPM | TEMPERATURE: 97.5 F | RESPIRATION RATE: 12 BRPM | BODY MASS INDEX: 33.67 KG/M2

## 2023-05-04 DIAGNOSIS — H00.014 HORDEOLUM EXTERNUM OF LEFT UPPER EYELID: ICD-10-CM

## 2023-05-04 PROCEDURE — 99214 OFFICE O/P EST MOD 30 MIN: CPT | Performed by: NURSE PRACTITIONER

## 2023-05-04 RX ORDER — ERYTHROMYCIN 5 MG/G
1 OINTMENT OPHTHALMIC
Qty: 3.5 G | Refills: 0 | Status: SHIPPED | OUTPATIENT
Start: 2023-05-04

## 2023-05-04 ASSESSMENT — ENCOUNTER SYMPTOMS
EYE REDNESS: 0
EYE PAIN: 1
PHOTOPHOBIA: 0
CHILLS: 0
EYE DISCHARGE: 0
BLURRED VISION: 0
DOUBLE VISION: 0
SHORTNESS OF BREATH: 0
FEVER: 0

## 2023-05-04 ASSESSMENT — VISUAL ACUITY: OU: 1

## 2023-05-05 NOTE — PROGRESS NOTES
"CC:  Chief Complaint   Patient presents with    Stye     X1wk left burning       HISTORY OF PRESENT ILLNESS: Patient is a 32 y.o. female established patient presenting     Problem   Hordeolum Externum of Left Upper Eyelid    Patient notes onset roughly 1 week ago.  She states however this morning she woke up and her stye was more painful and enlarged.  She states that she has been having to take some time off of work because of the discomfort that she has been having.  Patient has been using warm compresses with chamomile tea and recently bought a stye ointment that has mineral oil and petroleum jelly in it and does not seem to be noticing any improvement.  Patient denies any change in her vision, orbital cellulitis, discharge or drainage from the eye, chills or fevers.           Review of Systems   Constitutional:  Negative for chills and fever.   Eyes:  Positive for pain. Negative for blurred vision, double vision, photophobia, discharge and redness.        Redness and bump to left upper eyelid closer to inner eye   Respiratory:  Negative for shortness of breath.    Cardiovascular:  Negative for chest pain.           - NOTE: All other systems reviewed and are negative, except as in HPI.      Exam:    /80 (BP Location: Right arm, Patient Position: Sitting, BP Cuff Size: Adult)   Pulse 98   Temp 36.4 °C (97.5 °F) (Temporal)   Resp 12   Ht 1.473 m (4' 10\")   Wt 72.8 kg (160 lb 6.4 oz)   SpO2 98%  Body mass index is 33.52 kg/m².    Physical Exam  Eyes:      General: Vision grossly intact.         Left eye: Hordeolum present.     Extraocular Movements: Extraocular movements intact.      Conjunctiva/sclera: Conjunctivae normal.           Assessment/Plan:    1. Hordeolum externum of left upper eyelid  Acute uncomplicated condition  Patient to continue with warm compresses 3 times a day  Provided patient with work note  - erythromycin 5 MG/GM Ointment; Apply 1 Application. to both eyes at bedtime.  Dispense: " 3.5 g; Refill: 0       Discussed with patient possible alternative diagnoses, patient is to take all medications as prescribed.     If symptoms persist FU w/PCP, if symptoms worsen go to emergency room.     If experiencing any side effects from prescribed medications reports to the office immediately or go to emergency room.    Reviewed indication, dosage, usage and potential adverse effects of prescribed medications.     Reviewed risks and benefits of treatment plan. Patient verbalizes understanding of all instruction and verbally agrees to plan.      No follow-ups on file.      Please note that this dictation was created using voice recognition software. I have made every reasonable attempt to correct obvious errors, but I expect that there are errors of grammar and possibly content that I did not discover before finalizing the note.

## 2023-09-21 ENCOUNTER — OFFICE VISIT (OUTPATIENT)
Dept: URGENT CARE | Facility: CLINIC | Age: 33
End: 2023-09-21
Payer: COMMERCIAL

## 2023-09-21 VITALS
WEIGHT: 156 LBS | BODY MASS INDEX: 30.63 KG/M2 | RESPIRATION RATE: 18 BRPM | HEIGHT: 60 IN | SYSTOLIC BLOOD PRESSURE: 132 MMHG | DIASTOLIC BLOOD PRESSURE: 84 MMHG | TEMPERATURE: 96.5 F | HEART RATE: 93 BPM | OXYGEN SATURATION: 96 %

## 2023-09-21 DIAGNOSIS — L03.213 PRESEPTAL CELLULITIS OF RIGHT EYE: ICD-10-CM

## 2023-09-21 DIAGNOSIS — H00.013 HORDEOLUM EXTERNUM OF RIGHT EYE, UNSPECIFIED EYELID: ICD-10-CM

## 2023-09-21 DIAGNOSIS — H57.89 EYE SWELLING: ICD-10-CM

## 2023-09-21 PROCEDURE — 3075F SYST BP GE 130 - 139MM HG: CPT | Performed by: PHYSICIAN ASSISTANT

## 2023-09-21 PROCEDURE — 3079F DIAST BP 80-89 MM HG: CPT | Performed by: PHYSICIAN ASSISTANT

## 2023-09-21 PROCEDURE — 99213 OFFICE O/P EST LOW 20 MIN: CPT | Performed by: PHYSICIAN ASSISTANT

## 2023-09-21 RX ORDER — MOXIFLOXACIN 5 MG/ML
1 SOLUTION/ DROPS OPHTHALMIC 3 TIMES DAILY
Qty: 3 ML | Refills: 0 | Status: SHIPPED | OUTPATIENT
Start: 2023-09-21 | End: 2023-09-21

## 2023-09-21 RX ORDER — MOXIFLOXACIN 5 MG/ML
1 SOLUTION/ DROPS OPHTHALMIC 3 TIMES DAILY
Qty: 3 ML | Refills: 0 | Status: SHIPPED | OUTPATIENT
Start: 2023-09-21 | End: 2023-09-28

## 2023-09-21 RX ORDER — AMOXICILLIN AND CLAVULANATE POTASSIUM 875; 125 MG/1; MG/1
1 TABLET, FILM COATED ORAL 2 TIMES DAILY
Qty: 10 TABLET | Refills: 0 | Status: SHIPPED | OUTPATIENT
Start: 2023-09-21 | End: 2023-09-26

## 2023-09-21 ASSESSMENT — ENCOUNTER SYMPTOMS
CONSTITUTIONAL NEGATIVE: 1
RESPIRATORY NEGATIVE: 1
BLURRED VISION: 0
EYE DISCHARGE: 1
DOUBLE VISION: 0
EYE PAIN: 1
EYE REDNESS: 1
PHOTOPHOBIA: 0
CARDIOVASCULAR NEGATIVE: 1

## 2023-09-21 NOTE — PROGRESS NOTES
"  Subjective:     Angelina Jarquin  is a 33 y.o. female who presents for Eye Problem (R upper eyelid swelling, watery, redness, burning sensation x Tuesday )       She presents today with right-sided upper and lower eyelid pain and swelling that is been ongoing for the last 24 hours.  Has increased tear production over the right eye.  She denies any trauma or injury associated with symptom onset.  She rarely wears contacts and has not worn contacts within the last 7 days.  Denies any change in vision or blurry vision, no photophobia, no double vision.  She has had styes in the past.  No fevers, no chest pain or shortness of breath       Review of Systems   Constitutional: Negative.    Eyes:  Positive for pain, discharge and redness. Negative for blurred vision, double vision and photophobia.   Respiratory: Negative.     Cardiovascular: Negative.       No Known Allergies  Past Medical History:   Diagnosis Date    Diabetes (HCC)     gestational diabetes    Hypertension     during pregnanacy only    Modified White class C pregestational diabetes mellitus 2/12/2019    Other proteinuria 4/22/2020    Thigh abscess 3/28/2022    Patient reports a right-sided inner thigh abscess that started about 2 days ago.  It has been getting increasing pain and erythema and swelling since then.  They tried to \"pop it\" yesterday with no success.  No significant discharge, no fevers.        Objective:   /84   Pulse 93   Temp 35.8 °C (96.5 °F) (Temporal)   Resp 18   Ht 1.524 m (5')   Wt 70.8 kg (156 lb)   SpO2 96%   BMI 30.47 kg/m²   Physical Exam  Vitals and nursing note reviewed.   Constitutional:       General: She is not in acute distress.     Appearance: She is not ill-appearing or toxic-appearing.   HENT:      Head: Normocephalic.      Nose: No rhinorrhea.   Eyes:      General: No scleral icterus.        Right eye: Discharge present.         Left eye: No discharge.      Extraocular Movements: Extraocular movements " intact.      Conjunctiva/sclera: Conjunctivae normal.      Pupils: Pupils are equal, round, and reactive to light.      Comments: Examination of the right I does reveal localized tenderness and swelling over all aspects of the upper and lower eyelids.  There is a firm nodule present within the middle one third of the upper eyelid in the middle one third of the lower eyelid.  Both nodules are firm and extremely tender to palpation.   Pulmonary:      Effort: Pulmonary effort is normal. No respiratory distress.      Breath sounds: No stridor.   Musculoskeletal:      Cervical back: Neck supple.   Neurological:      Mental Status: She is alert and oriented to person, place, and time.   Psychiatric:         Mood and Affect: Mood normal.         Behavior: Behavior normal.         Thought Content: Thought content normal.         Judgment: Judgment normal.             Diagnostic testing: None    Assessment/Plan:     Encounter Diagnoses   Name Primary?    Preseptal cellulitis of right eye     Eye swelling     Hordeolum externum of right eye, unspecified eyelid           Plan for care for today's complaint includes the patient on Augmentin for preseptal cellulitis of upper and lower eyelids of the right eye.  Moxifloxacin eyedrops given to the patient for hordeolum management as well as management of her eye discharge present on exam today.  Patient does wear contacts but has not worn them for the past week so we will proceed with moxifloxacin eyedrops, patient instructed to withhold from contact lens use until symptoms have fully resolved..  Continue to monitor symptoms and return to urgent care or follow-up with primary care provider if symptoms remain ongoing.  Follow-up in the emergency department if symptoms become severe, ER precautions discussed in office today..  Prescription for Augmentin, ofloxacin provided.    See AVS Instructions below for written guidance provided to patient on after-visit management and care in  addition to our verbal discussion during the visit.    Please note that this dictation was created using voice recognition software. I have attempted to correct all errors, but there may be sound-alike, spelling, grammar and possibly content errors that I did not discover before finalizing the note.    Mina Mock PA-C

## 2023-09-21 NOTE — LETTER
[facility]  [address] [id number]     September 21, 2023    Patient: Angelina Jarquin   YOB: 1990   Date of Visit: 9/21/2023       To Whom It May Concern:    Angelina Jarquin was seen and treated in our department on 9/21/2023.  Please excuse from work on 9/21/2023 9/22/2023, can return thereafter    Sincerely,     Mina Mock P.A.-C.

## 2025-06-27 ENCOUNTER — OFFICE VISIT (OUTPATIENT)
Dept: URGENT CARE | Facility: CLINIC | Age: 35
End: 2025-06-27
Payer: COMMERCIAL

## 2025-06-27 VITALS
HEART RATE: 107 BPM | HEIGHT: 59 IN | TEMPERATURE: 97.8 F | OXYGEN SATURATION: 95 % | DIASTOLIC BLOOD PRESSURE: 90 MMHG | WEIGHT: 153 LBS | SYSTOLIC BLOOD PRESSURE: 138 MMHG | BODY MASS INDEX: 30.84 KG/M2

## 2025-06-27 DIAGNOSIS — R03.0 ELEVATED BP WITHOUT DIAGNOSIS OF HYPERTENSION: Primary | ICD-10-CM

## 2025-06-27 PROCEDURE — 3078F DIAST BP <80 MM HG: CPT | Performed by: PHYSICIAN ASSISTANT

## 2025-06-27 PROCEDURE — 3075F SYST BP GE 130 - 139MM HG: CPT | Performed by: PHYSICIAN ASSISTANT

## 2025-06-27 PROCEDURE — 99213 OFFICE O/P EST LOW 20 MIN: CPT | Performed by: PHYSICIAN ASSISTANT

## 2025-06-27 ASSESSMENT — ENCOUNTER SYMPTOMS
HEADACHES: 1
BLURRED VISION: 0
FEVER: 0
DOUBLE VISION: 0
SHORTNESS OF BREATH: 0
VOMITING: 0
COUGH: 0
NAUSEA: 0
DIZZINESS: 0
FOCAL WEAKNESS: 0
CHILLS: 0
TINGLING: 0
SENSORY CHANGE: 0

## 2025-06-27 NOTE — LETTER
June 27, 2025         Patient: Angelina Jarquin   YOB: 1990   Date of Visit: 6/27/2025           To Whom it May Concern:    Angelina Jarquin was seen in my clinic on 6/27/2025. She may return to work on 6/30/2025.    If you have any questions or concerns, please don't hesitate to call.        Sincerely,           Anushka Trimble P.A.-C.  Electronically Signed

## 2025-06-27 NOTE — PROGRESS NOTES
"Subjective     Angelina Jarquin is a 34 y.o. female who presents with Follow-Up (Pt was at the dentist and her blood pressure was 160/116)            Patient is here today with BP concerns. The patient was seen at the dentist yesterday to get her wisdom teeth removed. The procedure was cancelled because her BP was 160/116. She does admit to being nervous. She was advised to follow-up with her PCP. She has an appointment in 2 weeks. She is here today for a BP check and to make sure she is okay. She denies any active symptoms. She states she does get mild occasional headaches. No dizziness, vision changes, weakness, numbness, chest pain or shortness of breath. The patient did have hypertension while pregnant.      Past Medical History[1]      Past Surgical History[2]    Family History   Problem Relation Age of Onset    Hypertension Mother     Hyperlipidemia Father     No Known Problems Sister     No Known Problems Brother     No Known Problems Maternal Uncle     No Known Problems Maternal Grandmother     No Known Problems Maternal Grandfather     No Known Problems Paternal Grandmother     No Known Problems Paternal Grandfather     No Known Problems Daughter     No Known Problems Daughter     No Known Problems Maternal Aunt        Patient has no known allergies.    Medications, Allergies, and current problem list reviewed today in Epic    Review of Systems   Constitutional:  Negative for chills and fever.   Eyes:  Negative for blurred vision and double vision.   Respiratory:  Negative for cough and shortness of breath.    Cardiovascular:  Negative for chest pain.   Gastrointestinal:  Negative for nausea and vomiting.   Neurological:  Positive for headaches. Negative for dizziness, tingling, sensory change and focal weakness.        All other systems reviewed and are negative.         Objective     BP (!) 138/90   Pulse (!) 107   Temp 36.6 °C (97.8 °F) (Temporal)   Ht 1.499 m (4' 11\")   Wt 69.4 kg (153 lb)   " SpO2 95%   BMI 30.90 kg/m²      Physical Exam  Constitutional:       General: She is not in acute distress.     Appearance: She is not ill-appearing.   HENT:      Head: Normocephalic and atraumatic.   Eyes:      Conjunctiva/sclera: Conjunctivae normal.   Cardiovascular:      Rate and Rhythm: Regular rhythm. Tachycardia present.   Pulmonary:      Effort: Pulmonary effort is normal. No respiratory distress.      Breath sounds: No stridor. No wheezing.   Skin:     General: Skin is warm and dry.   Neurological:      General: No focal deficit present.      Mental Status: She is alert and oriented to person, place, and time.   Psychiatric:         Mood and Affect: Mood normal.         Behavior: Behavior normal.         Thought Content: Thought content normal.         Judgment: Judgment normal.                                  Assessment & Plan  Elevated BP without diagnosis of hypertension       Borderline elevation here today.  Recommend continue to monitor with home monitor.  Keep BP diary. Follow-up with PCP  REgular exercise and diet modification    Differential diagnoses, Supportive care, and indications for immediate follow-up discussed with patient.   Pathogenesis of diagnosis discussed including typical length and natural progression.   Instructed to return to clinic or nearest emergency department for any change in condition, further concerns, or worsening of symptoms.    The patient demonstrated a good understanding and agreed with the treatment plan.    Anushka Trimble P.A.-C.                      [1]   Past Medical History:  Diagnosis Date    Diabetes (HCC)     gestational diabetes    Hypertension     during pregnanacy only    Modified White class C pregestational diabetes mellitus 2/12/2019    Other proteinuria 4/22/2020    Thigh abscess 3/28/2022    Patient reports a right-sided inner thigh abscess that started about 2 days ago.  It has been getting increasing pain and erythema and swelling since then.   "They tried to \"pop it\" yesterday with no success.  No significant discharge, no fevers.   [2]   Past Surgical History:  Procedure Laterality Date    REPEAT C SECTOIN WITH SALPINGECTOMY Bilateral 2019    Procedure:  SECTION, REPEAT, WITH SALPINGECTOMY;  Surgeon: Lexis Linton D.O.;  Location: LABOR AND DELIVERY;  Service: Obstetrics    PRIMARY C SECTION  2017    Procedure: PRIMARY C SECTION;  Surgeon: Goran Webb M.D.;  Location: LABOR AND DELIVERY;  Service: Gynecology     "

## 2025-07-17 ENCOUNTER — OFFICE VISIT (OUTPATIENT)
Dept: MEDICAL GROUP | Facility: PHYSICIAN GROUP | Age: 35
End: 2025-07-17
Payer: COMMERCIAL

## 2025-07-17 VITALS
BODY MASS INDEX: 30.84 KG/M2 | HEART RATE: 92 BPM | DIASTOLIC BLOOD PRESSURE: 90 MMHG | TEMPERATURE: 97 F | OXYGEN SATURATION: 99 % | WEIGHT: 153 LBS | SYSTOLIC BLOOD PRESSURE: 144 MMHG | HEIGHT: 59 IN | RESPIRATION RATE: 12 BRPM

## 2025-07-17 DIAGNOSIS — Z00.00 BLOOD TESTS FOR ROUTINE GENERAL PHYSICAL EXAMINATION: Primary | ICD-10-CM

## 2025-07-17 DIAGNOSIS — R03.0 ELEVATED BP WITHOUT DIAGNOSIS OF HYPERTENSION: ICD-10-CM

## 2025-07-17 PROCEDURE — 3077F SYST BP >= 140 MM HG: CPT | Performed by: NURSE PRACTITIONER

## 2025-07-17 PROCEDURE — 3080F DIAST BP >= 90 MM HG: CPT | Performed by: NURSE PRACTITIONER

## 2025-07-17 PROCEDURE — 99214 OFFICE O/P EST MOD 30 MIN: CPT | Performed by: NURSE PRACTITIONER

## 2025-07-17 RX ORDER — IBUPROFEN 600 MG/1
600 TABLET, FILM COATED ORAL EVERY 6 HOURS PRN
COMMUNITY
Start: 2025-06-26 | End: 2025-07-17

## 2025-07-17 RX ORDER — CHLORHEXIDINE GLUCONATE ORAL RINSE 1.2 MG/ML
SOLUTION DENTAL
COMMUNITY
Start: 2025-06-26 | End: 2025-07-17

## 2025-07-17 RX ORDER — HYDROCODONE BITARTRATE AND ACETAMINOPHEN 5; 325 MG/1; MG/1
1 TABLET ORAL EVERY 6 HOURS PRN
COMMUNITY
Start: 2025-06-26 | End: 2025-07-17

## 2025-07-17 RX ORDER — AMOXICILLIN 500 MG/1
CAPSULE ORAL
COMMUNITY
Start: 2025-06-26 | End: 2025-07-17

## 2025-07-17 ASSESSMENT — ENCOUNTER SYMPTOMS
HEADACHES: 0
DIZZINESS: 0
NAUSEA: 0
NERVOUS/ANXIOUS: 0
WEIGHT LOSS: 0
VOMITING: 0
BLURRED VISION: 0
DEPRESSION: 0
SHORTNESS OF BREATH: 0
CONSTIPATION: 0
MUSCULOSKELETAL NEGATIVE: 1
PALPITATIONS: 0
HEARTBURN: 0
ABDOMINAL PAIN: 0
FEVER: 0
CHILLS: 0
DIARRHEA: 0
EYES NEGATIVE: 1

## 2025-07-17 ASSESSMENT — PATIENT HEALTH QUESTIONNAIRE - PHQ9: CLINICAL INTERPRETATION OF PHQ2 SCORE: 0

## 2025-07-17 NOTE — PROGRESS NOTES
Verbal consent was acquired by the patient to use [employer] ambient listening note generation during this visit     CC:  Chief Complaint   Patient presents with    Follow-Up     UC visit for BP       Angelina Jarquin 34 y.o. female  patient presenting for     History of Present Illness  The patient presents today to follow up on a recent urgent care visit after seeing her dentist.    Blood Pressure Concerns  - She had a blood pressure of 160/101 and was concerned about anxiety related to an upcoming wisdom teeth removal, which led to the procedure being postponed.  - The next day, she visited urgent care where her blood pressure was recorded in the 140s.  - She reports no symptoms such as headaches, vision changes, or chest pain prior to these events.  - She has not been monitoring her blood pressure at home due to a malfunctioning device.  - She reports no chest palpitations, heaviness, or racing heart.  - She also reports no tinnitus, reflux, heartburn, nausea, vomiting, diarrhea, constipation, or leg swelling.  - She does not have any thoughts of self-harm or harm to others.    Sleep and Snoring  - She occasionally wakes up at night to check on her daughter but does not consider this a sleep issue.  - Her  has mentioned mild snoring, but she feels rested upon waking.  - She reports no dizziness or headaches.    Diet and Physical Activity  - She has made dietary changes, including reducing carbohydrate intake and limiting rice consumption.  - Her physical activity includes walking with her daughters and running around [employer].        Review of Systems   Constitutional:  Negative for chills, fever, malaise/fatigue and weight loss.   HENT: Negative.  Negative for tinnitus.    Eyes: Negative.  Negative for blurred vision.   Respiratory:  Negative for shortness of breath.    Cardiovascular:  Negative for chest pain and palpitations.   Gastrointestinal:  Negative for abdominal pain, constipation, diarrhea,  "heartburn, nausea and vomiting.   Genitourinary: Negative.    Musculoskeletal: Negative.    Skin: Negative.    Neurological:  Negative for dizziness and headaches.   Psychiatric/Behavioral:  Negative for depression and suicidal ideas. The patient is not nervous/anxious.        BP (!) 144/90   Pulse 92   Temp 36.1 °C (97 °F) (Temporal)   Resp 12   Ht 1.499 m (4' 11\")   Wt 69.4 kg (153 lb)   SpO2 99% , Body mass index is 30.9 kg/m².    Physical Exam  Constitutional:       Appearance: Normal appearance. She is normal weight.   HENT:      Head: Normocephalic and atraumatic.      Right Ear: Tympanic membrane, ear canal and external ear normal.      Left Ear: Tympanic membrane, ear canal and external ear normal.      Nose: Nose normal.      Mouth/Throat:      Mouth: Mucous membranes are moist.      Pharynx: Oropharynx is clear.   Eyes:      Extraocular Movements: Extraocular movements intact.      Conjunctiva/sclera: Conjunctivae normal.      Pupils: Pupils are equal, round, and reactive to light.   Cardiovascular:      Rate and Rhythm: Normal rate and regular rhythm.      Pulses: Normal pulses.      Heart sounds: Normal heart sounds.   Pulmonary:      Effort: Pulmonary effort is normal.      Breath sounds: Normal breath sounds.   Musculoskeletal:         General: Normal range of motion.      Cervical back: Normal range of motion and neck supple. No tenderness.   Lymphadenopathy:      Cervical: No cervical adenopathy.   Skin:     General: Skin is warm and dry.      Capillary Refill: Capillary refill takes less than 2 seconds.   Neurological:      Mental Status: She is alert and oriented to person, place, and time.   Psychiatric:         Mood and Affect: Mood normal.         Behavior: Behavior normal.         Thought Content: Thought content normal.         Judgment: Judgment normal.           Results      Assessment and Plan    Assessment & Plan  1. Elevated blood pressure.  - Blood pressure today is 144/90, with a " target of <130/80.  - No current symptoms such as headaches, vision changes, chest pain, dizziness, or headaches.  - Advised to purchase a blood pressure cuff and monitor blood pressure twice daily, before caffeine or alcohol, and record readings.  - Ordered comprehensive labs including CBC, thyroid function tests, hemoglobin A1c, cholesterol panel, CMP, and vitamin D level. Labs are fasting, with no food or drink for 8-10 hours prior, except water. If home readings are consistently <130/80, she can proceed with dental procedure; otherwise, follow-up for potential antihypertensive medication. Focus on diet and increased physical activity if readings are in the high 130s.     1. Blood tests for routine general physical examination (Primary)  - CBC WITHOUT DIFFERENTIAL; Future  - FREE THYROXINE; Future  - HEMOGLOBIN A1C; Future  - Lipid Profile; Future  - VITAMIN D,25 HYDROXY (DEFICIENCY); Future  - TSH; Future  - Comp Metabolic Panel; Future    2. Elevated BP without diagnosis of hypertension  Acute and uncomplicated condition   Possible due to anxiety  Mother with history  Will get journal and follow uop in 1-2 weeks for BP journal an Labs     Discussed with patient possible alternative diagnoses, patient is to take all medications as prescribed.     If symptoms persist FU w/PCP, if symptoms worsen go to emergency room.     If experiencing any side effects from prescribed medications reports to the office immediately or go to emergency room.    Reviewed indication, dosage, usage and potential adverse effects of prescribed medications.     Reviewed risks and benefits of treatment plan. Patient verbalizes understanding of all instruction and verbally agrees to plan.    Return in about 2 weeks (around 7/31/2025) for labs and BP journal.    This note was created using voice recognition software ([employer]). The accuracy of the dictation is limited by the abilities of the software. I have reviewed the note prior to  signing, however some errors in grammar and context are still possible. If you have any questions related to this note please do not hesitate to contact our office.

## 2025-08-07 ENCOUNTER — OFFICE VISIT (OUTPATIENT)
Dept: MEDICAL GROUP | Facility: PHYSICIAN GROUP | Age: 35
End: 2025-08-07
Payer: COMMERCIAL

## 2025-08-07 VITALS
WEIGHT: 144 LBS | DIASTOLIC BLOOD PRESSURE: 102 MMHG | HEART RATE: 106 BPM | HEIGHT: 59 IN | BODY MASS INDEX: 29.03 KG/M2 | OXYGEN SATURATION: 97 % | SYSTOLIC BLOOD PRESSURE: 146 MMHG | TEMPERATURE: 98.6 F

## 2025-08-07 DIAGNOSIS — I10 PRIMARY HYPERTENSION: ICD-10-CM

## 2025-08-07 DIAGNOSIS — E11.65 TYPE 2 DIABETES MELLITUS WITH HYPERGLYCEMIA, WITHOUT LONG-TERM CURRENT USE OF INSULIN (HCC): Primary | ICD-10-CM

## 2025-08-07 DIAGNOSIS — D75.1 POLYCYTHEMIA: ICD-10-CM

## 2025-08-07 PROCEDURE — 99214 OFFICE O/P EST MOD 30 MIN: CPT | Performed by: NURSE PRACTITIONER

## 2025-08-07 PROCEDURE — 3080F DIAST BP >= 90 MM HG: CPT | Performed by: NURSE PRACTITIONER

## 2025-08-07 PROCEDURE — 3077F SYST BP >= 140 MM HG: CPT | Performed by: NURSE PRACTITIONER

## 2025-08-07 RX ORDER — METFORMIN HYDROCHLORIDE 500 MG/1
500 TABLET, EXTENDED RELEASE ORAL DAILY
Qty: 100 TABLET | Refills: 3 | Status: SHIPPED | OUTPATIENT
Start: 2025-08-07 | End: 2025-08-27 | Stop reason: SDUPTHER

## 2025-08-07 RX ORDER — LOSARTAN POTASSIUM AND HYDROCHLOROTHIAZIDE 12.5; 5 MG/1; MG/1
1 TABLET ORAL DAILY
Qty: 100 TABLET | Refills: 3 | Status: SHIPPED | OUTPATIENT
Start: 2025-08-07 | End: 2026-09-11

## 2025-08-08 ASSESSMENT — ENCOUNTER SYMPTOMS
SHORTNESS OF BREATH: 0
MUSCULOSKELETAL NEGATIVE: 1
POLYDIPSIA: 0
PALPITATIONS: 0
CHILLS: 0
HEADACHES: 0
FEVER: 0
WEIGHT LOSS: 0
DIZZINESS: 0
RESPIRATORY NEGATIVE: 1

## 2025-08-27 ENCOUNTER — OFFICE VISIT (OUTPATIENT)
Dept: MEDICAL GROUP | Facility: PHYSICIAN GROUP | Age: 35
End: 2025-08-27
Payer: COMMERCIAL

## 2025-08-27 VITALS
DIASTOLIC BLOOD PRESSURE: 82 MMHG | HEIGHT: 61 IN | WEIGHT: 154 LBS | RESPIRATION RATE: 14 BRPM | TEMPERATURE: 97.6 F | SYSTOLIC BLOOD PRESSURE: 124 MMHG | BODY MASS INDEX: 29.07 KG/M2 | OXYGEN SATURATION: 98 % | HEART RATE: 97 BPM

## 2025-08-27 DIAGNOSIS — E11.65 TYPE 2 DIABETES MELLITUS WITH HYPERGLYCEMIA, WITHOUT LONG-TERM CURRENT USE OF INSULIN (HCC): ICD-10-CM

## 2025-08-27 PROBLEM — R73.03 PREDIABETES: Status: RESOLVED | Noted: 2023-03-17 | Resolved: 2025-08-27

## 2025-08-27 PROCEDURE — 99214 OFFICE O/P EST MOD 30 MIN: CPT | Performed by: NURSE PRACTITIONER

## 2025-08-27 PROCEDURE — 3074F SYST BP LT 130 MM HG: CPT | Performed by: NURSE PRACTITIONER

## 2025-08-27 PROCEDURE — 3079F DIAST BP 80-89 MM HG: CPT | Performed by: NURSE PRACTITIONER

## 2025-08-27 RX ORDER — METFORMIN HYDROCHLORIDE 500 MG/1
1000 TABLET, EXTENDED RELEASE ORAL DAILY
Qty: 200 TABLET | Refills: 3 | Status: SHIPPED | OUTPATIENT
Start: 2025-08-27 | End: 2026-10-01

## 2025-08-27 RX ORDER — EMPAGLIFLOZIN 10 MG/1
10 TABLET, FILM COATED ORAL DAILY
Qty: 90 TABLET | Refills: 0 | Status: SHIPPED | OUTPATIENT
Start: 2025-08-27

## 2025-08-27 RX ORDER — HYDROCHLOROTHIAZIDE 12.5 MG/1
1 CAPSULE ORAL
Qty: 6 EACH | Refills: 3 | Status: SHIPPED | OUTPATIENT
Start: 2025-08-27

## 2025-08-27 ASSESSMENT — ENCOUNTER SYMPTOMS
NAUSEA: 0
WEIGHT LOSS: 0
ABDOMINAL PAIN: 0
CHILLS: 0
HEARTBURN: 0
HEADACHES: 0
DIARRHEA: 0
FEVER: 0
PALPITATIONS: 0
SHORTNESS OF BREATH: 0
DIZZINESS: 0
CONSTIPATION: 0
VOMITING: 0

## (undated) DEVICE — TUBING CLEARLINK DUO-VENT - C-FLO (48EA/CA)

## (undated) DEVICE — PACK C-SECTION (2EA/CA)

## (undated) DEVICE — GLOVE BIOGEL INDICATOR SZ 6.5 SURGICAL PF LTX - (50PR/BX 4BX/CA)

## (undated) DEVICE — DRESSING INTERCEED ABSORBABLE ADHESION BARRIER TC7 (10EA/CA)

## (undated) DEVICE — KIT  I.V. START (100EA/CA)

## (undated) DEVICE — TRAY SPINAL ANESTHESIA NON-SAFETY (10/CA)

## (undated) DEVICE — CANISTER SUCTION 3000ML MECHANICAL FILTER AUTO SHUTOFF MEDI-VAC NONSTERILE LF DISP  (40EA/CA)

## (undated) DEVICE — GLOVE, BIOGEL ECLIPSE, SZ 7.0, PF LTX (50/BX)

## (undated) DEVICE — DRESSING NON-ADHERING 8 X 3 - (50/BX)

## (undated) DEVICE — CATHETER IV NON-SAFETY 18 GA X 1 1/4 (50/BX 4BX/CA)

## (undated) DEVICE — TAPE CLOTH MEDIPORE 6 INCH - (12RL/CA)

## (undated) DEVICE — LIGASURE SM JAW SEALER CRVD - (6EA/CA)

## (undated) DEVICE — 0 PDS II CT-1 DUP

## (undated) DEVICE — SET EXTENSION WITH 2 PORTS (48EA/CA) ***PART #2C8610 IS A SUBSTITUTE*****

## (undated) DEVICE — DETERGENT RENUZYME PLUS 10 OZ PACKET (50/BX)

## (undated) DEVICE — HEAD HOLDER JUNIOR/ADULT

## (undated) DEVICE — SUTURE 2-0 MONOCRYL CT-1

## (undated) DEVICE — DRESSING POST OP BORDER 4 X 10 (5EA/BX)

## (undated) DEVICE — SUTURE 0 VICRYL PLUS CT-1 - 36 INCH (36/BX)

## (undated) DEVICE — SOLUTION PLASMA-LYTE PH 7.4 INJ 1000ML  (14EA/CA)

## (undated) DEVICE — ELECTRODE DUAL RETURN W/ CORD - (50/PK)

## (undated) DEVICE — PACK ROOM TURNOVER L&D (12/CA)

## (undated) DEVICE — RETRACTOR O C SECTION LRY - (5/BX)

## (undated) DEVICE — SLEEVE, SEQUENTIAL CALF REG

## (undated) DEVICE — CHLORAPREP 26 ML APPLICATOR - ORANGE TINT(25/CA)

## (undated) DEVICE — WATER IRRIG. STER. 1500 ML - (9/CA)

## (undated) DEVICE — 0 CHROMIC CT-1

## (undated) DEVICE — GLOVE BIOGEL SZ 6 PF LATEX - (50EA/BX 4BX/CA)

## (undated) DEVICE — DRESSING, 4X4 VIGILON STERILE

## (undated) DEVICE — SODIUM CHL IRRIGATION 0.9% 1000ML (12EA/CA)

## (undated) DEVICE — PAD LAP STERILE 18 X 18 - (5/PK 40PK/CA)

## (undated) DEVICE — SUTURE 3-0 MONOCRYL PLUS PS-1 - 27 INCH (36/BX)

## (undated) DEVICE — TRAY BLADDER CARE W/ 16 FR FOLEY CATHETER STATLOCK  (10/CA)

## (undated) DEVICE — STAPLER SKIN DISP - (6/BX 10BX/CA) VISISTAT

## (undated) DEVICE — SHEATH RO 4F 10CM (10EA/BX)